# Patient Record
Sex: FEMALE | Race: WHITE | NOT HISPANIC OR LATINO | Employment: OTHER | URBAN - METROPOLITAN AREA
[De-identification: names, ages, dates, MRNs, and addresses within clinical notes are randomized per-mention and may not be internally consistent; named-entity substitution may affect disease eponyms.]

---

## 2023-06-20 PROBLEM — J44.1 COPD WITH ACUTE EXACERBATION: Status: ACTIVE | Noted: 2023-06-20

## 2023-06-20 PROBLEM — J96.01 ACUTE RESPIRATORY FAILURE WITH HYPOXIA: Status: ACTIVE | Noted: 2023-06-20

## 2023-06-20 PROBLEM — J44.1 COPD EXACERBATION: Status: ACTIVE | Noted: 2023-06-20

## 2023-06-20 PROBLEM — Z72.0 TOBACCO USE: Status: ACTIVE | Noted: 2023-06-20

## 2023-06-21 PROBLEM — E43 SEVERE MALNUTRITION: Status: ACTIVE | Noted: 2023-06-21

## 2023-06-23 PROBLEM — J96.01 ACUTE RESPIRATORY FAILURE WITH HYPOXIA: Status: RESOLVED | Noted: 2023-06-20 | Resolved: 2023-06-23

## 2023-07-31 ENCOUNTER — HOSPITAL ENCOUNTER (EMERGENCY)
Facility: HOSPITAL | Age: 73
Discharge: HOME | End: 2023-07-31
Attending: EMERGENCY MEDICINE | Admitting: EMERGENCY MEDICINE
Payer: MEDICARE

## 2023-07-31 VITALS
RESPIRATION RATE: 18 BRPM | SYSTOLIC BLOOD PRESSURE: 126 MMHG | BODY MASS INDEX: 21.34 KG/M2 | HEART RATE: 84 BPM | TEMPERATURE: 98 F | DIASTOLIC BLOOD PRESSURE: 110 MMHG | HEIGHT: 64 IN | WEIGHT: 125 LBS | OXYGEN SATURATION: 97 %

## 2023-07-31 DIAGNOSIS — Z87.09 HISTORY OF COPD: ICD-10-CM

## 2023-07-31 DIAGNOSIS — Z99.81 DEPENDENCE ON SUPPLEMENTAL OXYGEN: Primary | ICD-10-CM

## 2023-07-31 PROCEDURE — 99283 EMERGENCY DEPT VISIT LOW MDM: CPT

## 2023-08-10 ENCOUNTER — OFFICE VISIT (OUTPATIENT)
Dept: PULMONOLOGY | Facility: CLINIC | Age: 73
End: 2023-08-10
Payer: MEDICARE

## 2023-08-10 VITALS
DIASTOLIC BLOOD PRESSURE: 52 MMHG | WEIGHT: 107 LBS | HEIGHT: 66 IN | TEMPERATURE: 98.2 F | SYSTOLIC BLOOD PRESSURE: 104 MMHG | BODY MASS INDEX: 17.19 KG/M2 | OXYGEN SATURATION: 97 % | HEART RATE: 68 BPM

## 2023-08-10 DIAGNOSIS — J44.9 COPD MIXED TYPE: ICD-10-CM

## 2023-08-10 DIAGNOSIS — J96.11 CHRONIC RESPIRATORY FAILURE WITH HYPOXIA: ICD-10-CM

## 2023-08-10 DIAGNOSIS — E43 SEVERE MALNUTRITION: ICD-10-CM

## 2023-08-10 DIAGNOSIS — Z72.0 TOBACCO USE: ICD-10-CM

## 2023-08-10 DIAGNOSIS — R91.1 PULMONARY NODULE: Primary | ICD-10-CM

## 2023-08-10 RX ORDER — FLUTICASONE FUROATE, UMECLIDINIUM BROMIDE AND VILANTEROL TRIFENATATE 100; 62.5; 25 UG/1; UG/1; UG/1
POWDER RESPIRATORY (INHALATION)
COMMUNITY
Start: 2023-07-29 | End: 2023-08-10

## 2023-08-10 RX ORDER — BUDESONIDE AND FORMOTEROL FUMARATE DIHYDRATE 160; 4.5 UG/1; UG/1
2 AEROSOL RESPIRATORY (INHALATION)
Qty: 1 EACH | Refills: 11 | Status: SHIPPED | OUTPATIENT
Start: 2023-08-10

## 2023-08-10 RX ORDER — ALBUTEROL SULFATE 2.5 MG/3ML
2.5 SOLUTION RESPIRATORY (INHALATION) EVERY 6 HOURS PRN
Qty: 120 EACH | Refills: 11 | Status: SHIPPED | OUTPATIENT
Start: 2023-08-10

## 2023-08-11 ENCOUNTER — TELEPHONE (OUTPATIENT)
Dept: PULMONOLOGY | Facility: CLINIC | Age: 73
End: 2023-08-11
Payer: MEDICARE

## 2023-08-11 NOTE — PROGRESS NOTES
PULMONARY  NOTE    Chief Complaint     Pulmonary nodule, COPD, respiratory failure, tobacco abuse    History of Present Illness     72-year-old female referred for an abnormal CT scan of the chest    She had an exacerbation of COPD resulting in a hospitalization at [facility] in June  A CT scan of the chest done at the time of her admission revealed a pulmonary nodule    The patient has a history of tobacco abuse but indicates that she has not smoked since her hospitalization in June    She has chronic hypoxic respiratory failure  She has supplemental oxygen which she uses 24 hours a day    She has been on Symbicort, Spiriva, and albuterol as needed since her discharge    To her knowledge she has not had a CT scan of the chest in the past    She has dyspnea on exertion would have difficulty going up 1 flight of stairs  She has chronic daily cough but no hemoptysis    Patient Active Problem List   Diagnosis    Tobacco use    Severe malnutrition    COPD (? Severity)    Pulmonary nodule (7mm LLL incidental)    Chronic respiratory failure with hypoxia     No Known Allergies    Current Outpatient Medications:     budesonide-formoterol (SYMBICORT) 160-4.5 MCG/ACT inhaler, Inhale 2 puffs 2 (Two) Times a Day., Disp: 10.2 g, Rfl: 2    ipratropium-albuterol (DUO-NEB) 0.5-2.5 mg/3 ml nebulizer, Take 3 mL by nebulization Every 6 (Six) Hours As Needed for Wheezing or Shortness of Air., Disp: 360 mL, Rfl: 0    nicotine (NICODERM CQ) 14 MG/24HR patch, Place 1 patch on the skin as directed by provider Daily., Disp: 30 patch, Rfl: 0    predniSONE (DELTASONE) 10 MG tablet, 40mg x 3 days, 30mg x 3 days, 20mg x 3 days, 10mg x 3 days then stop, Disp: 30 tablet, Rfl: 0    tiotropium bromide monohydrate (SPIRIVA RESPIMAT) 2.5 MCG/ACT aerosol solution inhaler, Inhale 2 puffs Daily., Disp: 4 g, Rfl: 0    Trelegy Ellipta 100-62.5-25 MCG/ACT inhaler, INHALE 1 PUFF BY MOUTH ONCE DAILY FOR 30 DAYS, Disp: , Rfl:   MEDICATION LIST AND ALLERGIES  "REVIEWED.    Family History   Problem Relation Age of Onset    Stroke Mother     Stroke Father      Social History     Tobacco Use    Smoking status: Every Day     Packs/day: 0.50     Types: Cigarettes     Passive exposure: Current    Smokeless tobacco: Never   Vaping Use    Vaping Use: Never used   Substance Use Topics    Alcohol use: Never    Drug use: Never     Social History     Social History Narrative    Hasn't smoked since her hospitalization 6/2023     FAMILY AND SOCIAL HISTORY REVIEWED.    Review of Systems  IF PRESENT REFER TO SCANNED ROS SHEET FROM SAME DATE  OTHERWISE ROS OBTAINED AND NON-CONTRIBUTORY OVER HPI.    /52 (BP Location: Right arm, Patient Position: Sitting, Cuff Size: Adult)   Pulse 68   Temp 98.2 øF (36.8 øC)   Ht 167.6 cm (66\")   Wt 48.5 kg (107 lb)   SpO2 97%   PF (!) 2 L/min Comment: Continuous  BMI 17.27 kg/mý   Physical Exam  Vitals and nursing note reviewed.   Constitutional:       General: She is not in acute distress.     Appearance: She is well-developed. She is not diaphoretic.   HENT:      Head: Normocephalic and atraumatic.   Neck:      Thyroid: No thyromegaly.   Cardiovascular:      Rate and Rhythm: Normal rate and regular rhythm.      Heart sounds: Normal heart sounds. No murmur heard.  Pulmonary:      Effort: Pulmonary effort is normal.      Breath sounds: No stridor.      Comments: Decreased breath sounds bilaterally without wheezes  Lymphadenopathy:      Cervical: No cervical adenopathy.      Upper Body:      Right upper body: No supraclavicular or epitrochlear adenopathy.      Left upper body: No supraclavicular or epitrochlear adenopathy.   Skin:     General: Skin is warm and dry.   Neurological:      Mental Status: She is alert and oriented to person, place, and time.   Psychiatric:         Behavior: Behavior normal.       Results     PFTs are difficult to interpret.  Very severe airway obstruction cannot be excluded.  No restriction and unable to determine " DLCO    CT scan of the chest from 6/2023 reviewed on PACS  Incidental noncalcified 7 mm left lower lobe pulmonary nodule    Immunization History   Administered Date(s) Administered    Pneumococcal Conjugate 13-Valent (PCV13) 07/31/2019     Problem List       ICD-10-CM ICD-9-CM   1. Pulmonary nodule (7mm LLL incidental)  R91.1 793.11   2. COPD (? Severity)  J44.9 496   3. Chronic respiratory failure with hypoxia  J96.11 518.83     799.02   4. Severe malnutrition  E43 261   5. Tobacco use  Z72.0 305.1       Discussion     We discussed her chest imaging  She has an incidentally identified 7 mm left lower lobe pulmonary nodule  She is high risk given her history of tobacco abuse  I gave her literature on pulmonary nodules and management guidelines  Based on Fleischner Society guidelines I recommend a 6-month follow-up CT scan of the chest which we will arrange before the end of the year    I encouraged her smoking cessation efforts    She is can remain on Symbicort, Spiriva, and albuterol as needed    I have encouraged continued use of supplemental oxygen    I will plan to see her back after her follow-up CT scan of the chest    Moderate level of Medical Decision Making complexity based on 1 undiagnosed new problem or 2 stable chronic conditions, independent interpretation of tests, and/or prescription drug management     Nik Carr MD  Note electronically signed    CC: Provider, No Known

## 2023-08-14 DIAGNOSIS — R91.1 PULMONARY NODULE: Primary | ICD-10-CM

## 2023-08-14 DIAGNOSIS — Z72.0 TOBACCO USE: ICD-10-CM

## 2023-08-15 NOTE — TELEPHONE ENCOUNTER
Spiriva respimat 2.5 mcg is not covered by insurance, other covered medication are atrovent hfa anoro ellipta breo ellipta 100-200 mcg or incruse ellipta 62.5mcg

## 2023-08-16 RX ORDER — UMECLIDINIUM BROMIDE AND VILANTEROL TRIFENATATE 62.5; 25 UG/1; UG/1
1 POWDER RESPIRATORY (INHALATION)
Qty: 14 EACH | Refills: 4 | Status: SHIPPED | OUTPATIENT
Start: 2023-08-16

## 2023-09-12 ENCOUNTER — OFFICE VISIT (OUTPATIENT)
Dept: FAMILY MEDICINE CLINIC | Facility: CLINIC | Age: 73
End: 2023-09-12
Payer: MEDICARE

## 2023-09-12 VITALS
WEIGHT: 108 LBS | HEART RATE: 83 BPM | TEMPERATURE: 98.2 F | OXYGEN SATURATION: 80 % | RESPIRATION RATE: 18 BRPM | DIASTOLIC BLOOD PRESSURE: 58 MMHG | SYSTOLIC BLOOD PRESSURE: 110 MMHG | HEIGHT: 66 IN | BODY MASS INDEX: 17.36 KG/M2

## 2023-09-12 DIAGNOSIS — Z99.81 SUPPLEMENTAL OXYGEN DEPENDENT: ICD-10-CM

## 2023-09-12 DIAGNOSIS — R91.1 PULMONARY NODULE: Primary | ICD-10-CM

## 2023-09-12 DIAGNOSIS — J44.9 COPD MIXED TYPE: ICD-10-CM

## 2023-09-12 DIAGNOSIS — J96.11 CHRONIC RESPIRATORY FAILURE WITH HYPOXIA: ICD-10-CM

## 2023-09-12 DIAGNOSIS — Z72.0 TOBACCO USE: ICD-10-CM

## 2023-09-12 DIAGNOSIS — Z23 NEED FOR VACCINATION FOR STREP PNEUMONIAE: ICD-10-CM

## 2023-09-12 NOTE — LETTER
[facility]  Vaccine Consent Form    Patient Name:  Elisabeth Burger  Patient :  1950     Vaccine(s) Ordered    Pneumococcal Conjugate Vaccine 20-Valent (PCV20)        Screening Checklist  The following questions should be completed prior to vaccination. If you answer “yes” to any question, it does not necessarily mean you should not be vaccinated. It just means we may need to clarify or ask more questions. If a question is unclear, please ask your healthcare provider to explain it.    Yes No   Any fever or moderate to severe illness today (mild illness and/or antibiotic treatment are not contraindications)?     Do you have a history of a serious reaction to any previous vaccinations, such as anaphylaxis, encephalopathy within 7 days, Guillain-Greenwald syndrome within 6 weeks, seizure?     Have you received any live vaccine(s) in the past month (MMR, MINERVA)?     Do you have an anaphylactic allergy to latex (DTaP, DTaP-IPV, Hep A, Hep B, MenB, RV, Td, Tdap), baker’s yeast (Hep B, HPV), or gelatin (MINERVA, MMR)?     Do you have an anaphylactic allergy to neomycin (Rabies, MINERVA, MMR, IPV, Hep A), polymyxin B (IPV), or streptomycin (IPV)?      Any cancer, leukemia, AIDS, or other immune system disorder? (MINERVA, MMR, RV)     Do you have a parent, brother, or sister with an immune system problem (if immune competence of vaccine recipient clinically verified, can proceed)? (MMR, MINERVA)     Any recent steroid treatments for >2 weeks, chemotherapy, or radiation treatment? (MINERVA, MMR)     Have you received antibody-containing blood transfusions or IVIG in the past 11 months (recommended interval is dependent on product)? (MMR, MINERVA)     Have you taken antiviral drugs (acyclovir, famciclovir, valacyclovir) in the last 24 or 48 hours, respectively (MINERVA)?      Are you pregnant or planning to become pregnant within 1 month? (MINERVA, MMR, HPV, IPV, MenB; For hep B- refer to Engerix-B)     For infants, have you ever been told your child  has had intussusception or a medical emergency involving obstruction of the intestine (RV)? If not for an infant, can skip this question.         *Ordering Physician/APC should be consulted if “yes” is checked by the patient or guardian above.      I have received, read, and understand the Vaccine Information Statement (VIS) for each vaccine ordered above.  I have considered my health status as well as the health status of my close contacts.  I have taken the opportunity to discuss my vaccine questions with my health care provider.   I have requested that the ordered vaccine(s) be given to me.  I understand the benefits and risks of the vaccines.  I understand that I should remain in the clinic for 15 minutes after receiving the vaccine(s).  _________________________________________________________  Signature of Patient or Parent/Legal Guardian ____________________  Date

## 2023-09-12 NOTE — PROGRESS NOTES
"Chief Complaint   Patient presents with    [facility]     Had pneumonia 1 month ago went to [facility], Need for PCP  Today 80 O2 without oxygen   Needs to get back on patches for smoking.       Subjective      Elisabeth Burger is a 73 y.o. who presents to [facility].  Lives with daughter and grandson.  Previous PCP was Dr. Rodriguez who retired.      Pulmonology - treating with Dr. Carr at [facility]. Taking symbicort and spiriva inhalers, but admits not as compliant as she should be.  She is oxygen dependent, and does not wear her oxygen at home as much as she should.  States she is using albuterol nebulizer daily.  Follows up with pulmonology in 6 months, but this has not been scheduled.  Patient's niece was with her at today's visit, and called the office and scheduled for December 2023.  Recent hospitalization a little over a month ago for pneumonia.  At [facility].  No notes (requested).  She has cut back on smoking now 1/2 pack per day from 2 packs per day.  She is using nicotine patches as needed.  Sees GYN tomorrow for pessary change  No other significant medical history reported.      The following portions of the patient's history were reviewed and updated as appropriateallergies, current medications, past family history, past medical history, past social history, past surgical history, and problem list.    Review of Systems   Constitutional:  Negative for chills and fever.   Respiratory:  Positive for shortness of breath and wheezing. Negative for cough and chest tightness.    Cardiovascular:  Negative for chest pain, palpitations and leg swelling.   Neurological:  Negative for dizziness.   Psychiatric/Behavioral: Negative.       Objective   Vital Signs:  /58   Pulse 83   Temp 98.2 °F (36.8 °C)   Resp 18   Ht 167.6 cm (66\")   Wt 49 kg (108 lb)   SpO2 (!) 80%   BMI 17.43 kg/m²             Physical Exam  Vitals and nursing note reviewed.   Constitutional:       General: She is " not in acute distress.     Appearance: Normal appearance.   Cardiovascular:      Rate and Rhythm: Normal rate and regular rhythm.      Heart sounds: Normal heart sounds.   Pulmonary:      Effort: Pulmonary effort is normal.      Breath sounds: Decreased breath sounds (throughout) present. No wheezing, rhonchi or rales.   Neurological:      General: No focal deficit present.      Mental Status: She is alert and oriented to person, place, and time.   Psychiatric:         Mood and Affect: Mood normal.         Behavior: Behavior normal.        Result Review                     Assessment and Plan  Diagnoses and all orders for this visit:    1. Pulmonary nodule (7mm LLL incidental) (Primary)    2. COPD (? Severity)  -     Cancel: Fluzone >6 Months (5962-1952)    3. Chronic respiratory failure with hypoxia    4. Supplemental oxygen dependent    5. Tobacco use    6. Need for vaccination for Strep pneumoniae  -     Cancel: Fluzone >6 Months (8640-7000)  -     Pneumococcal Conjugate Vaccine 20-Valent (PCV20)      Pneumovax today.  Follow up in 4 weeks for annual wellness visit. Plan for flu vaccine at that point (high dose not available today in clinic).   Patient was instructed to wear O2 at home as previously directed by pulmonology. Patient to continue spiriva and symbicort inhalers as prescribed by pulmonology.  Patient can use albuterol inhaler or neb as needed. Instructed patient, daughter and niece related to medications  Patient was counseled on smoking cessation. She has cut back from 4 ppd at one point down to 1/2 ppd now.  She is continuing to try to stop smoking.  We will recheck in 1 month and discuss further   Recheck 02 was 94% with O2 NC.   Patient aware to follow up sooner for any increased sputum production, cough or increased shortness of breath.  Follow up with pulmonology as planned.         Discussed medications prescribed and OTC medications recommended.  Discussed medication safety, possible side  effects and how to take or administer medications. Instructed patient to report any adverse reactions, side effects or concerns.     Reviewed physical exam findings and plan with patient who verbalized understanding and agrees with plan of care. Patient was given opportunity to ask questions and all concerns were addressed prior to the conclusion of today's visit.     Follow Up  Return in about 4 weeks (around 10/10/2023) for Medicare Wellness, Annual and recheck COPD .  Patient was given instructions and counseling regarding her condition or for health maintenance advice. Please see specific information pulled into the AVS if appropriate.     Note to patient: The 21st Century Cures Act makes medical notes like these available to patients in the interest of transparency. However, be advised this is a medical document. It is intended as peer to peer communication. It is written in medical language and may contain abbreviations or verbiage that are unfamiliar. It may appear blunt or direct. Medical documents are intended to carry relevant information, facts as evident, and the clinical opinion of the provider.

## 2023-10-24 ENCOUNTER — FLU SHOT (OUTPATIENT)
Dept: FAMILY MEDICINE CLINIC | Facility: CLINIC | Age: 73
End: 2023-10-24
Payer: MEDICARE

## 2023-10-24 DIAGNOSIS — Z23 NEED FOR INFLUENZA VACCINATION: Primary | ICD-10-CM

## 2023-10-24 PROCEDURE — 90662 IIV NO PRSV INCREASED AG IM: CPT

## 2023-10-24 PROCEDURE — G0008 ADMIN INFLUENZA VIRUS VAC: HCPCS

## 2023-11-03 ENCOUNTER — OFFICE VISIT (OUTPATIENT)
Dept: FAMILY MEDICINE CLINIC | Facility: CLINIC | Age: 73
End: 2023-11-03
Payer: MEDICARE

## 2023-11-03 VITALS
HEIGHT: 66 IN | SYSTOLIC BLOOD PRESSURE: 122 MMHG | TEMPERATURE: 99.1 F | DIASTOLIC BLOOD PRESSURE: 60 MMHG | HEART RATE: 83 BPM | BODY MASS INDEX: 16.71 KG/M2 | WEIGHT: 104 LBS | RESPIRATION RATE: 18 BRPM | OXYGEN SATURATION: 84 %

## 2023-11-03 DIAGNOSIS — Z72.0 TOBACCO USE: ICD-10-CM

## 2023-11-03 DIAGNOSIS — Z13.220 LIPID SCREENING: ICD-10-CM

## 2023-11-03 DIAGNOSIS — Z99.81 SUPPLEMENTAL OXYGEN DEPENDENT: ICD-10-CM

## 2023-11-03 DIAGNOSIS — Z23 NEED FOR VACCINATION: ICD-10-CM

## 2023-11-03 DIAGNOSIS — Z00.00 ENCOUNTER FOR ANNUAL WELLNESS VISIT (AWV) IN MEDICARE PATIENT: Primary | ICD-10-CM

## 2023-11-03 DIAGNOSIS — J44.9 COPD MIXED TYPE: ICD-10-CM

## 2023-11-03 DIAGNOSIS — E43 SEVERE MALNUTRITION: ICD-10-CM

## 2023-11-03 NOTE — PROGRESS NOTES
The ABCs of the Annual Wellness Visit  Subsequent Medicare Wellness Visit    Subjective    Elisabeth Burger is a 73 y.o. female who presents for a Subsequent Medicare Wellness Visit.      The following portions of the patient's history were reviewed and   updated as appropriate: allergies, current medications, past family history, past medical history, past social history, past surgical history, and problem list.    Compared to one year ago, the patient feels her physical   health is better.    Compared to one year ago, the patient feels her mental   health is better.    Recent Hospitalizations:  This patient has had a [facility] admission record on file within the last 365 days.    Current Medical Providers:  Patient Care Team:  Corina Portillo APRN as PCP - General (Family Medicine)  Nikki Niño RN as Nurse Navigator  Nik Carr MD as Emergency Attending (Pulmonary Disease)    Outpatient Medications Prior to Visit   Medication Sig Dispense Refill    albuterol (PROVENTIL) (2.5 MG/3ML) 0.083% nebulizer solution Take 2.5 mg by nebulization Every 6 (Six) Hours As Needed for Wheezing or Shortness of Air. 120 each 11    budesonide-formoterol (SYMBICORT) 160-4.5 MCG/ACT inhaler Inhale 2 puffs 2 (Two) Times a Day. 1 each 11    nicotine (NICODERM CQ) 14 MG/24HR patch Place 1 patch on the skin as directed by provider Daily. 30 patch 0    tiotropium bromide monohydrate (SPIRIVA RESPIMAT) 2.5 MCG/ACT aerosol solution inhaler Inhale 2 puffs Daily. 1 each 11    Umeclidinium-Vilanterol (Anoro Ellipta) 62.5-25 MCG/ACT aerosol powder  inhaler Inhale 1 puff Daily. 14 each 4     No facility-administered medications prior to visit.       No opioid medication identified on active medication list. I have reviewed chart for other potential  high risk medication/s and harmful drug interactions in the elderly.        Aspirin is not on active medication list.  Aspirin use is not indicated based on review of current  "medical condition/s. Risk of harm outweighs potential benefits.  .    Patient Active Problem List   Diagnosis    Tobacco use    Severe malnutrition    COPD (? Severity)    Pulmonary nodule (7mm LLL incidental)    Chronic respiratory failure with hypoxia    Supplemental oxygen dependent     Advance Care Planning   Advance Care Planning     Advance Directive is not on file.  ACP discussion was held with the patient during this visit. Patient has an advance directive (not in EMR), copy requested.     Objective    Vitals:    23 1142   BP: 122/60   Pulse: 83   Resp: 18   Temp: 99.1 °F (37.3 °C)   SpO2: (!) 84%   Weight: 47.2 kg (104 lb)   Height: 167.6 cm (66\")     Estimated body mass index is 16.79 kg/m² as calculated from the following:    Height as of this encounter: 167.6 cm (66\").    Weight as of this encounter: 47.2 kg (104 lb).    Does the patient have evidence of cognitive impairment? No          HEALTH RISK ASSESSMENT    Smoking Status:  Social History     Tobacco Use   Smoking Status Former    Packs/day: 0.50    Years: 50.00    Additional pack years: 0.00    Total pack years: 25.00    Types: Cigarettes    Quit date:     Years since quittin.8    Passive exposure: Current   Smokeless Tobacco Never     Alcohol Consumption:  Social History     Substance and Sexual Activity   Alcohol Use Never     Fall Risk Screen:    LEIGHANN Fall Risk Assessment was completed, and patient is at LOW risk for falls.Assessment completed on:11/3/2023    Depression Screenin/3/2023    11:50 AM   PHQ-2/PHQ-9 Depression Screening   Feeling Down, Depressed or Hopeless 0-->not at all   PHQ-9: Brief Depression Severity Measure Score 0       Health Habits and Functional and Cognitive Screenin/3/2023    11:48 AM   Functional & Cognitive Status   Do you have difficulty preparing food and eating? No   Do you have difficulty bathing yourself, getting dressed or grooming yourself? No   Do you have difficulty using " the toilet? No   Do you have difficulty moving around from place to place? No   Do you have trouble with steps or getting out of a bed or a chair? No   Current Diet Well Balanced Diet   Dental Exam Up to date   Eye Exam Not up to date   Exercise (times per week) 0 times per week   Current Exercises Include Walking   Do you need help using the phone?  No   Are you deaf or do you have serious difficulty hearing?  No   Do you need help to go to places out of walking distance? No   Do you need help shopping? No   Do you need help preparing meals?  No   Do you need help with housework?  No   Do you need help with laundry? No   Do you need help taking your medications? No   Do you need help managing money? No   Do you ever drive or ride in a car without wearing a seat belt? No   Have you felt unusual stress, anger or loneliness in the last month? No   Who do you live with? Child   If you need help, do you have trouble finding someone available to you? No   Have you been bothered in the last four weeks by sexual problems? No   Do you have difficulty concentrating, remembering or making decisions? No       Age-appropriate Screening Schedule:  Refer to the list below for future screening recommendations based on patient's age, sex and/or medical conditions. Orders for these recommended tests are listed in the plan section. The patient has been provided with a written plan.    Health Maintenance   Topic Date Due    DXA SCAN  Never done    COVID-19 Vaccine (1) Never done    ZOSTER VACCINE (1 of 2) Never done    HEPATITIS C SCREENING  Never done    ANNUAL WELLNESS VISIT  Never done    LUNG CANCER SCREENING  06/20/2024    COLORECTAL CANCER SCREENING  06/20/2024    BMI FOLLOWUP  11/03/2024    TDAP/TD VACCINES (2 - Td or Tdap) 11/03/2033    INFLUENZA VACCINE  Completed    Pneumococcal Vaccine 65+  Completed                  CMS Preventative Services Quick Reference  Risk Factors Identified During Encounter  Immunizations  "Discussed/Encouraged: Tdap and Shingrix  Vision Screening Recommended  The above risks/problems have been discussed with the patient.  Pertinent information has been shared with the patient in the After Visit Summary.  An After Visit Summary and PPPS were made available to the patient.    Follow Up:   Next Medicare Wellness visit to be scheduled in 1 year.       Additional E&M Note during same encounter follows:  Patient has multiple medical problems which are significant and separately identifiable that require additional work above and beyond the Medicare Wellness Visit.      Chief Complaint  Medicare Wellness-subsequent    HPI  Elisabeth Burger is also being seen today for COPD follow up.  She is O2 Dependent but did not bring her supplemental oxygen today.  She does not feel short of breath.  She is not in respiratory distress.   Treating with pulmonology - sees them again in a month.    She has cut back on smoking and has not had a cigarette today, but still smokes 2-3 per day.      Review of Systems   Constitutional:  Negative for chills and fever.   HENT:  Positive for rhinorrhea. Negative for congestion and sore throat.    Eyes: Negative.    Respiratory:  Positive for cough. Negative for chest tightness and shortness of breath (does not feel short of breath).    Cardiovascular:  Negative for chest pain and leg swelling.   Gastrointestinal:  Negative for abdominal pain, constipation and diarrhea.   Endocrine: Negative for cold intolerance and heat intolerance.   Genitourinary:  Negative for dysuria and hematuria.   Neurological:  Negative for dizziness, syncope and numbness.   Psychiatric/Behavioral:  Negative for dysphoric mood. The patient is not nervous/anxious.        Objective   Vital Signs:  /60   Pulse 83   Temp 99.1 °F (37.3 °C)   Resp 18   Ht 167.6 cm (66\")   Wt 47.2 kg (104 lb)   SpO2 (!) 84%   BMI 16.79 kg/m²     Physical Exam  Vitals and nursing note reviewed.   HENT:      Head: " Normocephalic.      Right Ear: Tympanic membrane, ear canal and external ear normal.      Left Ear: Tympanic membrane, ear canal and external ear normal.      Nose: Nose normal.      Mouth/Throat:      Pharynx: Oropharynx is clear.   Eyes:      Extraocular Movements: Extraocular movements intact.      Pupils: Pupils are equal, round, and reactive to light.   Cardiovascular:      Rate and Rhythm: Normal rate and regular rhythm.      Heart sounds: Normal heart sounds.   Pulmonary:      Effort: Pulmonary effort is normal.      Breath sounds: Decreased breath sounds present.   Abdominal:      General: Bowel sounds are normal.      Palpations: Abdomen is soft.      Tenderness: There is no abdominal tenderness.   Musculoskeletal:         General: Normal range of motion.      Cervical back: Normal range of motion.   Skin:     General: Skin is warm and dry.      Capillary Refill: Capillary refill takes less than 2 seconds.   Neurological:      General: No focal deficit present.      Mental Status: She is alert and oriented to person, place, and time.   Psychiatric:         Mood and Affect: Mood normal.         Behavior: Behavior normal.                         Assessment and Plan   Diagnoses and all orders for this visit:    1. Encounter for annual wellness visit (AWV) in Medicare patient (Primary)  -     Comprehensive Metabolic Panel; Future  -     CBC (No Diff); Future    2. Supplemental oxygen dependent    3. COPD mixed type  -     Comprehensive Metabolic Panel; Future  -     CBC (No Diff); Future    4. Lipid screening  -     Lipid Panel; Future    5. Need for vaccination  -     Tdap Vaccine Greater Than or Equal To 6yo IM    6. Severe malnutrition  -     Ambulatory Referral to Nutrition Services    7. Tobacco use      Surveillance labs ordered.   Malnutrition - referral to nutrition services. Recommended small frequent meals and possible supplementation with protein shakes. She states she does not like protein shakes.    Discussed wellness with patient including preventative care (vaccines and recommended screenings). Discussed recommendations for diet and exercise.    Spent 3-4 minutes discussing the importance of always wearing supplemental oxygen.  We also discussed smoking cessation. Elisabeth Burger  reports that she quit smoking about 10 months ago. Her smoking use included cigarettes. She has a 25.00 pack-year smoking history. She has been exposed to tobacco smoke. She has never used smokeless tobacco.. I have educated her on the risk of diseases from using tobacco products such as cancer, COPD, and heart disease.     I advised her to quit and she is willing to quit. We have discussed the following method/s for tobacco cessation:  Counseling.  Together we have set a quit date for  undetermined .  She will follow up with me in 3 months or sooner to check on her progress.  Congratulated patient on her progress with cutting back on smoking.   I spent 4 minutes counseling the patient.              Follow Up   Return in about 3 months (around 2/3/2024) for Recheck COPD .  Patient was given instructions and counseling regarding her condition or for health maintenance advice. Please see specific information pulled into the AVS if appropriate.

## 2023-12-13 ENCOUNTER — OFFICE VISIT (OUTPATIENT)
Dept: PULMONOLOGY | Facility: CLINIC | Age: 73
End: 2023-12-13
Payer: MEDICARE

## 2023-12-13 VITALS
SYSTOLIC BLOOD PRESSURE: 126 MMHG | OXYGEN SATURATION: 91 % | BODY MASS INDEX: 17.13 KG/M2 | HEIGHT: 66 IN | WEIGHT: 106.6 LBS | HEART RATE: 64 BPM | RESPIRATION RATE: 18 BRPM | DIASTOLIC BLOOD PRESSURE: 70 MMHG

## 2023-12-13 DIAGNOSIS — E43 SEVERE MALNUTRITION: ICD-10-CM

## 2023-12-13 DIAGNOSIS — J96.11 CHRONIC RESPIRATORY FAILURE WITH HYPOXIA: Primary | ICD-10-CM

## 2023-12-13 DIAGNOSIS — R91.1 PULMONARY NODULE: ICD-10-CM

## 2023-12-13 DIAGNOSIS — J44.9 COPD MIXED TYPE: ICD-10-CM

## 2023-12-13 DIAGNOSIS — Z99.81 SUPPLEMENTAL OXYGEN DEPENDENT: ICD-10-CM

## 2023-12-13 RX ORDER — BUDESONIDE AND FORMOTEROL FUMARATE DIHYDRATE 160; 4.5 UG/1; UG/1
2 AEROSOL RESPIRATORY (INHALATION)
Qty: 1 EACH | Refills: 11 | Status: SHIPPED | OUTPATIENT
Start: 2023-12-13

## 2023-12-13 RX ORDER — ALBUTEROL SULFATE 2.5 MG/3ML
2.5 SOLUTION RESPIRATORY (INHALATION) EVERY 6 HOURS PRN
Qty: 120 EACH | Refills: 11 | Status: SHIPPED | OUTPATIENT
Start: 2023-12-13

## 2023-12-13 NOTE — PROGRESS NOTES
PULMONARY  NOTE    Chief Complaint     Pulmonary nodule, COPD, chronic respiratory failure, tobacco abuse    History of Present Illness     73-year-old female returns today for follow-up  I initially saw her 8/10/2023    She has a history of tobacco abuse  She stopped smoking when she was hospitalized in June but sounds as though she is gone back to smoking periodically    She has chronic hypoxic respiratory failure due to advanced age COPD  She has supplemental oxygen which she apparently does not consistently use on a regular basis    She has been on Symbicort, Spiriva, and albuterol for her COPD  She has had no recent acute exacerbation  Apparently insurance is covering the cost of the medications    She had a 7 mm left lower lobe incidentally identified pulmonary nodule on prior chest imaging  Her plan was for 6-month CT scan of the chest which apparently she did not get scheduled    Patient Active Problem List   Diagnosis    Tobacco use    Severe malnutrition    COPD (? Severity)    Pulmonary nodule (7mm LLL incidental)    Chronic respiratory failure with hypoxia    Supplemental oxygen dependent      No Known Allergies    Current Outpatient Medications:     albuterol (PROVENTIL) (2.5 MG/3ML) 0.083% nebulizer solution, Take 2.5 mg by nebulization Every 6 (Six) Hours As Needed for Wheezing or Shortness of Air., Disp: 120 each, Rfl: 11    budesonide-formoterol (SYMBICORT) 160-4.5 MCG/ACT inhaler, Inhale 2 puffs 2 (Two) Times a Day., Disp: 1 each, Rfl: 11    nicotine (NICODERM CQ) 14 MG/24HR patch, Place 1 patch on the skin as directed by provider Daily., Disp: 30 patch, Rfl: 0    tiotropium bromide monohydrate (SPIRIVA RESPIMAT) 2.5 MCG/ACT aerosol solution inhaler, Inhale 2 puffs Daily., Disp: 1 each, Rfl: 11    Umeclidinium-Vilanterol (Anoro Ellipta) 62.5-25 MCG/ACT aerosol powder  inhaler, Inhale 1 puff Daily., Disp: 14 each, Rfl: 4  MEDICATION LIST AND ALLERGIES REVIEWED.    Family History   Problem Relation  "Age of Onset    Stroke Mother     Stroke Father      Social History     Tobacco Use    Smoking status: Former     Packs/day: 0.50     Years: 50.00     Additional pack years: 0.00     Total pack years: 25.00     Types: Cigarettes     Quit date:      Years since quittin.9     Passive exposure: Current    Smokeless tobacco: Never   Vaping Use    Vaping Use: Never used   Substance Use Topics    Alcohol use: Never    Drug use: Never     Social History     Social History Narrative    Hasn't smoked since her hospitalization 2023     FAMILY AND SOCIAL HISTORY REVIEWED.    Review of Systems  IF PRESENT REFER TO SCANNED ROS SHEET FROM SAME DATE  OTHERWISE ROS OBTAINED AND NON-CONTRIBUTORY OVER HPI.    /70 (BP Location: Left arm)   Pulse 64   Resp 18   Ht 167.6 cm (66\")   Wt 48.4 kg (106 lb 9.6 oz)   SpO2 91%   BMI 17.21 kg/m²   Physical Exam  Vitals and nursing note reviewed.   Constitutional:       General: She is not in acute distress.     Appearance: She is well-developed. She is not diaphoretic.   HENT:      Head: Normocephalic and atraumatic.   Neck:      Thyroid: No thyromegaly.   Cardiovascular:      Rate and Rhythm: Normal rate and regular rhythm.      Heart sounds: Normal heart sounds. No murmur heard.  Pulmonary:      Effort: Pulmonary effort is normal.      Breath sounds: No stridor.      Comments: Decreased breath sounds bilaterally without wheezes  Abdominal:      Palpations: Abdomen is soft.   Lymphadenopathy:      Cervical: No cervical adenopathy.      Upper Body:      Right upper body: No supraclavicular or epitrochlear adenopathy.      Left upper body: No supraclavicular or epitrochlear adenopathy.   Skin:     General: Skin is warm and dry.   Neurological:      Mental Status: She is alert and oriented to person, place, and time.   Psychiatric:         Behavior: Behavior normal.         Results     Immunization History   Administered Date(s) Administered    Fluzone High-Dose 65+yrs " 10/24/2023    Pneumococcal Conjugate 13-Valent (PCV13) 07/31/2019    Pneumococcal Conjugate 20-Valent (PCV20) 09/12/2023    Tdap 11/03/2023     Problem List       ICD-10-CM ICD-9-CM   1. Chronic respiratory failure with hypoxia  J96.11 518.83     799.02   2. COPD (? Severity)  J44.9 496   3. Pulmonary nodule (7mm LLL incidental)  R91.1 793.11   4. Severe malnutrition  E43 261   5. Supplemental oxygen dependent  Z99.81 V46.2       Discussion     We will go ahead and reschedule her CT scan of the chest    We again discussed the need for total smoking abstinence    I encourage continued use of supplemental oxygen 24 hours a day    She will remain on Symbicort, Spiriva, and albuterol as needed  I will refill these medications    Assuming the CAT scan is stable/okay I will just see her back in 6 months or earlier if there are any problems    Moderate level of Medical Decision Making complexity based on 2 or more chronic stable illnesses and an independent review of test results and/or prescription drug management.    Nik Carr MD  Note electronically signed    CC: Corina Portillo APRN

## 2024-01-31 ENCOUNTER — PATIENT OUTREACH (OUTPATIENT)
Dept: ONCOLOGY | Facility: CLINIC | Age: 74
End: 2024-01-31
Payer: MEDICARE

## 2024-02-09 ENCOUNTER — PATIENT OUTREACH (OUTPATIENT)
Dept: ONCOLOGY | Facility: CLINIC | Age: 74
End: 2024-02-09
Payer: MEDICARE

## 2024-03-15 ENCOUNTER — HOSPITAL ENCOUNTER (OUTPATIENT)
Dept: CT IMAGING | Facility: HOSPITAL | Age: 74
Discharge: HOME | End: 2024-03-15
Payer: MEDICARE

## 2024-03-15 DIAGNOSIS — Z72.0 TOBACCO USE: ICD-10-CM

## 2024-03-15 DIAGNOSIS — R91.1 PULMONARY NODULE: ICD-10-CM

## 2024-03-15 PROCEDURE — 71250 CT THORAX DX C-: CPT

## 2024-03-18 ENCOUNTER — DOCUMENTATION (OUTPATIENT)
Dept: PULMONOLOGY | Facility: CLINIC | Age: 74
End: 2024-03-18
Payer: MEDICARE

## 2024-03-18 NOTE — PROGRESS NOTES
Patient underwent CT scan of the chest which revealed nothing suspicious    I communicated these results to the patient's caretaker over the phone and have encouraged them to keep a follow-up appointment later on this spring

## 2025-07-17 ENCOUNTER — HOSPITAL ENCOUNTER (INPATIENT)
Facility: HOSPITAL | Age: 75
LOS: 5 days | Discharge: HOME | End: 2025-07-22
Attending: EMERGENCY MEDICINE | Admitting: STUDENT IN AN ORGANIZED HEALTH CARE EDUCATION/TRAINING PROGRAM
Payer: MEDICARE

## 2025-07-17 ENCOUNTER — APPOINTMENT (OUTPATIENT)
Dept: CT IMAGING | Facility: HOSPITAL | Age: 75
End: 2025-07-17
Payer: MEDICARE

## 2025-07-17 ENCOUNTER — APPOINTMENT (OUTPATIENT)
Dept: GENERAL RADIOLOGY | Facility: HOSPITAL | Age: 75
End: 2025-07-17
Payer: MEDICARE

## 2025-07-17 DIAGNOSIS — E43 SEVERE MALNUTRITION: ICD-10-CM

## 2025-07-17 DIAGNOSIS — I50.9 ACUTE ON CHRONIC CONGESTIVE HEART FAILURE, UNSPECIFIED HEART FAILURE TYPE: Primary | ICD-10-CM

## 2025-07-17 DIAGNOSIS — E87.6 ACUTE HYPOKALEMIA: ICD-10-CM

## 2025-07-17 DIAGNOSIS — Z99.81 SUPPLEMENTAL OXYGEN DEPENDENT: ICD-10-CM

## 2025-07-17 DIAGNOSIS — K62.5 RECTAL BLEEDING: ICD-10-CM

## 2025-07-17 DIAGNOSIS — R60.0 PERIPHERAL EDEMA: ICD-10-CM

## 2025-07-17 DIAGNOSIS — R91.1 LUNG NODULE: ICD-10-CM

## 2025-07-17 DIAGNOSIS — J96.11 CHRONIC RESPIRATORY FAILURE WITH HYPOXIA: ICD-10-CM

## 2025-07-17 DIAGNOSIS — Z72.0 TOBACCO USE: ICD-10-CM

## 2025-07-17 DIAGNOSIS — R91.1 PULMONARY NODULE: ICD-10-CM

## 2025-07-17 DIAGNOSIS — R13.12 OROPHARYNGEAL DYSPHAGIA: ICD-10-CM

## 2025-07-17 DIAGNOSIS — J44.9 COPD MIXED TYPE: ICD-10-CM

## 2025-07-17 DIAGNOSIS — K62.5 ANAL BLEEDING: ICD-10-CM

## 2025-07-17 DIAGNOSIS — J43.9 PULMONARY EMPHYSEMA, UNSPECIFIED EMPHYSEMA TYPE: ICD-10-CM

## 2025-07-17 DIAGNOSIS — J44.1 COPD WITH ACUTE EXACERBATION: ICD-10-CM

## 2025-07-17 DIAGNOSIS — R09.89 PULMONARY CONGESTION: ICD-10-CM

## 2025-07-17 DIAGNOSIS — R09.02 HYPOXIA: ICD-10-CM

## 2025-07-17 LAB
ALBUMIN SERPL-MCNC: 3.5 G/DL (ref 3.5–5.2)
ALBUMIN/GLOB SERPL: 1.5 G/DL
ALP SERPL-CCNC: 96 U/L (ref 39–117)
ALT SERPL W P-5'-P-CCNC: 33 U/L (ref 1–33)
ANION GAP SERPL CALCULATED.3IONS-SCNC: 7.7 MMOL/L (ref 5–15)
AST SERPL-CCNC: 40 U/L (ref 1–32)
BACTERIA UR QL AUTO: NORMAL /HPF
BASOPHILS # BLD AUTO: 0.07 10*3/MM3 (ref 0–0.2)
BASOPHILS NFR BLD AUTO: 0.7 % (ref 0–1.5)
BILIRUB SERPL-MCNC: 0.2 MG/DL (ref 0–1.2)
BILIRUB UR QL STRIP: NEGATIVE
BUN SERPL-MCNC: 6.2 MG/DL (ref 8–23)
BUN/CREAT SERPL: 9.4 (ref 7–25)
CALCIUM SPEC-SCNC: 8.6 MG/DL (ref 8.6–10.5)
CHLORIDE SERPL-SCNC: 97 MMOL/L (ref 98–107)
CLARITY UR: CLEAR
CO2 SERPL-SCNC: 35.3 MMOL/L (ref 22–29)
COLOR UR: YELLOW
CREAT SERPL-MCNC: 0.66 MG/DL (ref 0.57–1)
DEPRECATED RDW RBC AUTO: 52.2 FL (ref 37–54)
EGFRCR SERPLBLD CKD-EPI 2021: 91 ML/MIN/1.73
EOSINOPHIL # BLD AUTO: 0.31 10*3/MM3 (ref 0–0.4)
EOSINOPHIL NFR BLD AUTO: 3.1 % (ref 0.3–6.2)
ERYTHROCYTE [DISTWIDTH] IN BLOOD BY AUTOMATED COUNT: 15.2 % (ref 12.3–15.4)
FLUAV SUBTYP SPEC NAA+PROBE: NOT DETECTED
FLUBV RNA NPH QL NAA+NON-PROBE: NOT DETECTED
GEN 5 1HR TROPONIN T REFLEX: 26 NG/L
GLOBULIN UR ELPH-MCNC: 2.4 GM/DL
GLUCOSE SERPL-MCNC: 147 MG/DL (ref 65–99)
GLUCOSE UR STRIP-MCNC: NEGATIVE MG/DL
HBA1C MFR BLD: 5.5 % (ref 4.8–5.6)
HCT VFR BLD AUTO: 43.5 % (ref 34–46.6)
HGB BLD-MCNC: 13.1 G/DL (ref 12–15.9)
HGB UR QL STRIP.AUTO: ABNORMAL
HOLD SPECIMEN: NORMAL
HYALINE CASTS UR QL AUTO: NORMAL /LPF
IMM GRANULOCYTES # BLD AUTO: 0.03 10*3/MM3 (ref 0–0.05)
IMM GRANULOCYTES NFR BLD AUTO: 0.3 % (ref 0–0.5)
KETONES UR QL STRIP: NEGATIVE
LEUKOCYTE ESTERASE UR QL STRIP.AUTO: ABNORMAL
LYMPHOCYTES # BLD AUTO: 2.39 10*3/MM3 (ref 0.7–3.1)
LYMPHOCYTES NFR BLD AUTO: 24 % (ref 19.6–45.3)
MAGNESIUM SERPL-MCNC: 1.9 MG/DL (ref 1.6–2.4)
MCH RBC QN AUTO: 28.3 PG (ref 26.6–33)
MCHC RBC AUTO-ENTMCNC: 30.1 G/DL (ref 31.5–35.7)
MCV RBC AUTO: 94 FL (ref 79–97)
MONOCYTES # BLD AUTO: 0.64 10*3/MM3 (ref 0.1–0.9)
MONOCYTES NFR BLD AUTO: 6.4 % (ref 5–12)
NEUTROPHILS NFR BLD AUTO: 6.53 10*3/MM3 (ref 1.7–7)
NEUTROPHILS NFR BLD AUTO: 65.5 % (ref 42.7–76)
NITRITE UR QL STRIP: NEGATIVE
NRBC BLD AUTO-RTO: 0 /100 WBC (ref 0–0.2)
NT-PROBNP SERPL-MCNC: 2586 PG/ML (ref 0–1800)
PH UR STRIP.AUTO: 7.5 [PH] (ref 5–8)
PLATELET # BLD AUTO: 232 10*3/MM3 (ref 140–450)
PMV BLD AUTO: 9.3 FL (ref 6–12)
POTASSIUM SERPL-SCNC: 2.8 MMOL/L (ref 3.5–5.2)
PROT SERPL-MCNC: 5.9 G/DL (ref 6–8.5)
PROT UR QL STRIP: NEGATIVE
RBC # BLD AUTO: 4.63 10*6/MM3 (ref 3.77–5.28)
RBC # UR STRIP: NORMAL /HPF
REF LAB TEST METHOD: NORMAL
SARS-COV-2 RNA RESP QL NAA+PROBE: NOT DETECTED
SODIUM SERPL-SCNC: 140 MMOL/L (ref 136–145)
SP GR UR STRIP: 1.01 (ref 1–1.03)
SQUAMOUS #/AREA URNS HPF: NORMAL /HPF
TROPONIN T % DELTA: -4
TROPONIN T NUMERIC DELTA: -1 NG/L
TROPONIN T SERPL HS-MCNC: 27 NG/L
UROBILINOGEN UR QL STRIP: ABNORMAL
WBC # UR STRIP: NORMAL /HPF
WBC NRBC COR # BLD AUTO: 9.97 10*3/MM3 (ref 3.4–10.8)
WHOLE BLOOD HOLD COAG: NORMAL
WHOLE BLOOD HOLD SPECIMEN: NORMAL

## 2025-07-17 PROCEDURE — 81001 URINALYSIS AUTO W/SCOPE: CPT | Performed by: EMERGENCY MEDICINE

## 2025-07-17 PROCEDURE — 25810000003 SODIUM CHLORIDE 0.9 % SOLUTION: Performed by: EMERGENCY MEDICINE

## 2025-07-17 PROCEDURE — 83036 HEMOGLOBIN GLYCOSYLATED A1C: CPT | Performed by: PHYSICIAN ASSISTANT

## 2025-07-17 PROCEDURE — 80053 COMPREHEN METABOLIC PANEL: CPT | Performed by: EMERGENCY MEDICINE

## 2025-07-17 PROCEDURE — 36415 COLL VENOUS BLD VENIPUNCTURE: CPT

## 2025-07-17 PROCEDURE — 99223 1ST HOSP IP/OBS HIGH 75: CPT | Performed by: PHYSICIAN ASSISTANT

## 2025-07-17 PROCEDURE — 83735 ASSAY OF MAGNESIUM: CPT | Performed by: EMERGENCY MEDICINE

## 2025-07-17 PROCEDURE — 25010000002 POTASSIUM CHLORIDE 10 MEQ/100ML SOLUTION: Performed by: EMERGENCY MEDICINE

## 2025-07-17 PROCEDURE — 85025 COMPLETE CBC W/AUTO DIFF WBC: CPT | Performed by: EMERGENCY MEDICINE

## 2025-07-17 PROCEDURE — 87636 SARSCOV2 & INF A&B AMP PRB: CPT | Performed by: EMERGENCY MEDICINE

## 2025-07-17 PROCEDURE — P9612 CATHETERIZE FOR URINE SPEC: HCPCS

## 2025-07-17 PROCEDURE — 84484 ASSAY OF TROPONIN QUANT: CPT | Performed by: EMERGENCY MEDICINE

## 2025-07-17 PROCEDURE — 25010000002 FUROSEMIDE PER 20 MG: Performed by: EMERGENCY MEDICINE

## 2025-07-17 PROCEDURE — 71045 X-RAY EXAM CHEST 1 VIEW: CPT

## 2025-07-17 PROCEDURE — 74176 CT ABD & PELVIS W/O CONTRAST: CPT

## 2025-07-17 PROCEDURE — 99285 EMERGENCY DEPT VISIT HI MDM: CPT

## 2025-07-17 PROCEDURE — 83880 ASSAY OF NATRIURETIC PEPTIDE: CPT | Performed by: EMERGENCY MEDICINE

## 2025-07-17 PROCEDURE — 93005 ELECTROCARDIOGRAM TRACING: CPT | Performed by: EMERGENCY MEDICINE

## 2025-07-17 RX ORDER — SODIUM CHLORIDE 0.9 % (FLUSH) 0.9 %
10 SYRINGE (ML) INJECTION AS NEEDED
Status: DISCONTINUED | OUTPATIENT
Start: 2025-07-17 | End: 2025-07-22 | Stop reason: HOSPADM

## 2025-07-17 RX ORDER — POTASSIUM CHLORIDE 7.45 MG/ML
10 INJECTION INTRAVENOUS
Status: DISPENSED | OUTPATIENT
Start: 2025-07-17 | End: 2025-07-18

## 2025-07-17 RX ORDER — POTASSIUM CHLORIDE 1500 MG/1
40 TABLET, EXTENDED RELEASE ORAL
Status: DISCONTINUED | OUTPATIENT
Start: 2025-07-17 | End: 2025-07-18 | Stop reason: SDUPTHER

## 2025-07-17 RX ORDER — FUROSEMIDE 10 MG/ML
80 INJECTION INTRAMUSCULAR; INTRAVENOUS ONCE
Status: COMPLETED | OUTPATIENT
Start: 2025-07-17 | End: 2025-07-17

## 2025-07-17 RX ADMIN — SODIUM CHLORIDE 1000 ML: 9 INJECTION, SOLUTION INTRAVENOUS at 21:28

## 2025-07-17 RX ADMIN — FUROSEMIDE 80 MG: 10 INJECTION, SOLUTION INTRAMUSCULAR; INTRAVENOUS at 21:29

## 2025-07-17 RX ADMIN — POTASSIUM CHLORIDE 40 MEQ: 1500 TABLET, EXTENDED RELEASE ORAL at 23:01

## 2025-07-17 RX ADMIN — POTASSIUM CHLORIDE 10 MEQ: 7.46 INJECTION, SOLUTION INTRAVENOUS at 23:02

## 2025-07-18 ENCOUNTER — APPOINTMENT (OUTPATIENT)
Dept: CARDIOLOGY | Facility: HOSPITAL | Age: 75
End: 2025-07-18
Payer: MEDICARE

## 2025-07-18 LAB
ALBUMIN SERPL-MCNC: 3.2 G/DL (ref 3.5–5.2)
ALBUMIN/GLOB SERPL: 1.2 G/DL
ALP SERPL-CCNC: 89 U/L (ref 39–117)
ALT SERPL W P-5'-P-CCNC: 26 U/L (ref 1–33)
ANION GAP SERPL CALCULATED.3IONS-SCNC: 9.2 MMOL/L (ref 5–15)
AORTIC DIMENSIONLESS INDEX: 0.76 (DI)
AST SERPL-CCNC: 30 U/L (ref 1–32)
AV MEAN PRESS GRAD SYS DOP V1V2: 3 MMHG
AV VMAX SYS DOP: 119 CM/SEC
B PARAPERT DNA SPEC QL NAA+PROBE: NOT DETECTED
B PERT DNA SPEC QL NAA+PROBE: NOT DETECTED
BASOPHILS # BLD AUTO: 0.08 10*3/MM3 (ref 0–0.2)
BASOPHILS NFR BLD AUTO: 1 % (ref 0–1.5)
BH CV ECHO MEAS - AO MAX PG: 5.7 MMHG
BH CV ECHO MEAS - AO ROOT DIAM: 2.8 CM
BH CV ECHO MEAS - AO V2 VTI: 25 CM
BH CV ECHO MEAS - AVA(I,D): 2.15 CM2
BH CV ECHO MEAS - EDV(CUBED): 54.9 ML
BH CV ECHO MEAS - EDV(MOD-SP2): 65.8 ML
BH CV ECHO MEAS - EDV(MOD-SP4): 74.8 ML
BH CV ECHO MEAS - EF(MOD-SP2): 56.2 %
BH CV ECHO MEAS - EF(MOD-SP4): 74.6 %
BH CV ECHO MEAS - ESV(CUBED): 17.6 ML
BH CV ECHO MEAS - ESV(MOD-SP2): 28.8 ML
BH CV ECHO MEAS - ESV(MOD-SP4): 19 ML
BH CV ECHO MEAS - FS: 31.6 %
BH CV ECHO MEAS - IVS/LVPW: 0.7 CM
BH CV ECHO MEAS - IVSD: 0.7 CM
BH CV ECHO MEAS - LA DIMENSION: 3.2 CM
BH CV ECHO MEAS - LAT PEAK E' VEL: 16.8 CM/SEC
BH CV ECHO MEAS - LV DIASTOLIC VOL/BSA (35-75): 51.9 CM2
BH CV ECHO MEAS - LV MASS(C)D: 93.4 GRAMS
BH CV ECHO MEAS - LV MAX PG: 3.4 MMHG
BH CV ECHO MEAS - LV MEAN PG: 2 MMHG
BH CV ECHO MEAS - LV SYSTOLIC VOL/BSA (12-30): 13.2 CM2
BH CV ECHO MEAS - LV V1 MAX: 92.2 CM/SEC
BH CV ECHO MEAS - LV V1 VTI: 19 CM
BH CV ECHO MEAS - LVIDD: 3.8 CM
BH CV ECHO MEAS - LVIDS: 2.6 CM
BH CV ECHO MEAS - LVOT AREA: 2.8 CM2
BH CV ECHO MEAS - LVOT DIAM: 1.9 CM
BH CV ECHO MEAS - LVPWD: 1 CM
BH CV ECHO MEAS - MED PEAK E' VEL: 8.2 CM/SEC
BH CV ECHO MEAS - MV A MAX VEL: 107 CM/SEC
BH CV ECHO MEAS - MV DEC TIME: 0.26 SEC
BH CV ECHO MEAS - MV E MAX VEL: 94.3 CM/SEC
BH CV ECHO MEAS - MV E/A: 0.88
BH CV ECHO MEAS - MV MAX PG: 6.9 MMHG
BH CV ECHO MEAS - MV MEAN PG: 3 MMHG
BH CV ECHO MEAS - MV V2 VTI: 25.8 CM
BH CV ECHO MEAS - MVA(VTI): 2.09 CM2
BH CV ECHO MEAS - PA V2 MAX: 98.3 CM/SEC
BH CV ECHO MEAS - RV MAX PG: 1.63 MMHG
BH CV ECHO MEAS - RV V1 MAX: 63.9 CM/SEC
BH CV ECHO MEAS - RV V1 VTI: 18.2 CM
BH CV ECHO MEAS - SV(LVOT): 53.9 ML
BH CV ECHO MEAS - SV(MOD-SP2): 37 ML
BH CV ECHO MEAS - SV(MOD-SP4): 55.8 ML
BH CV ECHO MEAS - SVI(LVOT): 37.4 ML/M2
BH CV ECHO MEAS - SVI(MOD-SP2): 25.7 ML/M2
BH CV ECHO MEAS - SVI(MOD-SP4): 38.7 ML/M2
BH CV ECHO MEAS - TAPSE (>1.6): 2.08 CM
BH CV ECHO MEAS RV FREE WALL STRAIN: -16.8 %
BH CV ECHO MEASUREMENTS AVERAGE E/E' RATIO: 7.54
BH CV XLRA - RV BASE: 4.2 CM
BH CV XLRA - RV LENGTH: 6.8 CM
BH CV XLRA - RV MID: 3.7 CM
BH CV XLRA - TDI S': 10.9 CM/SEC
BILIRUB SERPL-MCNC: 0.2 MG/DL (ref 0–1.2)
BUN SERPL-MCNC: 5.8 MG/DL (ref 8–23)
BUN/CREAT SERPL: 10.4 (ref 7–25)
C PNEUM DNA NPH QL NAA+NON-PROBE: NOT DETECTED
CALCIUM SPEC-SCNC: 8 MG/DL (ref 8.6–10.5)
CHLORIDE SERPL-SCNC: 96 MMOL/L (ref 98–107)
CO2 SERPL-SCNC: 38.8 MMOL/L (ref 22–29)
CREAT SERPL-MCNC: 0.56 MG/DL (ref 0.57–1)
DEPRECATED RDW RBC AUTO: 52.1 FL (ref 37–54)
EGFRCR SERPLBLD CKD-EPI 2021: 94.7 ML/MIN/1.73
EOSINOPHIL # BLD AUTO: 0.43 10*3/MM3 (ref 0–0.4)
EOSINOPHIL NFR BLD AUTO: 5.2 % (ref 0.3–6.2)
ERYTHROCYTE [DISTWIDTH] IN BLOOD BY AUTOMATED COUNT: 15.1 % (ref 12.3–15.4)
FLUAV SUBTYP SPEC NAA+PROBE: NOT DETECTED
FLUBV RNA NPH QL NAA+NON-PROBE: NOT DETECTED
GLOBULIN UR ELPH-MCNC: 2.6 GM/DL
GLUCOSE SERPL-MCNC: 153 MG/DL (ref 65–99)
HADV DNA SPEC NAA+PROBE: NOT DETECTED
HCOV 229E RNA SPEC QL NAA+PROBE: NOT DETECTED
HCOV HKU1 RNA SPEC QL NAA+PROBE: NOT DETECTED
HCOV NL63 RNA SPEC QL NAA+PROBE: NOT DETECTED
HCOV OC43 RNA SPEC QL NAA+PROBE: NOT DETECTED
HCT VFR BLD AUTO: 41.3 % (ref 34–46.6)
HGB BLD-MCNC: 12.5 G/DL (ref 12–15.9)
HMPV RNA NPH QL NAA+NON-PROBE: NOT DETECTED
HPIV1 RNA ISLT QL NAA+PROBE: NOT DETECTED
HPIV2 RNA SPEC QL NAA+PROBE: NOT DETECTED
HPIV3 RNA NPH QL NAA+PROBE: NOT DETECTED
HPIV4 P GENE NPH QL NAA+PROBE: NOT DETECTED
IMM GRANULOCYTES # BLD AUTO: 0.02 10*3/MM3 (ref 0–0.05)
IMM GRANULOCYTES NFR BLD AUTO: 0.2 % (ref 0–0.5)
IVRT: 85 MS
LEFT ATRIUM VOLUME INDEX: 24.7 ML/M2
LV EF BIPLANE MOD: 65 %
LYMPHOCYTES # BLD AUTO: 1.61 10*3/MM3 (ref 0.7–3.1)
LYMPHOCYTES NFR BLD AUTO: 19.4 % (ref 19.6–45.3)
M PNEUMO IGG SER IA-ACNC: NOT DETECTED
MCH RBC QN AUTO: 28.6 PG (ref 26.6–33)
MCHC RBC AUTO-ENTMCNC: 30.3 G/DL (ref 31.5–35.7)
MCV RBC AUTO: 94.5 FL (ref 79–97)
MONOCYTES # BLD AUTO: 0.7 10*3/MM3 (ref 0.1–0.9)
MONOCYTES NFR BLD AUTO: 8.4 % (ref 5–12)
NEUTROPHILS NFR BLD AUTO: 5.46 10*3/MM3 (ref 1.7–7)
NEUTROPHILS NFR BLD AUTO: 65.8 % (ref 42.7–76)
NRBC BLD AUTO-RTO: 0 /100 WBC (ref 0–0.2)
PLATELET # BLD AUTO: 221 10*3/MM3 (ref 140–450)
PMV BLD AUTO: 9.2 FL (ref 6–12)
POTASSIUM SERPL-SCNC: 3.1 MMOL/L (ref 3.5–5.2)
POTASSIUM SERPL-SCNC: 4.2 MMOL/L (ref 3.5–5.2)
PROT SERPL-MCNC: 5.8 G/DL (ref 6–8.5)
RBC # BLD AUTO: 4.37 10*6/MM3 (ref 3.77–5.28)
RHINOVIRUS RNA SPEC NAA+PROBE: NOT DETECTED
RSV RNA NPH QL NAA+NON-PROBE: NOT DETECTED
SARS-COV-2 RNA RESP QL NAA+PROBE: NOT DETECTED
SODIUM SERPL-SCNC: 144 MMOL/L (ref 136–145)
TSH SERPL DL<=0.05 MIU/L-ACNC: 1.15 UIU/ML (ref 0.27–4.2)
WBC NRBC COR # BLD AUTO: 8.3 10*3/MM3 (ref 3.4–10.8)

## 2025-07-18 PROCEDURE — 93306 TTE W/DOPPLER COMPLETE: CPT | Performed by: INTERNAL MEDICINE

## 2025-07-18 PROCEDURE — 0202U NFCT DS 22 TRGT SARS-COV-2: CPT | Performed by: STUDENT IN AN ORGANIZED HEALTH CARE EDUCATION/TRAINING PROGRAM

## 2025-07-18 PROCEDURE — 99232 SBSQ HOSP IP/OBS MODERATE 35: CPT | Performed by: STUDENT IN AN ORGANIZED HEALTH CARE EDUCATION/TRAINING PROGRAM

## 2025-07-18 PROCEDURE — 63710000001 PREDNISONE PER 1 MG: Performed by: STUDENT IN AN ORGANIZED HEALTH CARE EDUCATION/TRAINING PROGRAM

## 2025-07-18 PROCEDURE — 93356 MYOCRD STRAIN IMG SPCKL TRCK: CPT | Performed by: INTERNAL MEDICINE

## 2025-07-18 PROCEDURE — 94640 AIRWAY INHALATION TREATMENT: CPT

## 2025-07-18 PROCEDURE — 94761 N-INVAS EAR/PLS OXIMETRY MLT: CPT

## 2025-07-18 PROCEDURE — 94799 UNLISTED PULMONARY SVC/PX: CPT

## 2025-07-18 PROCEDURE — 25010000002 HEPARIN (PORCINE) PER 1000 UNITS: Performed by: PHYSICIAN ASSISTANT

## 2025-07-18 PROCEDURE — 93356 MYOCRD STRAIN IMG SPCKL TRCK: CPT

## 2025-07-18 PROCEDURE — 97162 PT EVAL MOD COMPLEX 30 MIN: CPT

## 2025-07-18 PROCEDURE — 85025 COMPLETE CBC W/AUTO DIFF WBC: CPT | Performed by: PHYSICIAN ASSISTANT

## 2025-07-18 PROCEDURE — 84132 ASSAY OF SERUM POTASSIUM: CPT | Performed by: STUDENT IN AN ORGANIZED HEALTH CARE EDUCATION/TRAINING PROGRAM

## 2025-07-18 PROCEDURE — 80053 COMPREHEN METABOLIC PANEL: CPT | Performed by: PHYSICIAN ASSISTANT

## 2025-07-18 PROCEDURE — 99222 1ST HOSP IP/OBS MODERATE 55: CPT | Performed by: INTERNAL MEDICINE

## 2025-07-18 PROCEDURE — 93306 TTE W/DOPPLER COMPLETE: CPT

## 2025-07-18 PROCEDURE — 84443 ASSAY THYROID STIM HORMONE: CPT | Performed by: PHYSICIAN ASSISTANT

## 2025-07-18 PROCEDURE — 97166 OT EVAL MOD COMPLEX 45 MIN: CPT

## 2025-07-18 RX ORDER — ACETAMINOPHEN 325 MG/1
650 TABLET ORAL EVERY 4 HOURS PRN
Status: DISCONTINUED | OUTPATIENT
Start: 2025-07-18 | End: 2025-07-22 | Stop reason: HOSPADM

## 2025-07-18 RX ORDER — NITROGLYCERIN 0.4 MG/1
0.4 TABLET SUBLINGUAL
Status: DISCONTINUED | OUTPATIENT
Start: 2025-07-18 | End: 2025-07-22 | Stop reason: HOSPADM

## 2025-07-18 RX ORDER — SODIUM CHLORIDE 9 MG/ML
40 INJECTION, SOLUTION INTRAVENOUS AS NEEDED
Status: DISCONTINUED | OUTPATIENT
Start: 2025-07-18 | End: 2025-07-22 | Stop reason: HOSPADM

## 2025-07-18 RX ORDER — FUROSEMIDE 40 MG/1
40 TABLET ORAL DAILY
Status: ON HOLD | COMMUNITY
End: 2025-07-21

## 2025-07-18 RX ORDER — BUMETANIDE 0.25 MG/ML
2 INJECTION, SOLUTION INTRAMUSCULAR; INTRAVENOUS
Status: DISCONTINUED | OUTPATIENT
Start: 2025-07-18 | End: 2025-07-19

## 2025-07-18 RX ORDER — AZITHROMYCIN 250 MG/1
500 TABLET, FILM COATED ORAL
Status: COMPLETED | OUTPATIENT
Start: 2025-07-18 | End: 2025-07-20

## 2025-07-18 RX ORDER — AMOXICILLIN 250 MG
2 CAPSULE ORAL 2 TIMES DAILY PRN
Status: DISCONTINUED | OUTPATIENT
Start: 2025-07-18 | End: 2025-07-19

## 2025-07-18 RX ORDER — POLYETHYLENE GLYCOL 3350 17 G/17G
17 POWDER, FOR SOLUTION ORAL DAILY PRN
Status: DISCONTINUED | OUTPATIENT
Start: 2025-07-18 | End: 2025-07-19

## 2025-07-18 RX ORDER — PREDNISONE 20 MG/1
40 TABLET ORAL
Status: COMPLETED | OUTPATIENT
Start: 2025-07-18 | End: 2025-07-18

## 2025-07-18 RX ORDER — SODIUM CHLORIDE 0.9 % (FLUSH) 0.9 %
10 SYRINGE (ML) INJECTION EVERY 12 HOURS SCHEDULED
Status: DISCONTINUED | OUTPATIENT
Start: 2025-07-18 | End: 2025-07-22 | Stop reason: HOSPADM

## 2025-07-18 RX ORDER — ACETAMINOPHEN 650 MG/1
650 SUPPOSITORY RECTAL EVERY 4 HOURS PRN
Status: DISCONTINUED | OUTPATIENT
Start: 2025-07-18 | End: 2025-07-22 | Stop reason: HOSPADM

## 2025-07-18 RX ORDER — HEPARIN SODIUM 5000 [USP'U]/ML
5000 INJECTION, SOLUTION INTRAVENOUS; SUBCUTANEOUS EVERY 12 HOURS SCHEDULED
Status: DISCONTINUED | OUTPATIENT
Start: 2025-07-18 | End: 2025-07-19

## 2025-07-18 RX ORDER — POTASSIUM CHLORIDE 750 MG/1
10 CAPSULE, EXTENDED RELEASE ORAL DAILY
Status: ON HOLD | COMMUNITY
End: 2025-07-21

## 2025-07-18 RX ORDER — SODIUM CHLORIDE 0.9 % (FLUSH) 0.9 %
10 SYRINGE (ML) INJECTION AS NEEDED
Status: DISCONTINUED | OUTPATIENT
Start: 2025-07-18 | End: 2025-07-22 | Stop reason: HOSPADM

## 2025-07-18 RX ORDER — POTASSIUM CHLORIDE 1500 MG/1
40 TABLET, EXTENDED RELEASE ORAL ONCE
Status: COMPLETED | OUTPATIENT
Start: 2025-07-18 | End: 2025-07-18

## 2025-07-18 RX ORDER — BUDESONIDE AND FORMOTEROL FUMARATE DIHYDRATE 160; 4.5 UG/1; UG/1
2 AEROSOL RESPIRATORY (INHALATION)
Status: DISCONTINUED | OUTPATIENT
Start: 2025-07-18 | End: 2025-07-22 | Stop reason: HOSPADM

## 2025-07-18 RX ORDER — BISACODYL 10 MG
10 SUPPOSITORY, RECTAL RECTAL DAILY PRN
Status: DISCONTINUED | OUTPATIENT
Start: 2025-07-18 | End: 2025-07-19

## 2025-07-18 RX ORDER — IPRATROPIUM BROMIDE AND ALBUTEROL SULFATE 2.5; .5 MG/3ML; MG/3ML
3 SOLUTION RESPIRATORY (INHALATION)
Status: DISCONTINUED | OUTPATIENT
Start: 2025-07-18 | End: 2025-07-22 | Stop reason: HOSPADM

## 2025-07-18 RX ORDER — LISINOPRIL 5 MG/1
5 TABLET ORAL DAILY
Status: ON HOLD | COMMUNITY
End: 2025-07-21

## 2025-07-18 RX ORDER — BISACODYL 5 MG/1
5 TABLET, DELAYED RELEASE ORAL DAILY PRN
Status: DISCONTINUED | OUTPATIENT
Start: 2025-07-18 | End: 2025-07-19

## 2025-07-18 RX ORDER — BUMETANIDE 2 MG/1
2 TABLET ORAL
Status: DISCONTINUED | OUTPATIENT
Start: 2025-07-18 | End: 2025-07-19

## 2025-07-18 RX ORDER — FLUTICASONE FUROATE, UMECLIDINIUM BROMIDE AND VILANTEROL TRIFENATATE 100; 62.5; 25 UG/1; UG/1; UG/1
1 POWDER RESPIRATORY (INHALATION)
Status: ON HOLD | COMMUNITY
End: 2025-07-21

## 2025-07-18 RX ORDER — ACETAMINOPHEN 160 MG/5ML
650 SOLUTION ORAL EVERY 4 HOURS PRN
Status: DISCONTINUED | OUTPATIENT
Start: 2025-07-18 | End: 2025-07-22 | Stop reason: HOSPADM

## 2025-07-18 RX ORDER — ONDANSETRON 2 MG/ML
4 INJECTION INTRAMUSCULAR; INTRAVENOUS EVERY 6 HOURS PRN
Status: DISCONTINUED | OUTPATIENT
Start: 2025-07-18 | End: 2025-07-22 | Stop reason: HOSPADM

## 2025-07-18 RX ORDER — NICOTINE 21 MG/24HR
1 PATCH, TRANSDERMAL 24 HOURS TRANSDERMAL
Status: DISCONTINUED | OUTPATIENT
Start: 2025-07-18 | End: 2025-07-22 | Stop reason: HOSPADM

## 2025-07-18 RX ORDER — POTASSIUM CHLORIDE 1500 MG/1
40 TABLET, EXTENDED RELEASE ORAL EVERY 4 HOURS
Status: COMPLETED | OUTPATIENT
Start: 2025-07-18 | End: 2025-07-18

## 2025-07-18 RX ADMIN — IPRATROPIUM BROMIDE AND ALBUTEROL SULFATE 3 ML: 2.5; .5 SOLUTION RESPIRATORY (INHALATION) at 16:08

## 2025-07-18 RX ADMIN — MAGNESIUM OXIDE TAB 400 MG (240 MG ELEMENTAL MG) 400 MG: 400 (240 MG) TAB at 21:47

## 2025-07-18 RX ADMIN — BUDESONIDE AND FORMOTEROL FUMARATE DIHYDRATE 2 PUFF: 160; 4.5 AEROSOL RESPIRATORY (INHALATION) at 08:40

## 2025-07-18 RX ADMIN — MAGNESIUM OXIDE TAB 400 MG (240 MG ELEMENTAL MG) 400 MG: 400 (240 MG) TAB at 08:29

## 2025-07-18 RX ADMIN — Medication 5 MG: at 22:05

## 2025-07-18 RX ADMIN — Medication 10 ML: at 08:24

## 2025-07-18 RX ADMIN — HEPARIN SODIUM 5000 UNITS: 5000 INJECTION INTRAVENOUS; SUBCUTANEOUS at 21:46

## 2025-07-18 RX ADMIN — Medication 10 ML: at 00:42

## 2025-07-18 RX ADMIN — BUDESONIDE AND FORMOTEROL FUMARATE DIHYDRATE 2 PUFF: 160; 4.5 AEROSOL RESPIRATORY (INHALATION) at 20:05

## 2025-07-18 RX ADMIN — POTASSIUM CHLORIDE 40 MEQ: 1500 TABLET, EXTENDED RELEASE ORAL at 14:40

## 2025-07-18 RX ADMIN — BUMETANIDE 2 MG: 2 TABLET ORAL at 08:29

## 2025-07-18 RX ADMIN — IPRATROPIUM BROMIDE AND ALBUTEROL SULFATE 3 ML: 2.5; .5 SOLUTION RESPIRATORY (INHALATION) at 20:05

## 2025-07-18 RX ADMIN — POTASSIUM CHLORIDE 40 MEQ: 1500 TABLET, EXTENDED RELEASE ORAL at 00:41

## 2025-07-18 RX ADMIN — HEPARIN SODIUM 5000 UNITS: 5000 INJECTION INTRAVENOUS; SUBCUTANEOUS at 08:23

## 2025-07-18 RX ADMIN — POTASSIUM CHLORIDE 40 MEQ: 1500 TABLET, EXTENDED RELEASE ORAL at 10:01

## 2025-07-18 RX ADMIN — PREDNISONE 40 MG: 20 TABLET ORAL at 14:40

## 2025-07-18 RX ADMIN — Medication 10 ML: at 21:47

## 2025-07-18 RX ADMIN — AZITHROMYCIN DIHYDRATE 500 MG: 250 TABLET ORAL at 14:40

## 2025-07-18 RX ADMIN — POTASSIUM CHLORIDE 40 MEQ: 1500 TABLET, EXTENDED RELEASE ORAL at 17:55

## 2025-07-18 RX ADMIN — BUMETANIDE 2 MG: 2 TABLET ORAL at 17:55

## 2025-07-18 RX ADMIN — NICOTINE 1 PATCH: 21 PATCH, EXTENDED RELEASE TRANSDERMAL at 12:43

## 2025-07-18 NOTE — H&P
[facility] Medicine Services  HISTORY AND PHYSICAL    Patient Name: Elisabeth Burger  : 1950  MRN: [id number]  Primary Care Physician: Provider, No Known  Date of admission: 2025    Subjective   Subjective     Chief Complaint:  Lower extremity edema    HPI:  Elisabeth Burger is a 74 y.o. female with a past medical history significant for COPD with chronic respiratory failure on 4L NC, known pulmonary nodule, former tobacco abuse, and malnutrition. Patient was also apparently prescribed Eliquis and Lasix since 2024 for unknown reason. Daughter at bedside states patient has been out of meds for several weeks. Comes in today with complaints of progressively worsening SOB and bilateral lower extremity edema going on for the past week. Daughter at bedside also reports rectal bleeding with bowel movements. Apparently there is an area of excoriation around anus. Now here for further evaluation and treatment.  Currently denies fever, cough, congestion, syncope or chest pain. No abdominal pain or N/v/D. No headache or focal weakness/ paresthesias.         Review of Systems   Constitutional:  Positive for fatigue. Negative for chills.   HENT:  Negative for congestion and trouble swallowing.    Eyes:  Negative for discharge and itching.   Respiratory:  Positive for shortness of breath. Negative for cough.    Cardiovascular:  Positive for leg swelling. Negative for chest pain.   Gastrointestinal:  Negative for abdominal pain, diarrhea, nausea and vomiting.   Endocrine: Negative for cold intolerance and heat intolerance.   Genitourinary:  Negative for dysuria and flank pain.   Musculoskeletal:  Negative for gait problem and joint swelling.   Skin:  Negative for pallor and rash.   Allergic/Immunologic: Negative for immunocompromised state.   Neurological:  Positive for weakness. Negative for dizziness and headaches.   Hematological:  Negative for adenopathy.    Psychiatric/Behavioral:  Negative for agitation and confusion.             Personal History     Past Medical History:   Diagnosis Date    COPD (chronic obstructive pulmonary disease)            History reviewed. No pertinent surgical history.    Family History:  family history includes Stroke in her father and mother.     Social History:  reports that she quit smoking about 2 years ago. Her smoking use included cigarettes. She started smoking about 52 years ago. She has a 25 pack-year smoking history. She has been exposed to tobacco smoke. She has never used smokeless tobacco. She reports that she does not drink alcohol and does not use drugs.  Social History     Social History Narrative    Hasn't smoked since her hospitalization 6/2023       Medications:  albuterol, budesonide-formoterol, nicotine, and tiotropium bromide monohydrate    No Known Allergies    Objective   Objective     Vital Signs:   Temp:  [98.7 °F (37.1 °C)] 98.7 °F (37.1 °C)  Heart Rate:  [69-99] 69  Resp:  [26] 26  BP: ()/(53-68) 102/57    Physical Exam   Constitutional: Awake, alert  Eyes: PERRLA, sclerae anicteric, no conjunctival injection  HENT: NCAT, mucous membranes moist  Neck: Supple, no thyromegaly, no lymphadenopathy, trachea midline  Respiratory: Clear to auscultation bilaterally, nonlabored respirations   Cardiovascular: RRR, no murmurs, rubs, or gallops, palpable pedal pulses bilaterally  Gastrointestinal: Positive bowel sounds, soft, nontender, nondistended  Musculoskeletal: BLE edema no clubbing or cyanosis to extremities  Psychiatric: Appropriate affect, cooperative  Neurologic: Oriented x 3, strength symmetric in all extremities, Cranial Nerves grossly intact to confrontation, speech clear  Skin: No rashes      Result Review:  I have personally reviewed the results from the time of this admission to 7/17/2025 23:38 EDT and agree with these findings:  [x]  Laboratory list / accordion  []  Microbiology  [x]  Radiology  []   EKG/Telemetry   []  Cardiology/Vascular   []  Pathology  []  Old records  []  Other:  Most notable findings include: /57. 72% on RA. Potassium 2.8. imaging notes anasarca    LAB RESULTS:      Lab 07/17/25 2016   WBC 9.97   HEMOGLOBIN 13.1   HEMATOCRIT 43.5   PLATELETS 232   NEUTROS ABS 6.53   IMMATURE GRANS (ABS) 0.03   LYMPHS ABS 2.39   MONOS ABS 0.64   EOS ABS 0.31   MCV 94.0         Lab 07/17/25 2016   SODIUM 140   POTASSIUM 2.8*   CHLORIDE 97*   CO2 35.3*   ANION GAP 7.7   BUN 6.2*   CREATININE 0.66   EGFR 91.0   GLUCOSE 147*   CALCIUM 8.6   MAGNESIUM 1.9   HEMOGLOBIN A1C 5.50         Lab 07/17/25 2016   TOTAL PROTEIN 5.9*   ALBUMIN 3.5   GLOBULIN 2.4   ALT (SGPT) 33   AST (SGOT) 40*   BILIRUBIN 0.2   ALK PHOS 96         Lab 07/17/25 2143 07/17/25 2016   PROBNP  --  2,586.0*   HSTROP T 26* 27*                 Brief Urine Lab Results  (Last result in the past 365 days)        Color   Clarity   Blood   Leuk Est   Nitrite   Protein   CREAT   Urine HCG        07/17/25 2220 Yellow   Clear   Trace   Small (1+)   Negative   Negative                 Microbiology Results (last 10 days)       Procedure Component Value - Date/Time    COVID PRE-OP / PRE-PROCEDURE SCREENING ORDER (NO ISOLATION) - Swab, Nasopharynx [[id number]]  (Normal) Collected: 07/17/25 2135    Lab Status: Final result Specimen: Swab from Nasopharynx Updated: 07/17/25 2212    Narrative:      The following orders were created for panel order COVID PRE-OP / PRE-PROCEDURE SCREENING ORDER (NO ISOLATION) - Swab, Nasopharynx.  Procedure                               Abnormality         Status                     ---------                               -----------         ------                     COVID-19 and FLU A/B PCR...[[id number]]  Normal              Final result                 Please view results for these tests on the individual orders.    COVID-19 and FLU A/B PCR, 1 HR TAT - Swab, Nasopharynx [[id number]]  (Normal) Collected: 07/17/25 2135     Lab Status: Final result Specimen: Swab from Nasopharynx Updated: 07/17/25 2212     COVID19 Not Detected     Influenza A PCR Not Detected     Influenza B PCR Not Detected            CT Abdomen Pelvis Without Contrast  Result Date: 7/17/2025  CT ABDOMEN PELVIS WO CONTRAST Date of Exam: 7/17/2025 8:53 PM EDT Indication: Acute abdominal pain. Comparison: None available Technique: Axial CT images were obtained of the abdomen and pelvis without the administration of contrast. Reconstructed coronal and sagittal images were also obtained. Automated exposure control and iterative construction methods were used. Findings: The heart size is normal. There is no pericardial effusion. There is a trace right-sided pleural effusion. There is peripheral bronchiectasis and bronchial wall thickening within the left lower lobe and right middle lobe. The liver is normal in size. There is no focal liver lesion by noncontrast technique. The gallbladder is surgically absent. There is no intrahepatic or extrahepatic biliary ductal dilatation. The spleen is normal in size. The adrenal glands and pancreas appear within normal limits for noncontrast technique. The kidneys are symmetric in size. There is no hydronephrosis or urolithiasis. The urinary bladder is fluid-filled without wall thickening. The stomach and duodenum are normal in caliber and configuration. There are no abnormally dilated loops of small bowel to suggest small bowel obstruction. There is a small bowel containing left inguinal hernia. There is pancolonic diverticulosis without clear diverticulitis. There is diffuse edema and small volume ascites throughout the abdomen and pelvis. There is diffuse body wall edema and anasarca. The aorta is normal in caliber without evidence of aneurysm formation. There is aorto biiliac atherosclerotic calcification. There is degenerative disc disease at L4-L5 and L5-S1. There is inferior endplate compression deformity of the L2 vertebral  body.     Impression: Impression: 1.Diffuse body wall edema and anasarca with small volume ascites and trace right-sided pleural effusion. 2.Small bowel containing left inguinal hernia without evidence of obstruction. 3.Pancolonic diverticulosis without diverticulitis. 4.Chronic appearing inferior endplate compression deformity of the L2 vertebral body. Electronically Signed: Juwan Marion MD  7/17/2025 9:05 PM EDT  Workstation ID: [id number]    XR Chest 1 View  Result Date: 7/17/2025  XR CHEST 1 VW Date of Exam: 7/17/2025 7:47 PM EDT Indication: Shortness of breath Comparison: 6/20/2023. Findings: The heart size is normal. There is diffuse interstitial prominence which probably is due to pulmonary interstitial edema although this can be seen with an atypical infection. There is no pleural effusion or pneumothorax. There are chronic age-related changes involving the bony thorax and thoracic aorta.     Impression: Impression: Diffuse interstitial prominence which probably is due to pulmonary interstitial edema although this can be seen with an atypical infection. Electronically Signed: Joe Rice MD  7/17/2025 8:24 PM EDT  Workstation ID: [id number]      Results for orders placed during the hospital encounter of 06/19/23    Adult Transthoracic Echo Complete W/ Cont if Necessary Per Protocol 06/23/2023 10:36 AM    Interpretation Summary    Left ventricular systolic function is normal. Calculated left ventricular EF = 56.6% Left ventricular ejection fraction appears to be 56 - 60%.    Left ventricular diastolic function is consistent with (grade I) impaired relaxation.    The right ventricular cavity is moderately dilated.    The right atrial cavity is mildly  dilated.    Estimated right ventricular systolic pressure from tricuspid regurgitation is normal (<35 mmHg).      Assessment & Plan   Assessment & Plan       CHF exacerbation    Rectal bleeding    COPD (? Severity)    Pulmonary nodule (7mm LLL incidental)     Chronic respiratory failure with hypoxia    Supplemental oxygen dependent    Severe malnutrition      75 yo female here with heart failure    CHF Exacerbation  - hemodynamically stable, bu BP labile. Holding further diuresis for now  - imaging notes anasarca with pulmonary edema and small ascites  - on lasix for unknown reason, out for several weeks  - TTE 6/2023 with EF 56 to 60%; right ventricular systolic pressure from tricuspid regurgitation is normal (<35 mmHg); Left ventricular diastolic function is consistent with (grade I) impaired relaxation.   - trop 27, then 26. Continue to trend cardiac biomarkers  - no EKG changes  - administered 80 mg lasix in ED  - strict I&O  - daily weights  - check TSH, magnesium  - initiate heart failure pathway  - consult to cardiology  - repeat echocardiogram  - close monitor on telemetry  - am labs    Hypokalemia  - replace as needed per protocol  - no EKG changes  - check magnesium    Chronic Respiratory Failure  COPD  - Known Pulmonary Nodule; 7 x 6 x 5 mm diameter medial left lower lobe pulmonary nodule seen on CT.   - followed by pulmonology outpatient  - former smoker  - on 4L NC at baseline. Stable.  - continue Symbicort + Spiriva   - as needed pulmonary toilet    Medication Non Compliance  - ? Was on Eliquis or unknown reason, daughter suspects 2/2 prior DVT  - consult pharmacy    Malnutrition BMI 14  Failure to thrive  - consult to nutrition  - PT/OT      DVT prophylaxis:  heparin    CODE STATUS:  full code  Code Status (Patient has no pulse and is not breathing): CPR (Attempt to Resuscitate)  Medical Interventions (Patient has pulse or is breathing): Full Support  Level Of Support Discussed With: Patient      Expected Discharge  TBD      This note has been completed as part of a split-shared workflow.     Signature: Electronically signed by Tona Ordaz PA-C, 07/17/25, 11:38 PM EDT

## 2025-07-18 NOTE — PLAN OF CARE
Goal Outcome Evaluation:  Plan of Care Reviewed With: patient           Outcome Evaluation: Pt presents below baseline with ADLs d/t decr safety awareness, weakness, decr balance and activity tolerance.  Pt min A LBD,  CGA to ambulate without AD. OT will follow to advance pt toward PLOF.  Recommend home with 24/7 assist and HHOT upon d/c.    Anticipated Discharge Disposition (OT): home with 24/7 care, home with home health

## 2025-07-18 NOTE — CONSULTS
Malnutrition Severity Assessment    Patient Name:  Elisabeth Burger  YOB: 1950  MRN: [id number]  Admit Date:  7/17/2025    Patient meets criteria for : Severe Malnutrition    Comments:  Pt meets criteria for severe malnutrition in the context of chronic illness indicated by severe muscle wasting and subcutaneous fat loss    Malnutrition Severity Assessment  Malnutrition Type: Chronic Disease - Related Malnutrition  Malnutrition Type (Last 8 Hours)       Malnutrition Severity Assessment       Row Name 07/18/25 1331       Malnutrition Severity Assessment    Malnutrition Type Chronic Disease - Related Malnutrition      Row Name 07/18/25 1331       Muscle Loss    Loss of Muscle Mass Findings Severe    [place] Region Severe - deep hollowing/scooping, lack of muscle to touch, facial bones well defined    Clavicle Bone Region Severe - protruding prominent bone    Acromion Bone Region Severe - squared shoulders, bones, and acromion process protrusion prominent    Dorsal Hand Region Severe - prominent depression    Patellar Region Severe - prominent bone, square looking, very little muscle definition    Anterior Thigh Region Severe - line/depression along thigh, obviously thin    Posterior Calf Region Severe - thin with very little definition/firmness      Row Name 07/18/25 1331       Fat Loss    Subcutaneous Fat Loss Findings Severe    Orbital Region  Severe - pronounced hollowness/depression, dark circles, loose saggy skin    Upper Arm Region Severe - mostly skin, very little space between folds, fingers touch      Row Name 07/18/25 1331       Criteria Met (Must meet criteria for severity in at least 2 of these categories: M Wasting, Fat Loss, Fluid, Secondary Signs, Wt. Status, Intake)    Patient meets criteria for  Severe Malnutrition                    Electronically signed by:  Slime Villatoro, MS,RD,LD  07/18/25 13:32 EDT

## 2025-07-18 NOTE — CONSULTS
"          Clinical Nutrition Assessment     Patient Name: Elisabeth Burger  YOB: 1950  MRN: [id number]  Date of Encounter: 07/18/25 13:07 EDT  Admission date: 7/17/2025  Reason for Visit: Malnutrition Severity Assessment, Consult    Assessment   Nutrition Assessment   Admission Diagnosis:  CHF exacerbation [I50.9]    Problem List:    CHF exacerbation    Severe malnutrition    COPD (? Severity)    Pulmonary nodule (7mm LLL incidental)    Chronic respiratory failure with hypoxia    Supplemental oxygen dependent    Rectal bleeding      PMH:   She  has a past medical history of COPD (chronic obstructive pulmonary disease).    PSH:  She  has no past surgical history on file.    Applicable Nutrition History:       Anthropometrics     Height: Height: 170.2 cm (67.01\")  Last Filed Weight: Weight: 40.8 kg (89 lb 15.2 oz) (07/18/25 0950)  Method:    BMI: BMI (Calculated): 14.1    UBW:  ? 100lbs per EMR  Weight change: possible weight loss of 26 lbs (22.7%) over 1 year(s)    Significant?  Yes  Noted UBW wt of 115lbs in 2023.     Nutrition Focused Physical Exam    Date: 7/18    Patient meets criteria for malnutrition diagnosis, see MSA note.     Subjective   Reported/Observed/Food/Nutrition Related History:     Nutrition hx obtained from pt and able to reach dtr via phone-somewhat difficult to follow and unable to provide much detail. Pt reports decreased po intake 2/2 rectal bleeding. Dtr notes pt has had wt loss over the past year. Dtr states she helps pt w/meals at home, doesn't usually eat breakfast but eats well at lunch and dinner. Pt notes she wants some salt w/trays. Pt does not drink ONS and declines ONS at this time. NKFA.     Current Nutrition Prescription   PO: Diet: Cardiac; Healthy Heart (2-3 Na+); Fluid Consistency: Thin (IDDSI 0)  Oral Nutrition Supplement:   Intake: 75% breakfast, RD observed pt ate 50% of lunch    Assessment & Plan   Nutrition Diagnosis   Date:  7/18            Updated:  "   Problem Malnutrition  chronic severe   Etiology Inability to consume sufficient energy 2/2 COPD, CHF   Signs/Symptoms Severe muscle wasting and subcutaneous fat loss   Status: New    Goal / Objectives:   Nutrition to support treatment and Increase intake      Nutrition Intervention      Follow treatment progress, Care plan reviewed, Encourage intake, Supplement offered/refused    Pt meets criteria for chronic malnutrition (see MSA)    Encourage po intake  Pt declines ONS    Monitoring/Evaluation:   Per protocol, PO intake, Weight    Slime Villatoro, MS,RD,LD  Time Spent:30

## 2025-07-18 NOTE — ED NOTES
" Elisabeth Burger    Nursing Report ED to Floor:  Mental status: aox4  Ambulatory status: assist x 1  Oxygen Therapy:  5l nc  Cardiac Rhythm: SR  Admitted from: home  Safety Concerns:  none  Precautions: none  Social Issues: none  ED Room #:  21    ED Nurse Phone Extension - [phone] or may call [phone].      HPI:   Chief Complaint   Patient presents with    Shortness of Breath    Vaginal Bleeding       Past Medical History:  Past Medical History:   Diagnosis Date    COPD (chronic obstructive pulmonary disease)         Past Surgical History:  No past surgical history on file.     Admitting Doctor:   No admitting provider for patient encounter.    Consulting Provider(s):  Consults       No orders found from 6/18/2025 to 7/18/2025.             Admitting Diagnosis:   The primary encounter diagnosis was Acute on chronic congestive heart failure, unspecified heart failure type. Diagnoses of Pulmonary congestion, Peripheral edema, Hypoxia, and Acute hypokalemia were also pertinent to this visit.    Most Recent Vitals:   Vitals:    07/17/25 1943 07/17/25 2000 07/17/25 2100 07/17/25 2130   BP: 119/55 118/68 99/53 102/55   Pulse: 99  81 77   Resp: 26      Temp: 98.7 °F (37.1 °C)      SpO2: (!) 72%  100% 97%   Weight: 40.8 kg (90 lb)      Height: 170.2 cm (67\")          Active LDAs/IV Access:   Lines, Drains & Airways       Active LDAs       Name Placement date Placement time Site Days    Peripheral IV 07/17/25 2123 20 G Anterior;Right Forearm 07/17/25 2123  Forearm  less than 1                    Labs (abnormal labs have a star):   Labs Reviewed   COMPREHENSIVE METABOLIC PANEL - Abnormal; Notable for the following components:       Result Value    Glucose 147 (*)     BUN 6.2 (*)     Potassium 2.8 (*)     Chloride 97 (*)     CO2 35.3 (*)     Total Protein 5.9 (*)     AST (SGOT) 40 (*)     All other components within normal limits    Narrative:     GFR Categories in Chronic Kidney Disease (CKD)              GFR Category          GFR " (mL/min/1.73)    Interpretation  G1                    90 or greater        Normal or high (1)  G2                    60-89                Mild decrease (1)  G3a                   45-59                Mild to moderate decrease  G3b                   30-44                Moderate to severe decrease  G4                    15-29                Severe decrease  G5                    14 or less           Kidney failure    (1)In the absence of evidence of kidney disease, neither GFR category G1 or G2 fulfill the criteria for CKD.    eGFR calculation 2021 CKD-EPI creatinine equation, which does not include race as a factor   BNP (IN-HOUSE) - Abnormal; Notable for the following components:    proBNP 2,586.0 (*)     All other components within normal limits    Narrative:     This assay is used as an aid in the diagnosis of individuals suspected of having heart failure. It can be used as an aid in the diagnosis of acute decompensated heart failure (ADHF) in patients presenting with signs and symptoms of ADHF to the emergency department (ED). In addition, NT-proBNP of <300 pg/mL indicates ADHF is not likely.    Age Range Result Interpretation  NT-proBNP Concentration (pg/mL:      <50             Positive            >450                   Gray                 300-450                    Negative             <300    50-75           Positive            >900                  Gray                300-900                  Negative            <300      >75             Positive            >1800                  Gray                300-1800                  Negative            <300   TROPONIN - Abnormal; Notable for the following components:    HS Troponin T 27 (*)     All other components within normal limits    Narrative:     High Sensitive Troponin T Reference Range:  <14.0 ng/L- Negative Female for AMI  <22.0 ng/L- Negative Male for AMI  >=14 - Abnormal Female indicating possible myocardial injury.  >=22 - Abnormal Male indicating  possible myocardial injury.   Clinicians would have to utilize clinical acumen, EKG, Troponin, and serial changes to determine if it is an Acute Myocardial Infarction or myocardial injury due to an underlying chronic condition.        CBC WITH AUTO DIFFERENTIAL - Abnormal; Notable for the following components:    MCHC 30.1 (*)     All other components within normal limits   MAGNESIUM - Normal   COVID PRE-OP / PRE-PROCEDURE SCREENING ORDER (NO ISOLATION)    Narrative:     The following orders were created for panel order COVID PRE-OP / PRE-PROCEDURE SCREENING ORDER (NO ISOLATION) - Swab, Nasopharynx.  Procedure                               Abnormality         Status                     ---------                               -----------         ------                     COVID-19 and FLU A/B PCR...[[id number]]                      In process                   Please view results for these tests on the individual orders.   COVID-19 AND FLU A/B, NP SWAB IN TRANSPORT MEDIA 1 HR TAT   RAINBOW DRAW    Narrative:     The following orders were created for panel order [place] Draw.  Procedure                               Abnormality         Status                     ---------                               -----------         ------                     Green Top (Gel)[[id number]]                                  Final result               Lavender Top[[id number]]                                     Final result               Gold Top - SST[[id number]]                                   Final result               [place] Top[[id number]]                                         Final result               Light Blue Top[[id number]]                                   Final result                 Please view results for these tests on the individual orders.   URINALYSIS W/ CULTURE IF INDICATED   HIGH SENSITIVITIY TROPONIN T 1HR   CBC AND DIFFERENTIAL    Narrative:     The following orders were created for panel order CBC &  Differential.  Procedure                               Abnormality         Status                     ---------                               -----------         ------                     CBC Auto Differential[[id number]]        Abnormal            Final result                 Please view results for these tests on the individual orders.   GREEN TOP   LAVENDER TOP   GOLD TOP - SST   GRAY TOP   LIGHT BLUE TOP       Meds Given in ED:   Medications   sodium chloride 0.9 % flush 10 mL (has no administration in time range)   sodium chloride 0.9 % flush 10 mL (has no administration in time range)   sodium chloride 0.9 % bolus 1,000 mL (1,000 mL Intravenous New Bag 7/17/25 2128)   Potassium Replacement - Follow Nurse / BPA Driven Protocol (has no administration in time range)   furosemide (LASIX) injection 80 mg (80 mg Intravenous Given 7/17/25 2129)           Last NIH score:                                                          Dysphagia screening results:  Patient Factors Component (Dysphagia:Stroke or Rule-out)  Best Eye Response: 4-->(E4) spontaneous (07/17/25 2152)  Best Motor Response: 6-->(M6) obeys commands (07/17/25 2152)  Best Verbal Response: 5-->(V5) oriented (07/17/25 2152)  Karan Coma Scale Score: 15 (07/17/25 2152)     Karan Coma Scale:  No data recorded     CIWA:        Restraint Type:            Isolation Status:  No active isolations

## 2025-07-18 NOTE — ED PROVIDER NOTES
Subjective   History of Present Illness  74-year-old female brought in by son for evaluation of reported vaginal bleeding and shortness of breath.  The patient at baseline wears 4 L nasal cannula oxygen secondary to underlying COPD.  There is a strong smell of smoke on the patient upon initial evaluation.  The patient reports that roughly 1 month ago she had a procedure where something was taken off her cervix, she describes as like a colposcopy.  She reports that she has had continued oozing of blood since that given time.  She also reports over the last week she has had increased edema to the bilateral ankles with associated increased shortness of breath.  She does not report a previous history of CHF.  Records indicate she is supposed to take diuretics, but she does not take them on a regular basis.  She denies fever or infectious symptoms.  No sick contacts.  She reports that each time she used the bathroom she notices blood in the toilet.      Review of Systems   Constitutional:  Negative for chills, fatigue and fever.   HENT:  Negative for congestion, ear pain, postnasal drip, sinus pressure and sore throat.    Eyes:  Negative for pain, redness and visual disturbance.   Respiratory:  Positive for shortness of breath. Negative for cough and chest tightness.    Cardiovascular:  Positive for leg swelling. Negative for chest pain and palpitations.   Gastrointestinal:  Positive for blood in stool. Negative for abdominal pain, anal bleeding, diarrhea, nausea and vomiting.   Endocrine: Negative for polydipsia and polyuria.   Genitourinary:  Positive for vaginal bleeding. Negative for difficulty urinating, dysuria, frequency and urgency.   Musculoskeletal:  Negative for arthralgias, back pain and neck pain.   Skin:  Negative for pallor and rash.   Allergic/Immunologic: Negative for environmental allergies and immunocompromised state.   Neurological:  Negative for dizziness, weakness and headaches.   Hematological:   Negative for adenopathy.   Psychiatric/Behavioral:  Negative for confusion, self-injury and suicidal ideas. The patient is not nervous/anxious.    All other systems reviewed and are negative.      Past Medical History:   Diagnosis Date    COPD (chronic obstructive pulmonary disease)        No Known Allergies    History reviewed. No pertinent surgical history.    Family History   Problem Relation Age of Onset    Stroke Mother     Stroke Father        Social History     Socioeconomic History    Marital status:    Tobacco Use    Smoking status: Former     Current packs/day: 0.00     Average packs/day: 0.5 packs/day for 50.0 years (25.0 ttl pk-yrs)     Types: Cigarettes     Start date:      Quit date:      Years since quittin.5     Passive exposure: Current    Smokeless tobacco: Never   Vaping Use    Vaping status: Never Used   Substance and Sexual Activity    Alcohol use: Never    Drug use: Never    Sexual activity: Defer           Objective   Physical Exam  Vitals and nursing note reviewed.   Constitutional:       General: She is not in acute distress.     Appearance: Normal appearance. She is well-developed. She is not toxic-appearing or diaphoretic.   HENT:      Head: Normocephalic and atraumatic.      Right Ear: External ear normal.      Left Ear: External ear normal.      Nose: Nose normal.   Eyes:      General: Lids are normal.      Pupils: Pupils are equal, round, and reactive to light.   Neck:      Trachea: No tracheal deviation.   Cardiovascular:      Rate and Rhythm: Normal rate and regular rhythm.      Pulses: No decreased pulses.      Heart sounds: Normal heart sounds. No murmur heard.     No friction rub. No gallop.   Pulmonary:      Effort: Pulmonary effort is normal. No respiratory distress.      Breath sounds: Normal breath sounds. No decreased breath sounds, wheezing, rhonchi or rales.   Abdominal:      General: Bowel sounds are normal.      Palpations: Abdomen is soft.       Tenderness: There is no abdominal tenderness. There is no guarding or rebound.      Comments: Rectal exam shows excoriation at the anus secondary to recent frequent bowel movements.  The stool itself does not appear to have blood present.    Occult stool test was positive due to blood from excoriated rectal tissue.    H&H is normal and stable.   Genitourinary:     Comments: Pelvic exam shows no signs of bleeding both externally and internally.  Musculoskeletal:         General: No deformity. Normal range of motion.      Cervical back: Normal range of motion and neck supple.   Lymphadenopathy:      Cervical: No cervical adenopathy.   Skin:     General: Skin is warm and dry.      Findings: No rash.   Neurological:      Mental Status: She is alert and oriented to person, place, and time.      Cranial Nerves: No cranial nerve deficit.      Sensory: No sensory deficit.   Psychiatric:         Speech: Speech normal.         Behavior: Behavior normal.         Thought Content: Thought content normal.         Judgment: Judgment normal.         Procedures           ED Course  ED Course as of 07/20/25 1247   Thu Jul 17, 2025 2123 Admit Rm 21: CHF exacerbation, Hypoxia    This patient presents with a complaint of lower extremity edema and increased was of breath last week. Chest x-ray shows pulmonary vascular congestion. Oxygen saturations were observed to be low upon initial arrival. The patient wears 4 L of nasal cannula oxygen at baseline. Now on roughly 5 L her oxygen saturation is stable. I have ordered 80 mg IV Lasix. Her blood pressure is in the upper 90s to low 100s systolic. I feel she needs observation while being diuresed.    [NS]      ED Course User Index  [NS] Bala Hernandez MD                                                       Medical Decision Making  Differential includes vaginal bleeding, rectal bleeding, hematuria, CHF exacerbation, COPD exacerbation, pneumonia, viral illness, of unspecified  etiology.    In regards to the shortness of breath and hypoxia I feel the hypoxia was a product of not having her oxygen on and CHF exacerbation.  She does not have any wheezing on auscultation of lungs.  She does have some crackles at the bilateral lung bases.  She also has peripheral edema.    Chest x-ray interpreted by me shows pulmonary vascular congestion.    In regards to the bleeding, a pelvic exam was performed and there is no vaginal bleeding.  Urine obtained by catheterization did not show gross blood.    Rectal exam did show some excoriation of the rectal tissue which I think is the source of the bleeding with bowel movements and there probably is an underlying anal fissure.  The stool itself did not appear to have blood on it.    CT scan abdomen pelvis without contrast interpreted by me shows diffuse body wall edema and anasarca with signs of fluid overload with trace right pleural effusion.    I have initiated potassium due to hypokalemia.   I have also initiated 80 mg of IV Lasix.    I discussed the patient with the hospitalist who will consult for admission.    Problems Addressed:  Acute hypokalemia: complicated acute illness or injury with systemic symptoms  Acute on chronic congestive heart failure, unspecified heart failure type: complicated acute illness or injury with systemic symptoms that poses a threat to life or bodily functions  Anal bleeding: complicated acute illness or injury with systemic symptoms  Hypoxia: complicated acute illness or injury with systemic symptoms  Peripheral edema: complicated acute illness or injury with systemic symptoms  Pulmonary congestion: complicated acute illness or injury with systemic symptoms that poses a threat to life or bodily functions    Amount and/or Complexity of Data Reviewed  External Data Reviewed: labs, radiology and ECG.  Labs: ordered. Decision-making details documented in ED Course.  Radiology: ordered and independent interpretation performed.  Decision-making details documented in ED Course.  ECG/medicine tests: ordered and independent interpretation performed. Decision-making details documented in ED Course.    Risk  OTC drugs.  Prescription drug management.  Decision regarding hospitalization.        Final diagnoses:   Acute on chronic congestive heart failure, unspecified heart failure type   Pulmonary congestion   Peripheral edema   Hypoxia   Acute hypokalemia   Anal bleeding       ED Disposition  ED Disposition       ED Disposition   Decision to Admit    Condition   --    Comment   Level of Care: Telemetry [5]   Diagnosis: CHF exacerbation [236772]   Admitting Physician: CINDY MENDOSA [[id number]]   Certification: I Certify That Inpatient Hospital Services Are Medically Necessary For Greater Than 2 Midnights                 [place] [address]  [facility] [id number] [phone]             Medication List      No changes were made to your prescriptions during this visit.            Bala Hernandez MD  07/17/25 [id number]       Bala Hernandez MD  07/20/25 6659

## 2025-07-18 NOTE — CONSULTS
Cardiology Consult / H&P  Date: 07/17/2025     Reason for Consultation: Evaluate for possible congestive heart failure    Chief complaint transient [place] unresponsiveness and blood per rectum.    History of present illness: This is a 74-year-old female who was brought in by her family from [place] directly to the emergency department.  She lives with her daughter who takes care of her on a daily basis.  The patient suffers from end-stage COPD on home O2.  She has 100-pack-year history of smoking.  On day of admission while sitting on the couch she had become transiently unresponsive for several minutes and then came back to herself.  In addition the daughter had noted blood per rectum earlier.  The patient did not complain of chest pain chest discomfort palpitations or worsening of her baseline shortness of breath.  Reportedly on admission exam revealed rectal prolapse with no overt bleeding.  No further neurologic events were noted.  She has no orthopnea or PND.  EKG on admission revealed normal sinus rhythm with pulmonary disease pattern but no acute ST-T wave abnormalities.  proBNP was elevated compared to her baseline was over 2500.  Chest x-ray declined at this close normal-sized heart and possible pulmonary venous congestion but chronic interstitial changes could not be excluded.  I had reviewed both today's chest x-rays as well as chest x-rays from the last several years and noted minimal change.  In addition she had a CT scan of her chest last year attesting to severe COPD.  2D echogram was performed and read by [place] [facility].  I reviewed the echo findings online.  This shows normal normal LV systolic function normal LV diastolic function and normal left atrial pressures which basically exclude diastolic heart failure.  Labs were reviewed as well.  Minimally elevated and flat curve troponin is noted.    Family history-negative for premature coronary artery disease       Smoking history:  current  every day smoker       Review of systems   Review of Systems   Constitutional:  Positive for fatigue.   HENT: Negative.     Eyes: Negative.    Respiratory:  Positive for shortness of breath.    Gastrointestinal:  Negative for abdominal distention and abdominal pain.   Endocrine: Negative for cold intolerance and heat intolerance.   Skin: Negative.    Neurological:  Positive for weakness.   Psychiatric/Behavioral:  Negative for agitation, behavioral problems and confusion.    All other systems reviewed and are negative.       Past Medical History  Past Medical History:   Diagnosis Date    COPD (chronic obstructive pulmonary disease)    , History reviewed. No pertinent surgical history.,   Family History   Problem Relation Age of Onset    Stroke Mother     Stroke Father    , and Allergies:  Patient has no known allergies.  Advanced dementia.    Physical Exam  Vitals and nursing note reviewed. Exam conducted with a chaperone present.   Constitutional:       Comments: cachectic   HENT:      Mouth/Throat:      Mouth: Mucous membranes are moist.      Pharynx: Oropharynx is clear.   Neck:      Vascular: No carotid bruit.   Cardiovascular:      Rate and Rhythm: Normal rate and regular rhythm.      Pulses: Normal pulses.      Heart sounds: Normal heart sounds. No murmur heard.     No friction rub. No gallop.      Comments: No JVD  Pulmonary:      Effort: Pulmonary effort is normal.      Breath sounds: Normal breath sounds.      Comments: Markedly decreased breath sounds bilaterally with rhonchi.  Skin:     General: Skin is warm and dry.      Capillary Refill: Capillary refill takes more than 3 seconds.   Neurological:      General: No focal deficit present.      Mental Status: She is alert.   Psychiatric:         Mood and Affect: Mood normal.          Labs:  Results from last 7 days   Lab Units 07/17/25  2143 07/17/25 2016   PROBNP pg/mL  --  2,586.0*   HSTROP T ng/L 26* 27*       Sodium Sodium   Date Value Ref Range Status  "  07/18/2025 144 136 - 145 mmol/L Final   07/17/2025 140 136 - 145 mmol/L Final      Potassium Potassium   Date Value Ref Range Status   07/18/2025 3.1 (L) 3.5 - 5.2 mmol/L Final   07/17/2025 2.8 (L) 3.5 - 5.2 mmol/L Final     Comment:     Specimen hemolyzed.  Result may be falsely elevated.      Chloride Chloride   Date Value Ref Range Status   07/18/2025 96 (L) 98 - 107 mmol/L Final   07/17/2025 97 (L) 98 - 107 mmol/L Final      Bicarbonate No results found for: \"PLASMABICARB\"   BUN BUN   Date Value Ref Range Status   07/18/2025 5.8 (L) 8.0 - 23.0 mg/dL Final   07/17/2025 6.2 (L) 8.0 - 23.0 mg/dL Final      Creatinine Creatinine   Date Value Ref Range Status   07/18/2025 0.56 (L) 0.57 - 1.00 mg/dL Final   07/17/2025 0.66 0.57 - 1.00 mg/dL Final      Calcium Calcium   Date Value Ref Range Status   07/18/2025 8.0 (L) 8.6 - 10.5 mg/dL Final   07/17/2025 8.6 8.6 - 10.5 mg/dL Final      Glucose      No components found for: \"GLUCOSE.*\"   eGFR           Estimated Glomerular Filtration Rate: 94.7 mL/min/1.73m2 (A) (by CKD-EPI based on SCr of 0.56 mg/dL (L)).   Urinalysis       Brief Urine Lab Results  (Last result in the past 365 days)        Color   Clarity   Blood   Leuk Est   Nitrite   Protein   CREAT   Urine HCG        07/17/25 2220 Yellow   Clear   Trace   Small (1+)   Negative   Negative                      Assessment & Plan     1-Non cardiogenic pulmonary venous congestion-mild and stable.  - No evidence for cardiac decompensation with essentially normal LV systolic and diastolic function ruling out heart failure.    2-End-stage O2 dependent COPD  -Per primary service.    Transient neurologic deficit which could have been related to poor oxygenation    Rectal polyp prolapse SP rectal bleeding.  Stable.    Per primary service    Current smoker with 100-pack-year history of smoking  -Smoking cessation protocol.  I will sign off call if needed.      Thank you.      Jarrod Flor MD  07/18/25  18:14 EDT          "

## 2025-07-18 NOTE — PAYOR COMM NOTE
"Ref# [id number]     HERBERTH Burrell, RN  Utilization Review  Phone [phone]  Fax [phone]    [address] [address]         Elisabeth Burger (74 y.o. Female)       Date of Birth   1950    Social Security Number       Address   [address] [place] [address] [place] [facility] [address]    Home Phone   [phone]    MRN   [id number]       Buddhist   None    Marital Status                               Admission Date   7/17/2025    Admission Type   Emergency    Admitting Provider   Pat Edwards MD    Attending Provider   Pat Edwards MD    Department, Room/Bed   [facility] 5G, S557/1       Discharge Date       Discharge Disposition       Discharge Destination                                 Attending Provider: Pat Edwards MD    Allergies: No Known Allergies    Isolation: None   Infection: None   Code Status: CPR    Ht: 170.2 cm (67.01\")   Wt: 40.8 kg (89 lb 15.2 oz)    Admission Cmt: None   Principal Problem: CHF exacerbation [I50.9]                   Active Insurance as of 7/17/2025       Primary Coverage       Payor Plan Insurance Group Employer/Plan Group    ANTHEM MEDICARE REPLACEMENT ANTHEM MEDICARE ADVANTAGE HMO [id number]       Payor Plan Address Payor Plan Phone Number Payor Plan Fax Number Effective Dates    [address] [phone]  1/1/2025 - None Entered    [facility] [address]         Subscriber Name Subscriber Birth Date Member ID       ELISABETH BURGER FAY 1950 [id number]               Secondary Coverage       Payor Plan Insurance Group Employer/Plan Group    [place]        Payor Plan Address Payor Plan Phone Number Payor Plan Fax Number Effective Dates    [address] [phone]  6/19/2023 - None Entered    [facility] [address]         Subscriber Name Subscriber Birth Date Member ID       ELISABETH BURGER FAY 1950 [id number]                     Emergency Contacts        " (Rel.) Home Phone Work Phone Mobile Phone    CYNDI BURGER (Son) [phone] -- [phone]    EUNICE SY (Daughter) [phone] -- [phone]              [place]: [facility] [id number] Tax ID [id number]  Insurance Information                  [facility] MEDICARE REPLACEMENT/[facility] MEDICARE ADVANTAGE HMO Phone: [phone]    Subscriber: Elisabeth Burger Subscriber#: [id number]    Group#: [id number] Precert#: --    Authorization#: -- Effective Date: --        [place] Phone: [phone]    Subscriber: Elisabeth Burger Subscriber#: [id number]    Group#: -- Precert#: [id number]    Authorization#: [id number] Effective Date: --             History & Physical        Tona Ordaz PA-C at 25 2303       Attestation signed by Odalys Su MD at 25 0602      I have reviewed this documentation and agree.  The patient was seen independently by the APC.  I was available for any questions or concerns.                         [facility] Medicine Services  HISTORY AND PHYSICAL    Patient Name: Elisabeth Burger  : 1950  MRN: [id number]  Primary Care Physician: Provider, No Known  Date of admission: 2025    Subjective  Subjective     Chief Complaint:  Lower extremity edema    HPI:  Elisabeth Burger is a 74 y.o. female with a past medical history significant for COPD with chronic respiratory failure on 4L NC, known pulmonary nodule, former tobacco abuse, and malnutrition. Patient was also apparently prescribed Eliquis and Lasix since 2024 for unknown reason. Daughter at bedside states patient has been out of meds for several weeks. Comes in today with complaints of progressively worsening SOB and bilateral lower extremity edema going on for the past week. Daughter at bedside also reports rectal bleeding with bowel movements. Apparently there is an area of excoriation around anus. Now here for further evaluation and treatment.  Currently  denies fever, cough, congestion, syncope or chest pain. No abdominal pain or N/v/D. No headache or focal weakness/ paresthesias.         Review of Systems   Constitutional:  Positive for fatigue. Negative for chills.   HENT:  Negative for congestion and trouble swallowing.    Eyes:  Negative for discharge and itching.   Respiratory:  Positive for shortness of breath. Negative for cough.    Cardiovascular:  Positive for leg swelling. Negative for chest pain.   Gastrointestinal:  Negative for abdominal pain, diarrhea, nausea and vomiting.   Endocrine: Negative for cold intolerance and heat intolerance.   Genitourinary:  Negative for dysuria and flank pain.   Musculoskeletal:  Negative for gait problem and joint swelling.   Skin:  Negative for pallor and rash.   Allergic/Immunologic: Negative for immunocompromised state.   Neurological:  Positive for weakness. Negative for dizziness and headaches.   Hematological:  Negative for adenopathy.   Psychiatric/Behavioral:  Negative for agitation and confusion.             Personal History     Past Medical History:   Diagnosis Date    COPD (chronic obstructive pulmonary disease)            History reviewed. No pertinent surgical history.    Family History:  family history includes Stroke in her father and mother.     Social History:  reports that she quit smoking about 2 years ago. Her smoking use included cigarettes. She started smoking about 52 years ago. She has a 25 pack-year smoking history. She has been exposed to tobacco smoke. She has never used smokeless tobacco. She reports that she does not drink alcohol and does not use drugs.  Social History     Social History Narrative    Hasn't smoked since her hospitalization 6/2023       Medications:  albuterol, budesonide-formoterol, nicotine, and tiotropium bromide monohydrate    No Known Allergies    Objective  Objective     Vital Signs:   Temp:  [98.7 °F (37.1 °C)] 98.7 °F (37.1 °C)  Heart Rate:  [69-99] 69  Resp:  [26]  26  BP: ()/(53-68) 102/57    Physical Exam   Constitutional: Awake, alert  Eyes: PERRLA, sclerae anicteric, no conjunctival injection  HENT: NCAT, mucous membranes moist  Neck: Supple, no thyromegaly, no lymphadenopathy, trachea midline  Respiratory: Clear to auscultation bilaterally, nonlabored respirations   Cardiovascular: RRR, no murmurs, rubs, or gallops, palpable pedal pulses bilaterally  Gastrointestinal: Positive bowel sounds, soft, nontender, nondistended  Musculoskeletal: BLE edema no clubbing or cyanosis to extremities  Psychiatric: Appropriate affect, cooperative  Neurologic: Oriented x 3, strength symmetric in all extremities, Cranial Nerves grossly intact to confrontation, speech clear  Skin: No rashes      Result Review:  I have personally reviewed the results from the time of this admission to 7/17/2025 23:38 EDT and agree with these findings:  [x]  Laboratory list / accordion  []  Microbiology  [x]  Radiology  []  EKG/Telemetry   []  Cardiology/Vascular   []  Pathology  []  Old records  []  Other:  Most notable findings include: /57. 72% on RA. Potassium 2.8. imaging notes anasarca    LAB RESULTS:      Lab 07/17/25 2016   WBC 9.97   HEMOGLOBIN 13.1   HEMATOCRIT 43.5   PLATELETS 232   NEUTROS ABS 6.53   IMMATURE GRANS (ABS) 0.03   LYMPHS ABS 2.39   MONOS ABS 0.64   EOS ABS 0.31   MCV 94.0         Lab 07/17/25 2016   SODIUM 140   POTASSIUM 2.8*   CHLORIDE 97*   CO2 35.3*   ANION GAP 7.7   BUN 6.2*   CREATININE 0.66   EGFR 91.0   GLUCOSE 147*   CALCIUM 8.6   MAGNESIUM 1.9   HEMOGLOBIN A1C 5.50         Lab 07/17/25 2016   TOTAL PROTEIN 5.9*   ALBUMIN 3.5   GLOBULIN 2.4   ALT (SGPT) 33   AST (SGOT) 40*   BILIRUBIN 0.2   ALK PHOS 96         Lab 07/17/25  2143 07/17/25 2016   PROBNP  --  2,586.0*   HSTROP T 26* 27*                 Brief Urine Lab Results  (Last result in the past 365 days)        Color   Clarity   Blood   Leuk Est   Nitrite   Protein   CREAT   Urine HCG        07/17/25 2220  Yellow   Clear   Trace   Small (1+)   Negative   Negative                 Microbiology Results (last 10 days)       Procedure Component Value - Date/Time    COVID PRE-OP / PRE-PROCEDURE SCREENING ORDER (NO ISOLATION) - Swab, Nasopharynx [[id number]]  (Normal) Collected: 07/17/25 2135    Lab Status: Final result Specimen: Swab from Nasopharynx Updated: 07/17/25 2212    Narrative:      The following orders were created for panel order COVID PRE-OP / PRE-PROCEDURE SCREENING ORDER (NO ISOLATION) - Swab, Nasopharynx.  Procedure                               Abnormality         Status                     ---------                               -----------         ------                     COVID-19 and FLU A/B PCR...[[id number]]  Normal              Final result                 Please view results for these tests on the individual orders.    COVID-19 and FLU A/B PCR, 1 HR TAT - Swab, Nasopharynx [[id number]]  (Normal) Collected: 07/17/25 2135    Lab Status: Final result Specimen: Swab from Nasopharynx Updated: 07/17/25 2212     COVID19 Not Detected     Influenza A PCR Not Detected     Influenza B PCR Not Detected            CT Abdomen Pelvis Without Contrast  Result Date: 7/17/2025  CT ABDOMEN PELVIS WO CONTRAST Date of Exam: 7/17/2025 8:53 PM EDT Indication: Acute abdominal pain. Comparison: None available Technique: Axial CT images were obtained of the abdomen and pelvis without the administration of contrast. Reconstructed coronal and sagittal images were also obtained. Automated exposure control and iterative construction methods were used. Findings: The heart size is normal. There is no pericardial effusion. There is a trace right-sided pleural effusion. There is peripheral bronchiectasis and bronchial wall thickening within the left lower lobe and right middle lobe. The liver is normal in size. There is no focal liver lesion by noncontrast technique. The gallbladder is surgically absent. There is no intrahepatic or  extrahepatic biliary ductal dilatation. The spleen is normal in size. The adrenal glands and pancreas appear within normal limits for noncontrast technique. The kidneys are symmetric in size. There is no hydronephrosis or urolithiasis. The urinary bladder is fluid-filled without wall thickening. The stomach and duodenum are normal in caliber and configuration. There are no abnormally dilated loops of small bowel to suggest small bowel obstruction. There is a small bowel containing left inguinal hernia. There is pancolonic diverticulosis without clear diverticulitis. There is diffuse edema and small volume ascites throughout the abdomen and pelvis. There is diffuse body wall edema and anasarca. The aorta is normal in caliber without evidence of aneurysm formation. There is aorto biiliac atherosclerotic calcification. There is degenerative disc disease at L4-L5 and L5-S1. There is inferior endplate compression deformity of the L2 vertebral body.     Impression: Impression: 1.Diffuse body wall edema and anasarca with small volume ascites and trace right-sided pleural effusion. 2.Small bowel containing left inguinal hernia without evidence of obstruction. 3.Pancolonic diverticulosis without diverticulitis. 4.Chronic appearing inferior endplate compression deformity of the L2 vertebral body. Electronically Signed: Juwan Marion MD  7/17/2025 9:05 PM EDT  Workstation ID: [id number]    XR Chest 1 View  Result Date: 7/17/2025  XR CHEST 1 VW Date of Exam: 7/17/2025 7:47 PM EDT Indication: Shortness of breath Comparison: 6/20/2023. Findings: The heart size is normal. There is diffuse interstitial prominence which probably is due to pulmonary interstitial edema although this can be seen with an atypical infection. There is no pleural effusion or pneumothorax. There are chronic age-related changes involving the bony thorax and thoracic aorta.     Impression: Impression: Diffuse interstitial prominence which probably is due to  pulmonary interstitial edema although this can be seen with an atypical infection. Electronically Signed: Joe Rice MD  7/17/2025 8:24 PM EDT  Workstation ID: [id number]      Results for orders placed during the hospital encounter of 06/19/23    Adult Transthoracic Echo Complete W/ Cont if Necessary Per Protocol 06/23/2023 10:36 AM    Interpretation Summary    Left ventricular systolic function is normal. Calculated left ventricular EF = 56.6% Left ventricular ejection fraction appears to be 56 - 60%.    Left ventricular diastolic function is consistent with (grade I) impaired relaxation.    The right ventricular cavity is moderately dilated.    The right atrial cavity is mildly  dilated.    Estimated right ventricular systolic pressure from tricuspid regurgitation is normal (<35 mmHg).      Assessment & Plan  Assessment & Plan       CHF exacerbation    Rectal bleeding    COPD (? Severity)    Pulmonary nodule (7mm LLL incidental)    Chronic respiratory failure with hypoxia    Supplemental oxygen dependent    Severe malnutrition      75 yo female here with heart failure    CHF Exacerbation  - hemodynamically stable, bu BP labile. Holding further diuresis for now  - imaging notes anasarca with pulmonary edema and small ascites  - on lasix for unknown reason, out for several weeks  - TTE 6/2023 with EF 56 to 60%; right ventricular systolic pressure from tricuspid regurgitation is normal (<35 mmHg); Left ventricular diastolic function is consistent with (grade I) impaired relaxation.   - trop 27, then 26. Continue to trend cardiac biomarkers  - no EKG changes  - administered 80 mg lasix in ED  - strict I&O  - daily weights  - check TSH, magnesium  - initiate heart failure pathway  - consult to cardiology  - repeat echocardiogram  - close monitor on telemetry  - am labs    Hypokalemia  - replace as needed per protocol  - no EKG changes  - check magnesium    Chronic Respiratory Failure  COPD  - Known Pulmonary Nodule; 7 x  6 x 5 mm diameter medial left lower lobe pulmonary nodule seen on CT.   - followed by pulmonology outpatient  - former smoker  - on 4L NC at baseline. Stable.  - continue Symbicort + Spiriva   - as needed pulmonary toilet    Medication Non Compliance  - ? Was on Eliquis or unknown reason, daughter suspects 2/2 prior DVT  - consult pharmacy    Malnutrition BMI 14  Failure to thrive  - consult to nutrition  - PT/OT      DVT prophylaxis:  heparin    CODE STATUS:  full code  Code Status (Patient has no pulse and is not breathing): CPR (Attempt to Resuscitate)  Medical Interventions (Patient has pulse or is breathing): Full Support  Level Of Support Discussed With: Patient      Expected Discharge  TBD      This note has been completed as part of a split-shared workflow.     Signature: Electronically signed by Tona Ordaz PA-C, 07/17/25, 11:38 PM EDT                Electronically signed by Odalys Su MD at 07/18/25 0602          Emergency Department Notes        Bala Hernandez MD at 07/17/25 2231          Subjective   History of Present Illness  74-year-old female brought in by son for evaluation of reported vaginal bleeding and shortness of breath.  The patient at baseline wears 4 L nasal cannula oxygen secondary to underlying COPD.  There is a strong smell of smoke on the patient upon initial evaluation.  The patient reports that roughly 1 month ago she had a procedure where something was taken off her cervix, she describes as like a colposcopy.  She reports that she has had continued oozing of blood since that given time.  She also reports over the last week she has had increased edema to the bilateral ankles with associated increased shortness of breath.  She does not report a previous history of CHF.  Records indicate she is supposed to take diuretics, but she does not take them on a regular basis.  She denies fever or infectious symptoms.  No sick contacts.  She reports that each time she used  the bathroom she notices blood in the toilet.      Review of Systems   Constitutional:  Negative for chills, fatigue and fever.   HENT:  Negative for congestion, ear pain, postnasal drip, sinus pressure and sore throat.    Eyes:  Negative for pain, redness and visual disturbance.   Respiratory:  Positive for shortness of breath. Negative for cough and chest tightness.    Cardiovascular:  Positive for leg swelling. Negative for chest pain and palpitations.   Gastrointestinal:  Positive for blood in stool. Negative for abdominal pain, anal bleeding, diarrhea, nausea and vomiting.   Endocrine: Negative for polydipsia and polyuria.   Genitourinary:  Positive for vaginal bleeding. Negative for difficulty urinating, dysuria, frequency and urgency.   Musculoskeletal:  Negative for arthralgias, back pain and neck pain.   Skin:  Negative for pallor and rash.   Allergic/Immunologic: Negative for environmental allergies and immunocompromised state.   Neurological:  Negative for dizziness, weakness and headaches.   Hematological:  Negative for adenopathy.   Psychiatric/Behavioral:  Negative for confusion, self-injury and suicidal ideas. The patient is not nervous/anxious.    All other systems reviewed and are negative.      Past Medical History:   Diagnosis Date    COPD (chronic obstructive pulmonary disease)        No Known Allergies    No past surgical history on file.    Family History   Problem Relation Age of Onset    Stroke Mother     Stroke Father        Social History     Socioeconomic History    Marital status:    Tobacco Use    Smoking status: Former     Current packs/day: 0.00     Average packs/day: 0.5 packs/day for 50.0 years (25.0 ttl pk-yrs)     Types: Cigarettes     Start date:      Quit date:      Years since quittin.5     Passive exposure: Current    Smokeless tobacco: Never   Vaping Use    Vaping status: Never Used   Substance and Sexual Activity    Alcohol use: Never    Drug use: Never     Sexual activity: Defer           Objective   Physical Exam  Vitals and nursing note reviewed.   Constitutional:       General: She is not in acute distress.     Appearance: Normal appearance. She is well-developed. She is not toxic-appearing or diaphoretic.   HENT:      Head: Normocephalic and atraumatic.      Right Ear: External ear normal.      Left Ear: External ear normal.      Nose: Nose normal.   Eyes:      General: Lids are normal.      Pupils: Pupils are equal, round, and reactive to light.   Neck:      Trachea: No tracheal deviation.   Cardiovascular:      Rate and Rhythm: Normal rate and regular rhythm.      Pulses: No decreased pulses.      Heart sounds: Normal heart sounds. No murmur heard.     No friction rub. No gallop.   Pulmonary:      Effort: Pulmonary effort is normal. No respiratory distress.      Breath sounds: Normal breath sounds. No decreased breath sounds, wheezing, rhonchi or rales.   Abdominal:      General: Bowel sounds are normal.      Palpations: Abdomen is soft.      Tenderness: There is no abdominal tenderness. There is no guarding or rebound.      Comments: Rectal exam shows excoriation at the anus secondary to recent frequent bowel movements.  The stool itself does not appear to have blood present.    Occult stool test was positive due to blood from excoriated rectal tissue.    H&H is normal and stable.   Genitourinary:     Comments: Pelvic exam shows no signs of bleeding both externally and internally.  Musculoskeletal:         General: No deformity. Normal range of motion.      Cervical back: Normal range of motion and neck supple.   Lymphadenopathy:      Cervical: No cervical adenopathy.   Skin:     General: Skin is warm and dry.      Findings: No rash.   Neurological:      Mental Status: She is alert and oriented to person, place, and time.      Cranial Nerves: No cranial nerve deficit.      Sensory: No sensory deficit.   Psychiatric:         Speech: Speech normal.          Behavior: Behavior normal.         Thought Content: Thought content normal.         Judgment: Judgment normal.         Procedures          ED Course  ED Course as of 07/17/25 2240   Thu Jul 17, 2025 2123 Admit Rm 21: CHF exacerbation, Hypoxia    This patient presents with a complaint of lower extremity edema and increased was of breath last week. Chest x-ray shows pulmonary vascular congestion. Oxygen saturations were observed to be low upon initial arrival. The patient wears 4 L of nasal cannula oxygen at baseline. Now on roughly 5 L her oxygen saturation is stable. I have ordered 80 mg IV Lasix. Her blood pressure is in the upper 90s to low 100s systolic. I feel she needs observation while being diuresed.    [NS]      ED Course User Index  [NS] Bala Hernandez MD                                                       Medical Decision Making  Differential includes vaginal bleeding, rectal bleeding, hematuria, CHF exacerbation, COPD exacerbation, pneumonia, viral illness, of unspecified etiology.    In regards to the shortness of breath and hypoxia I feel the hypoxia was a product of not having her oxygen on and CHF exacerbation.  She does not have any wheezing on auscultation of lungs.  She does have some crackles at the bilateral lung bases.  She also has peripheral edema.    Chest x-ray interpreted by me shows pulmonary vascular congestion.    In regards to the bleeding, a pelvic exam was performed and there is no vaginal bleeding.  Urine obtained by catheterization did not show gross blood.    Rectal exam did show some excoriation of the rectal tissue which I think is the source of the bleeding with bowel movements and there probably is an underlying anal fissure.  The stool itself did not appear to have blood on it.    CT scan abdomen pelvis without contrast interpreted by me shows diffuse body wall edema and anasarca with signs of fluid overload with trace right pleural effusion.    I have initiated  potassium due to hypokalemia.   I have also initiated 80 mg of IV Lasix.    I discussed the patient with the hospitalist who will consult for admission.    Problems Addressed:  Acute hypokalemia: complicated acute illness or injury with systemic symptoms  Acute on chronic congestive heart failure, unspecified heart failure type: complicated acute illness or injury with systemic symptoms  Anal bleeding: complicated acute illness or injury  Hypoxia: complicated acute illness or injury with systemic symptoms  Peripheral edema: complicated acute illness or injury with systemic symptoms  Pulmonary congestion: complicated acute illness or injury with systemic symptoms that poses a threat to life or bodily functions    Amount and/or Complexity of Data Reviewed  External Data Reviewed: labs, radiology and ECG.  Labs: ordered. Decision-making details documented in ED Course.  Radiology: ordered and independent interpretation performed. Decision-making details documented in ED Course.  ECG/medicine tests: ordered and independent interpretation performed. Decision-making details documented in ED Course.    Risk  OTC drugs.  Prescription drug management.  Decision regarding hospitalization.        Final diagnoses:   Acute on chronic congestive heart failure, unspecified heart failure type   Pulmonary congestion   Peripheral edema   Hypoxia   Acute hypokalemia   Anal bleeding       ED Disposition  ED Disposition       ED Disposition   Decision to Admit    Condition   --    Comment   Level of Care: Telemetry [5]   Diagnosis: CHF exacerbation [264861]   Admitting Physician: CINDY MENDOSA [[id number]]   Certification: I Certify That Inpatient Hospital Services Are Medically Necessary For Greater Than 2 Midnights                 No follow-up provider specified.       Medication List      No changes were made to your prescriptions during this visit.            Bala Hernandez MD  07/17/25 9317      Electronically signed by  "Bala Hernandez MD at 07/17/25 2240       Karthikeyan Stratton, RN at 07/17/25 2153           Elisabeth Burger    Nursing Report ED to Floor:  Mental status: aox4  Ambulatory status: assist x 1  Oxygen Therapy:  5l nc  Cardiac Rhythm: SR  Admitted from: home  Safety Concerns:  none  Precautions: none  Social Issues: none  ED Room #:  21    ED Nurse Phone Extension - [phone] or may call 0932.      HPI:   Chief Complaint   Patient presents with    Shortness of Breath    Vaginal Bleeding       Past Medical History:  Past Medical History:   Diagnosis Date    COPD (chronic obstructive pulmonary disease)         Past Surgical History:  No past surgical history on file.     Admitting Doctor:   No admitting provider for patient encounter.    Consulting Provider(s):  Consults       No orders found from 6/18/2025 to 7/18/2025.             Admitting Diagnosis:   The primary encounter diagnosis was Acute on chronic congestive heart failure, unspecified heart failure type. Diagnoses of Pulmonary congestion, Peripheral edema, Hypoxia, and Acute hypokalemia were also pertinent to this visit.    Most Recent Vitals:   Vitals:    07/17/25 1943 07/17/25 2000 07/17/25 2100 07/17/25 2130   BP: 119/55 118/68 99/53 102/55   Pulse: 99  81 77   Resp: 26      Temp: 98.7 °F (37.1 °C)      SpO2: (!) 72%  100% 97%   Weight: 40.8 kg (90 lb)      Height: 170.2 cm (67\")          Active LDAs/IV Access:   Lines, Drains & Airways       Active LDAs       Name Placement date Placement time Site Days    Peripheral IV 07/17/25 2123 20 G Anterior;Right Forearm 07/17/25 2123  Forearm  less than 1                    Labs (abnormal labs have a star):   Labs Reviewed   COMPREHENSIVE METABOLIC PANEL - Abnormal; Notable for the following components:       Result Value    Glucose 147 (*)     BUN 6.2 (*)     Potassium 2.8 (*)     Chloride 97 (*)     CO2 35.3 (*)     Total Protein 5.9 (*)     AST (SGOT) 40 (*)     All other components within normal limits    " Narrative:     GFR Categories in Chronic Kidney Disease (CKD)              GFR Category          GFR (mL/min/1.73)    Interpretation  G1                    90 or greater        Normal or high (1)  G2                    60-89                Mild decrease (1)  G3a                   45-59                Mild to moderate decrease  G3b                   30-44                Moderate to severe decrease  G4                    15-29                Severe decrease  G5                    14 or less           Kidney failure    (1)In the absence of evidence of kidney disease, neither GFR category G1 or G2 fulfill the criteria for CKD.    eGFR calculation 2021 CKD-EPI creatinine equation, which does not include race as a factor   BNP (IN-HOUSE) - Abnormal; Notable for the following components:    proBNP 2,586.0 (*)     All other components within normal limits    Narrative:     This assay is used as an aid in the diagnosis of individuals suspected of having heart failure. It can be used as an aid in the diagnosis of acute decompensated heart failure (ADHF) in patients presenting with signs and symptoms of ADHF to the emergency department (ED). In addition, NT-proBNP of <300 pg/mL indicates ADHF is not likely.    Age Range Result Interpretation  NT-proBNP Concentration (pg/mL:      <50             Positive            >450                   Gray                 300-450                    Negative             <300    50-75           Positive            >900                  Gray                300-900                  Negative            <300      >75             Positive            >1800                  Gray                300-1800                  Negative            <300   TROPONIN - Abnormal; Notable for the following components:    HS Troponin T 27 (*)     All other components within normal limits    Narrative:     High Sensitive Troponin T Reference Range:  <14.0 ng/L- Negative Female for AMI  <22.0 ng/L- Negative Male for  AMI  >=14 - Abnormal Female indicating possible myocardial injury.  >=22 - Abnormal Male indicating possible myocardial injury.   Clinicians would have to utilize clinical acumen, EKG, Troponin, and serial changes to determine if it is an Acute Myocardial Infarction or myocardial injury due to an underlying chronic condition.        CBC WITH AUTO DIFFERENTIAL - Abnormal; Notable for the following components:    MCHC 30.1 (*)     All other components within normal limits   MAGNESIUM - Normal   COVID PRE-OP / PRE-PROCEDURE SCREENING ORDER (NO ISOLATION)    Narrative:     The following orders were created for panel order COVID PRE-OP / PRE-PROCEDURE SCREENING ORDER (NO ISOLATION) - Swab, Nasopharynx.  Procedure                               Abnormality         Status                     ---------                               -----------         ------                     COVID-19 and FLU A/B PCR...[[id number]]                      In process                   Please view results for these tests on the individual orders.   COVID-19 AND FLU A/B, NP SWAB IN TRANSPORT MEDIA 1 HR TAT   RAINBOW DRAW    Narrative:     The following orders were created for panel order [place] Draw.  Procedure                               Abnormality         Status                     ---------                               -----------         ------                     Green Top (Gel)[[id number]]                                  Final result               Lavender Top[[id number]]                                     Final result               Gold Top - SST[[id number]]                                   Final result               [place] Top[[id number]]                                         Final result               Light Blue Top[[id number]]                                   Final result                 Please view results for these tests on the individual orders.   URINALYSIS W/ CULTURE IF INDICATED   HIGH SENSITIVITIY TROPONIN T 1HR   CBC AND  DIFFERENTIAL    Narrative:     The following orders were created for panel order CBC & Differential.  Procedure                               Abnormality         Status                     ---------                               -----------         ------                     CBC Auto Differential[[id number]]        Abnormal            Final result                 Please view results for these tests on the individual orders.   GREEN TOP   LAVENDER TOP   GOLD TOP - SST   GRAY TOP   LIGHT BLUE TOP       Meds Given in ED:   Medications   sodium chloride 0.9 % flush 10 mL (has no administration in time range)   sodium chloride 0.9 % flush 10 mL (has no administration in time range)   sodium chloride 0.9 % bolus 1,000 mL (1,000 mL Intravenous New Bag 7/17/25 2128)   Potassium Replacement - Follow Nurse / BPA Driven Protocol (has no administration in time range)   furosemide (LASIX) injection 80 mg (80 mg Intravenous Given 7/17/25 2129)           Last NIH score:                                                          Dysphagia screening results:  Patient Factors Component (Dysphagia:Stroke or Rule-out)  Best Eye Response: 4-->(E4) spontaneous (07/17/25 2152)  Best Motor Response: 6-->(M6) obeys commands (07/17/25 2152)  Best Verbal Response: 5-->(V5) oriented (07/17/25 2152)  Karan Coma Scale Score: 15 (07/17/25 2152)     Karan Coma Scale:  No data recorded     CIWA:        Restraint Type:            Isolation Status:  No active isolations          Electronically signed by Karthikeyan Stratton RN at 07/17/25 0553       Oxygen Therapy (since admission)       Date/Time SpO2 Device (Oxygen Therapy) Flow (L/min) (Oxygen Therapy) Oxygen Concentration (%) ETCO2 (mmHg)    07/18/25 0840 95 humidified;nasal cannula 6 -- --    07/18/25 0800 -- humidified;nasal cannula 6 -- --    07/18/25 0400 96 -- -- -- --    07/18/25 0216 96 humidified;nasal cannula 6 -- --    07/18/25 0209 82 nasal cannula;humidified 4 -- --    07/18/25 0100  100 -- -- -- --    07/18/25 0000 -- humidified;nasal cannula 5 -- --    07/17/25 2351 96 -- -- -- --    07/17/25 2300 96 -- -- -- --    07/17/25 2230 96 -- -- -- --    07/17/25 2200 99 -- -- -- --    07/17/25 2130 97 -- -- -- --    07/17/25 2100 100 -- -- -- --    07/17/25 1943 72 -- -- -- --          Facility-Administered Medications as of 7/18/2025   Medication Dose Route Frequency Provider Last Rate Last Admin    acetaminophen (TYLENOL) tablet 650 mg  650 mg Oral Q4H PRN Tona Ordaz PA-C        Or    acetaminophen (TYLENOL) 160 MG/5ML oral solution 650 mg  650 mg Oral Q4H PRN Tona Ordaz PA-C        Or    acetaminophen (TYLENOL) suppository 650 mg  650 mg Rectal Q4H PRN Tona Ordaz PA-C        sennosides-docusate (PERICOLACE) 8.6-50 MG per tablet 2 tablet  2 tablet Oral BID PRN Tona Ordaz PA-C        And    polyethylene glycol (MIRALAX) packet 17 g  17 g Oral Daily PRN Tona Ordaz PA-C        And    bisacodyl (DULCOLAX) EC tablet 5 mg  5 mg Oral Daily PRN Tona Ordaz PA-C        And    bisacodyl (DULCOLAX) suppository 10 mg  10 mg Rectal Daily PRN Tona Ordaz PA-C        budesonide-formoterol (SYMBICORT) 160-4.5 MCG/ACT inhaler 2 puff  2 puff Inhalation BID - RT Tona Ordaz PA-C   2 puff at 07/18/25 0840    bumetanide (BUMEX) tablet 2 mg  2 mg Oral BID Diuretics Pat Edwards MD   2 mg at 07/18/25 0829    Or    bumetanide (BUMEX) injection 2 mg  2 mg Intravenous BID Diuretics Pat Edwards MD        [COMPLETED] furosemide (LASIX) injection 80 mg  80 mg Intravenous Once Bala Hernandez MD   80 mg at 07/17/25 2129    heparin (porcine) 5000 UNIT/ML injection 5,000 Units  5,000 Units Subcutaneous Q12H Tona Ordaz PA-C   5,000 Units at 07/18/25 0823    magnesium oxide (MAG-OX) tablet 400 mg  400 mg Oral BID Pat Edwards MD   400 mg at 07/18/25 0829    melatonin tablet 5 mg  5 mg Oral Nightly Tona Ordaz PA-C        nicotine (NICODERM  CQ) 21 MG/24HR patch 1 patch  1 patch Transdermal Q24H Pat Edwards MD   1 patch at 25 1243    nitroglycerin (NITROSTAT) SL tablet 0.4 mg  0.4 mg Sublingual Q5 Min PRN Tona Ordaz PA-LAURA        ondansetron (ZOFRAN) injection 4 mg  4 mg Intravenous Q6H PRN Tona Ordaz PA-LAURA        Pharmacy Consult - MTM   Not Applicable Daily Castillo Farias, [facility]        [COMPLETED] potassium chloride (KLOR-CON M20) CR tablet 40 mEq  40 mEq Oral Once Chaparrita Michelle PharmD   40 mEq at 25 0041    potassium chloride (KLOR-CON M20) CR tablet 40 mEq  40 mEq Oral Q4H Pat Edwards MD   40 mEq at 25 1001    [] potassium chloride 10 mEq in 100 mL IVPB  10 mEq Intravenous Q1H Bala Hernandez  mL/hr at 25 2302 10 mEq at 25 2302    Potassium Replacement - Follow Nurse / BPA Driven Protocol   Not Applicable PRN Bala Hernandez MD        [COMPLETED] sodium chloride 0.9 % bolus 1,000 mL  1,000 mL Intravenous Once Bala Hernandez MD   Stopped at 25 2200    sodium chloride 0.9 % flush 10 mL  10 mL Intravenous PRN Bala Hernandez MD        sodium chloride 0.9 % flush 10 mL  10 mL Intravenous PRN Bala Hernandez MD        sodium chloride 0.9 % flush 10 mL  10 mL Intravenous Q12H Tona Ordaz PA-C   10 mL at 25 0824    sodium chloride 0.9 % flush 10 mL  10 mL Intravenous PRN Tona Ordaz PA-C        sodium chloride 0.9 % infusion 40 mL  40 mL Intravenous PRN Tona Ordaz PA-C         Lab Results (all)       Procedure Component Value Units Date/Time    TSH [[id number]]  (Normal) Collected: 25 034    Specimen: Blood Updated: 25 0501     TSH 1.150 uIU/mL     Comprehensive Metabolic Panel [[id number]]  (Abnormal) Collected: 25 0340    Specimen: Blood Updated: 25 0501     Glucose 153 mg/dL      BUN 5.8 mg/dL      Creatinine 0.56 mg/dL      Sodium 144 mmol/L      Potassium 3.1 mmol/L      Chloride 96 mmol/L      CO2  38.8 mmol/L      Calcium 8.0 mg/dL      Total Protein 5.8 g/dL      Albumin 3.2 g/dL      ALT (SGPT) 26 U/L      AST (SGOT) 30 U/L      Alkaline Phosphatase 89 U/L      Total Bilirubin 0.2 mg/dL      Globulin 2.6 gm/dL      Comment: Calculated Result        A/G Ratio 1.2 g/dL      BUN/Creatinine Ratio 10.4     Anion Gap 9.2 mmol/L      eGFR 94.7 mL/min/1.73     Narrative:      GFR Categories in Chronic Kidney Disease (CKD)              GFR Category          GFR (mL/min/1.73)    Interpretation  G1                    90 or greater        Normal or high (1)  G2                    60-89                Mild decrease (1)  G3a                   45-59                Mild to moderate decrease  G3b                   30-44                Moderate to severe decrease  G4                    15-29                Severe decrease  G5                    14 or less           Kidney failure    (1)In the absence of evidence of kidney disease, neither GFR category G1 or G2 fulfill the criteria for CKD.    eGFR calculation 2021 CKD-EPI creatinine equation, which does not include race as a factor    CBC Auto Differential [[id number]]  (Abnormal) Collected: 07/18/25 0340    Specimen: Blood Updated: 07/18/25 0439     WBC 8.30 10*3/mm3      RBC 4.37 10*6/mm3      Hemoglobin 12.5 g/dL      Hematocrit 41.3 %      MCV 94.5 fL      MCH 28.6 pg      MCHC 30.3 g/dL      RDW 15.1 %      RDW-SD 52.1 fl      MPV 9.2 fL      Platelets 221 10*3/mm3      Neutrophil % 65.8 %      Lymphocyte % 19.4 %      Monocyte % 8.4 %      Eosinophil % 5.2 %      Basophil % 1.0 %      Immature Grans % 0.2 %      Neutrophils, Absolute 5.46 10*3/mm3      Lymphocytes, Absolute 1.61 10*3/mm3      Monocytes, Absolute 0.70 10*3/mm3      Eosinophils, Absolute 0.43 10*3/mm3      Basophils, Absolute 0.08 10*3/mm3      Immature Grans, Absolute 0.02 10*3/mm3      nRBC 0.0 /100 WBC     Hemoglobin A1c [[id number]]  (Normal) Collected: 07/17/25 2016    Specimen: Blood Updated:  07/17/25 2324     Hemoglobin A1C 5.50 %     Narrative:      Hemoglobin A1C Ranges:    Increased Risk for Diabetes  5.7% to 6.4%  Diabetes                     >= 6.5%  Diabetic Goal                < 7.0%    Urinalysis With Culture If Indicated - Urine, Catheter [[id number]]  (Abnormal) Collected: 07/17/25 2220    Specimen: Urine, Catheter Updated: 07/17/25 2241     Color, UA Yellow     Appearance, UA Clear     pH, UA 7.5     Specific Gravity, UA 1.007     Glucose, UA Negative     Ketones, UA Negative     Bilirubin, UA Negative     Blood, UA Trace     Protein, UA Negative     Leuk Esterase, UA Small (1+)     Nitrite, UA Negative     Urobilinogen, UA 0.2 E.U./dL    Narrative:      In absence of clinical symptoms, the presence of pyuria, bacteria, and/or nitrites on the urinalysis result does not correlate with infection.    Urinalysis, Microscopic Only - Urine, Catheter [[id number]] Collected: 07/17/25 2220    Specimen: Urine, Catheter Updated: 07/17/25 2241     RBC, UA 0-2 /HPF      WBC, UA 0-2 /HPF      Comment: Urine culture not indicated.        Bacteria, UA None Seen /HPF      Squamous Epithelial Cells, UA 0-2 /HPF      Hyaline Casts, UA None Seen /LPF      Methodology Automated Microscopy    COVID PRE-OP / PRE-PROCEDURE SCREENING ORDER (NO ISOLATION) - Swab, Nasopharynx [[id number]]  (Normal) Collected: 07/17/25 2135    Specimen: Swab from Nasopharynx Updated: 07/17/25 2212    Narrative:      The following orders were created for panel order COVID PRE-OP / PRE-PROCEDURE SCREENING ORDER (NO ISOLATION) - Swab, Nasopharynx.  Procedure                               Abnormality         Status                     ---------                               -----------         ------                     COVID-19 and FLU A/B PCR...[[id number]]  Normal              Final result                 Please view results for these tests on the individual orders.    COVID-19 and FLU A/B PCR, 1 HR TAT - Swab, Nasopharynx [[id number]]   (Normal) Collected: 07/17/25 2135    Specimen: Swab from Nasopharynx Updated: 07/17/25 2212     COVID19 Not Detected     Influenza A PCR Not Detected     Influenza B PCR Not Detected    High Sensitivity Troponin T 1Hr [[id number]]  (Abnormal) Collected: 07/17/25 2143    Specimen: Blood Updated: 07/17/25 2210     HS Troponin T 26 ng/L      Troponin T Numeric Delta -1 ng/L      Troponin T % Delta -4    Narrative:      High Sensitive Troponin T Reference Range:  <14.0 ng/L- Negative Female for AMI  <22.0 ng/L- Negative Male for AMI  >=14 - Abnormal Female indicating possible myocardial injury.  >=22 - Abnormal Male indicating possible myocardial injury.   Clinicians would have to utilize clinical acumen, EKG, Troponin, and serial changes to determine if it is an Acute Myocardial Infarction or myocardial injury due to an underlying chronic condition.         Magnesium [[id number]]  (Normal) Collected: 07/17/25 2016    Specimen: Blood Updated: 07/17/25 2125     Magnesium 1.9 mg/dL     Comprehensive Metabolic Panel [[id number]]  (Abnormal) Collected: 07/17/25 2016    Specimen: Blood Updated: 07/17/25 2050     Glucose 147 mg/dL      BUN 6.2 mg/dL      Creatinine 0.66 mg/dL      Sodium 140 mmol/L      Potassium 2.8 mmol/L      Comment: Specimen hemolyzed.  Result may be falsely elevated.        Chloride 97 mmol/L      CO2 35.3 mmol/L      Calcium 8.6 mg/dL      Total Protein 5.9 g/dL      Albumin 3.5 g/dL      ALT (SGPT) 33 U/L      AST (SGOT) 40 U/L      Comment: Specimen hemolyzed.  Result may be falsely elevated.        Alkaline Phosphatase 96 U/L      Total Bilirubin 0.2 mg/dL      Globulin 2.4 gm/dL      Comment: Calculated Result        A/G Ratio 1.5 g/dL      BUN/Creatinine Ratio 9.4     Anion Gap 7.7 mmol/L      eGFR 91.0 mL/min/1.73     Narrative:      GFR Categories in Chronic Kidney Disease (CKD)              GFR Category          GFR (mL/min/1.73)    Interpretation  G1                    90 or greater        Normal  or high (1)  G2                    60-89                Mild decrease (1)  G3a                   45-59                Mild to moderate decrease  G3b                   30-44                Moderate to severe decrease  G4                    15-29                Severe decrease  G5                    14 or less           Kidney failure    (1)In the absence of evidence of kidney disease, neither GFR category G1 or G2 fulfill the criteria for CKD.    eGFR calculation 2021 CKD-EPI creatinine equation, which does not include race as a factor    BNP [[id number]]  (Abnormal) Collected: 07/17/25 2016    Specimen: Blood Updated: 07/17/25 2049     proBNP 2,586.0 pg/mL     Narrative:      This assay is used as an aid in the diagnosis of individuals suspected of having heart failure. It can be used as an aid in the diagnosis of acute decompensated heart failure (ADHF) in patients presenting with signs and symptoms of ADHF to the emergency department (ED). In addition, NT-proBNP of <300 pg/mL indicates ADHF is not likely.    Age Range Result Interpretation  NT-proBNP Concentration (pg/mL:      <50             Positive            >450                   Gray                 300-450                    Negative             <300    50-75           Positive            >900                  Gray                300-900                  Negative            <300      >75             Positive            >1800                  Gray                300-1800                  Negative            <300    High Sensitivity Troponin T [[id number]]  (Abnormal) Collected: 07/17/25 2016    Specimen: Blood Updated: 07/17/25 2047     HS Troponin T 27 ng/L     Narrative:      High Sensitive Troponin T Reference Range:  <14.0 ng/L- Negative Female for AMI  <22.0 ng/L- Negative Male for AMI  >=14 - Abnormal Female indicating possible myocardial injury.  >=22 - Abnormal Male indicating possible myocardial injury.   Clinicians would have to utilize clinical  acumen, EKG, Troponin, and serial changes to determine if it is an Acute Myocardial Infarction or myocardial injury due to an underlying chronic condition.         CBC & Differential [[id number]]  (Abnormal) Collected: 07/17/25 2016    Specimen: Blood Updated: 07/17/25 2034    Narrative:      The following orders were created for panel order CBC & Differential.  Procedure                               Abnormality         Status                     ---------                               -----------         ------                     CBC Auto Differential[[id number]]        Abnormal            Final result                 Please view results for these tests on the individual orders.    CBC Auto Differential [[id number]]  (Abnormal) Collected: 07/17/25 2016    Specimen: Blood Updated: 07/17/25 2034     WBC 9.97 10*3/mm3      RBC 4.63 10*6/mm3      Hemoglobin 13.1 g/dL      Hematocrit 43.5 %      MCV 94.0 fL      MCH 28.3 pg      MCHC 30.1 g/dL      RDW 15.2 %      RDW-SD 52.2 fl      MPV 9.3 fL      Platelets 232 10*3/mm3      Neutrophil % 65.5 %      Lymphocyte % 24.0 %      Monocyte % 6.4 %      Eosinophil % 3.1 %      Basophil % 0.7 %      Immature Grans % 0.3 %      Neutrophils, Absolute 6.53 10*3/mm3      Lymphocytes, Absolute 2.39 10*3/mm3      Monocytes, Absolute 0.64 10*3/mm3      Eosinophils, Absolute 0.31 10*3/mm3      Basophils, Absolute 0.07 10*3/mm3      Immature Grans, Absolute 0.03 10*3/mm3      nRBC 0.0 /100 WBC     [place] Draw [[id number]] Collected: 07/17/25 2016    Specimen: Blood Updated: 07/17/25 2030    Narrative:      The following orders were created for panel order [place] Draw.  Procedure                               Abnormality         Status                     ---------                               -----------         ------                     Green Top (Gel)[[id number]]                                  Final result               Lavender Top[[id number]]                                      Final result               Gold Top - SST[[id number]]                                   Final result               Lincoln Top[[id number]]                                         Final result               Light Blue Top[[id number]]                                   Final result                 Please view results for these tests on the individual orders.    Green Top (Gel) [[id number]] Collected: 07/17/25 2016    Specimen: Blood Updated: 07/17/25 2030     Extra Tube Hold for add-ons.     Comment: Auto resulted.       Lavender Top [[id number]] Collected: 07/17/25 2016    Specimen: Blood Updated: 07/17/25 2030     Extra Tube hold for add-on     Comment: Auto resulted       Gold Top - SST [[id number]] Collected: 07/17/25 2016    Specimen: Blood Updated: 07/17/25 2030     Extra Tube Hold for add-ons.     Comment: Auto resulted.       Lincoln Top [[id number]] Collected: 07/17/25 2016    Specimen: Blood Updated: 07/17/25 2030     Extra Tube Hold for add-ons.     Comment: Auto resulted.       Light Blue Top [[id number]] Collected: 07/17/25 2016    Specimen: Blood Updated: 07/17/25 2030     Extra Tube Hold for add-ons.     Comment: Auto resulted             Imaging Results (All)       Procedure Component Value Units Date/Time    CT Abdomen Pelvis Without Contrast [[id number]] Collected: 07/17/25 2056     Updated: 07/17/25 2108    Narrative:      CT ABDOMEN PELVIS WO CONTRAST    Date of Exam: 7/17/2025 8:53 PM EDT    Indication: Acute abdominal pain.    Comparison: None available    Technique: Axial CT images were obtained of the abdomen and pelvis without the administration of contrast. Reconstructed coronal and sagittal images were also obtained. Automated exposure control and iterative construction methods were used.      Findings:  The heart size is normal. There is no pericardial effusion. There is a trace right-sided pleural effusion. There is peripheral bronchiectasis and bronchial wall thickening within the left lower lobe and  right middle lobe.    The liver is normal in size. There is no focal liver lesion by noncontrast technique. The gallbladder is surgically absent. There is no intrahepatic or extrahepatic biliary ductal dilatation.    The spleen is normal in size. The adrenal glands and pancreas appear within normal limits for noncontrast technique.      The kidneys are symmetric in size. There is no hydronephrosis or urolithiasis. The urinary bladder is fluid-filled without wall thickening.    The stomach and duodenum are normal in caliber and configuration. There are no abnormally dilated loops of small bowel to suggest small bowel obstruction. There is a small bowel containing left inguinal hernia. There is pancolonic diverticulosis without   clear diverticulitis. There is diffuse edema and small volume ascites throughout the abdomen and pelvis. There is diffuse body wall edema and anasarca.    The aorta is normal in caliber without evidence of aneurysm formation. There is aorto biiliac atherosclerotic calcification. There is degenerative disc disease at L4-L5 and L5-S1. There is inferior endplate compression deformity of the L2 vertebral body.      Impression:      Impression:  1.Diffuse body wall edema and anasarca with small volume ascites and trace right-sided pleural effusion.  2.Small bowel containing left inguinal hernia without evidence of obstruction.  3.Pancolonic diverticulosis without diverticulitis.  4.Chronic appearing inferior endplate compression deformity of the L2 vertebral body.        Electronically Signed: Juwan Marion MD    7/17/2025 9:05 PM EDT    Workstation ID: [id number]    XR Chest 1 View [[id number]] Collected: 07/17/25 2023     Updated: 07/17/25 2027    Narrative:      XR CHEST 1 VW    Date of Exam: 7/17/2025 7:47 PM EDT    Indication: Shortness of breath    Comparison: 6/20/2023.    Findings:  The heart size is normal. There is diffuse interstitial prominence which probably is due to pulmonary  interstitial edema although this can be seen with an atypical infection. There is no pleural effusion or pneumothorax. There are chronic age-related   changes involving the bony thorax and thoracic aorta.      Impression:      Impression:  Diffuse interstitial prominence which probably is due to pulmonary interstitial edema although this can be seen with an atypical infection.        Electronically Signed: Joe Rice MD    7/17/2025 8:24 PM EDT    Workstation ID: [id number]          Physician Progress Notes (all)    No notes of this type exist for this encounter.       Consult Notes (all)    No notes of this type exist for this encounter.

## 2025-07-18 NOTE — PROGRESS NOTES
[facility] Medicine Services  PROGRESS NOTE    Patient Name: Elisabeth Burger  : 1950  MRN: [id number]    Date of Admission: 2025  Primary Care Physician: Provider, No Known    Subjective   Subjective     CC:  Lower extremity edema     HPI:  Seen at bedside.  No acute events overnight.  Has no complaints, satting on 95% on 6 liters      Objective   Objective     Vital Signs:   Temp:  [98 °F (36.7 °C)-98.7 °F (37.1 °C)] 98 °F (36.7 °C)  Heart Rate:  [69-99] 82  Resp:  [16-26] 18  BP: ()/(53-73) 111/66  Flow (L/min) (Oxygen Therapy):  [4-6] 6     Physical Exam:  Constitutional: No acute distress, awake, alert  HENT: NCAT, mucous membranes moist  Respiratory: reduced breath sounds globally  Cardiovascular: RRR, no murmurs, rubs, or gallops  Gastrointestinal: Positive bowel sounds, soft, nontender, nondistended  Musculoskeletal: No bilateral ankle edema  Psychiatric: Appropriate affect, cooperative  Neurologic: Oriented x 3    Results Reviewed:  LAB RESULTS:      Lab 253 25   WBC 8.30  --  9.97   HEMOGLOBIN 12.5  --  13.1   HEMATOCRIT 41.3  --  43.5   PLATELETS 221  --  232   NEUTROS ABS 5.46  --  6.53   IMMATURE GRANS (ABS) 0.02  --  0.03   LYMPHS ABS 1.61  --  2.39   MONOS ABS 0.70  --  0.64   EOS ABS 0.43*  --  0.31   MCV 94.5  --  94.0   HSTROP T  --  26* 27*         Lab 25  03425   SODIUM 144 140   POTASSIUM 3.1* 2.8*   CHLORIDE 96* 97*   CO2 38.8* 35.3*   ANION GAP 9.2 7.7   BUN 5.8* 6.2*   CREATININE 0.56* 0.66   EGFR 94.7 91.0   GLUCOSE 153* 147*   CALCIUM 8.0* 8.6   MAGNESIUM  --  1.9   HEMOGLOBIN A1C  --  5.50   TSH 1.150  --          Lab 25  03425   TOTAL PROTEIN 5.8* 5.9*   ALBUMIN 3.2* 3.5   GLOBULIN 2.6 2.4   ALT (SGPT) 26 33   AST (SGOT) 30 40*   BILIRUBIN 0.2 0.2   ALK PHOS 89 96         Lab 25  2143 25   PROBNP  --  2,586.0*   HSTROP T 26* 27*                  Brief Urine Lab Results  (Last result in the past 365 days)        Color   Clarity   Blood   Leuk Est   Nitrite   Protein   CREAT   Urine HCG        07/17/25 2220 Yellow   Clear   Trace   Small (1+)   Negative   Negative                   Microbiology Results Abnormal       None            CT Abdomen Pelvis Without Contrast  Result Date: 7/17/2025  CT ABDOMEN PELVIS WO CONTRAST Date of Exam: 7/17/2025 8:53 PM EDT Indication: Acute abdominal pain. Comparison: None available Technique: Axial CT images were obtained of the abdomen and pelvis without the administration of contrast. Reconstructed coronal and sagittal images were also obtained. Automated exposure control and iterative construction methods were used. Findings: The heart size is normal. There is no pericardial effusion. There is a trace right-sided pleural effusion. There is peripheral bronchiectasis and bronchial wall thickening within the left lower lobe and right middle lobe. The liver is normal in size. There is no focal liver lesion by noncontrast technique. The gallbladder is surgically absent. There is no intrahepatic or extrahepatic biliary ductal dilatation. The spleen is normal in size. The adrenal glands and pancreas appear within normal limits for noncontrast technique. The kidneys are symmetric in size. There is no hydronephrosis or urolithiasis. The urinary bladder is fluid-filled without wall thickening. The stomach and duodenum are normal in caliber and configuration. There are no abnormally dilated loops of small bowel to suggest small bowel obstruction. There is a small bowel containing left inguinal hernia. There is pancolonic diverticulosis without clear diverticulitis. There is diffuse edema and small volume ascites throughout the abdomen and pelvis. There is diffuse body wall edema and anasarca. The aorta is normal in caliber without evidence of aneurysm formation. There is aorto biiliac atherosclerotic calcification. There is  degenerative disc disease at L4-L5 and L5-S1. There is inferior endplate compression deformity of the L2 vertebral body.     Impression: Impression: 1.Diffuse body wall edema and anasarca with small volume ascites and trace right-sided pleural effusion. 2.Small bowel containing left inguinal hernia without evidence of obstruction. 3.Pancolonic diverticulosis without diverticulitis. 4.Chronic appearing inferior endplate compression deformity of the L2 vertebral body. Electronically Signed: Juwan Marion MD  7/17/2025 9:05 PM EDT  Workstation ID: [id number]    XR Chest 1 View  Result Date: 7/17/2025  XR CHEST 1 VW Date of Exam: 7/17/2025 7:47 PM EDT Indication: Shortness of breath Comparison: 6/20/2023. Findings: The heart size is normal. There is diffuse interstitial prominence which probably is due to pulmonary interstitial edema although this can be seen with an atypical infection. There is no pleural effusion or pneumothorax. There are chronic age-related changes involving the bony thorax and thoracic aorta.     Impression: Impression: Diffuse interstitial prominence which probably is due to pulmonary interstitial edema although this can be seen with an atypical infection. Electronically Signed: Joe Rice MD  7/17/2025 8:24 PM EDT  Workstation ID: [id number]      Results for orders placed during the hospital encounter of 07/17/25    Adult Transthoracic Echo Complete With Contrast if Necessary Per Protocol 07/18/2025 11:02 AM    Interpretation Summary    Left ventricular systolic function is normal. Calculated left ventricular EF = 65%    Left ventricular diastolic function was normal.    The right ventricular cavity is moderately dilated.    The right atrial cavity is mildly dilated.      Current medications:  Scheduled Meds:budesonide-formoterol, 2 puff, Inhalation, BID - RT  bumetanide, 2 mg, Oral, BID Diuretics   Or  bumetanide, 2 mg, Intravenous, BID Diuretics  heparin (porcine), 5,000 Units, Subcutaneous,  Q12H  magnesium oxide, 400 mg, Oral, BID  melatonin, 5 mg, Oral, Nightly  nicotine, 1 patch, Transdermal, Q24H  pharmacy consult - MTM, , Not Applicable, Daily  potassium chloride ER, 40 mEq, Oral, Q4H  sodium chloride, 10 mL, Intravenous, Q12H      Continuous Infusions:   PRN Meds:.  acetaminophen **OR** acetaminophen **OR** acetaminophen    senna-docusate sodium **AND** polyethylene glycol **AND** bisacodyl **AND** bisacodyl    nitroglycerin    ondansetron    Potassium Replacement - Follow Nurse / BPA Driven Protocol    sodium chloride    [COMPLETED] Insert Peripheral IV **AND** sodium chloride    sodium chloride    sodium chloride    Assessment & Plan   Assessment & Plan     Active Hospital Problems    Diagnosis  POA    **CHF exacerbation [I50.9]  Yes    Rectal bleeding [K62.5]  Yes    Supplemental oxygen dependent [Z99.81]  Not Applicable    Chronic respiratory failure with hypoxia [J96.11]  Yes    Pulmonary nodule (7mm LLL incidental) [R91.1]  Yes    COPD (? Severity) [J44.9]  Yes    Severe malnutrition [E43]  Yes      Resolved Hospital Problems   No resolved problems to display.        Brief Hospital Course to date:  Elisabeth Burger is a 74 y.o. female with a past medical history significant for COPD with chronic respiratory failure on 4L NC, known pulmonary nodule, former tobacco abuse, and malnutrition.   Admitted for below.    ? Acute CHF Exacerbation due to medication non compliance  ?Acute copd exacerbation  - imaging notes anasarca with pulmonary edema and small ascites  - TTE 6/2023 with EF 56 to 60%; right ventricular systolic pressure from tricuspid regurgitation is normal (<35 mmHg); Left ventricular diastolic function is consistent with (grade I) impaired relaxation.   - strict I&O  - daily weights  - Start IV Laxis  - repeat echo: EF of 65% with normal S,D dysfunction, right ventricular cavity is moderately dilated: echo essentially wnl, elevated proBNp is seen in older people especially females,  my suspicion for worsening hypoxia is likely copd exacerbation rather than heart failure vs generalized deconditioning from chronic illnesses.  - obtain RVP/treat with azithromycin/steroids/duonebs       Hypokalemia  - replete  - obtain magnesium     Chronic Respiratory Failure  Advanced COPD  - Known Pulmonary Nodule; 7 x 6 x 5 mm diameter medial left lower lobe pulmonary nodule seen on CT.   - on 4L NC at baseline.  - continue Symbicort + Spiriva     Tobacco abuse  - nicotine patch       Malnutrition BMI 14  Failure to thrive  - likely related to advanced copd  - consult to nutrition  - PT/OT        Expected Discharge Location and Transportation:   Expected Discharge   Expected discharge date/ time has not been documented.     VTE Prophylaxis:  Pharmacologic & mechanical VTE prophylaxis orders are present.         AM-PAC 6 Clicks Score (PT): 20 (07/18/25 1034)    CODE STATUS:   Code Status and Medical Interventions: CPR (Attempt to Resuscitate); Full Support   Ordered at: 07/17/25 9038     Code Status (Patient has no pulse and is not breathing):    CPR (Attempt to Resuscitate)     Medical Interventions (Patient has pulse or is breathing):    Full Support     Level Of Support Discussed With:    Patient       Pat Edwards MD  07/18/25

## 2025-07-18 NOTE — THERAPY EVALUATION
Patient Name: Elisabeth Burger  : 1950    MRN: [id number]                              Today's Date: 2025       Admit Date: 2025    Visit Dx:     ICD-10-CM ICD-9-CM   1. Acute on chronic congestive heart failure, unspecified heart failure type  I50.9 428.0   2. Pulmonary congestion  R09.89 514   3. Peripheral edema  R60.0 782.3   4. Hypoxia  R09.02 799.02   5. Acute hypokalemia  E87.6 276.8   6. Anal bleeding  K62.5 569.3     Patient Active Problem List   Diagnosis    Tobacco use    Severe malnutrition    COPD (? Severity)    Pulmonary nodule (7mm LLL incidental)    Chronic respiratory failure with hypoxia    Supplemental oxygen dependent    CHF exacerbation    Rectal bleeding     Past Medical History:   Diagnosis Date    COPD (chronic obstructive pulmonary disease)      History reviewed. No pertinent surgical history.   General Information       Row Name 25 1018          Physical Therapy Time and Intention    Document Type evaluation  -MB     Mode of Treatment physical therapy  -MB       Row Name 25 1018          General Information    Patient Profile Reviewed yes  Patient agreeable to PT initial evaluation.  -MB     Prior Level of Function independent:;all household mobility;community mobility;gait;transfer;bed mobility;ADL's;dependent:;driving  Patient's daughter assists with all IADLs. Per patient's daughter patient is able to ambulate .5 mile to the grocery store for cigarettes. Patient's daughter reports no falls, however, patient endorses occassional falls.  -MB     Existing Precautions/Restrictions fall;oxygen therapy device and L/min  -MB     Barriers to Rehab medically complex;previous functional deficit;cognitive status;hearing deficit  impaired health literacy  -MB       Row Name 25 1018          Living Environment    Current Living Arrangements home  -MB     People in Home child(fei), adult;grandchild(fei)  -MB       Row Name 25 1018          Home Main Entrance     Number of Stairs, Main Entrance five  4-5 steps to enter home  -MB     Stair Railings, Main Entrance railing on left side (ascending)  -MB       Row Name 07/18/25 1018          Stairs Within Home, Primary    Number of Stairs, Within Home, Primary none  -MB       Row Name 07/18/25 1018          Cognition    Orientation Status (Cognition) oriented to;person;place;disoriented to;time  -MB       Row Name 07/18/25 1018          Safety Issues/Impairments Affecting Functional Mobility    Safety Issues Affecting Function (Mobility) awareness of need for assistance;impulsivity;insight into deficits/self-awareness;judgment;positioning of assistive device;safety precaution awareness;safety precautions follow-through/compliance  -MB     Impairments Affecting Function (Mobility) balance;cognition;endurance/activity tolerance;postural/trunk control;shortness of breath;strength  -MB     Cognitive Impairments, Mobility Safety/Performance awareness, need for assistance;impulsivity;insight into deficits/self-awareness;judgment;safety precaution awareness;safety precaution follow-through  -MB               User Key  (r) = Recorded By, (t) = Taken By, (c) = Cosigned By      Initials Name Provider Type    Svetlana Velarde, PT Physical Therapist                   Mobility       Row Name 07/18/25 1022          Bed Mobility    Bed Mobility supine-sit  -MB     Supine-Sit [place] (Bed Mobility) standby assist  -MB     Assistive Device (Bed Mobility) head of bed elevated  -MB     Comment, (Bed Mobility) Patient impulsively moving to sitting at edge of bed without assist secondary to urinary urgency.  -MB       Row Name 07/18/25 1022          Bed-Chair Transfer    Bed-Chair [place] (Transfers) contact guard;verbal cues  -MB     Assistive Device (Bed-Chair Transfers) other (see comments)  no AD  -MB     Comment, (Bed-Chair Transfer) Patient impulsively transferring from bed to BSC prior to having patient/room properly setup for  transfer secondary to urinary urgency.  -MB       Row Name 07/18/25 1022          Sit-Stand Transfer    Sit-Stand [place] (Transfers) contact guard;verbal cues  -MB     Assistive Device (Sit-Stand Transfers) other (see comments)  no AD  -MB     Comment, (Sit-Stand Transfer) Patient moving to stand multiple times throughout session (to/from bed and BSC) with CGA. Patient provided RW at close of session with patient requiring verbal cues to keep RW with person when backing up sit in chair.  -MB       Row Name 07/18/25 1022          Gait/Stairs (Locomotion)    [place]ange Level (Gait) contact guard;minimum assist (75% patient effort);verbal cues  -MB     Assistive Device (Gait) other (see comments)  no AD initially, but provided RW group home between ambulation trial for improved stability  -MB     Patient was able to Ambulate yes  -MB     Distance in Feet (Gait) 150  + 150 feet with brief standing therapeutic rest between trials for adjustment of RW  -MB     Deviations/Abnormal Patterns (Gait) base of support, narrow;lorna decreased;gait speed decreased  -MB     Bilateral Gait Deviations forward flexed posture  and downward gaze  -MB     Right Sided Gait Deviations --  right ankle inversion  -MB     Comment, (Gait/Stairs) Patient initially ambulating 150 feet with no AD. Patient with mild unsteadiness with patient's balance further challenged with 180 degree turn, vertical and horizontal head turns, and starts/stops. Patient with posterior loss of balance requiring assist to correct when coming to a stop. Patient was provided a RW and ambulated remainder of ambulation with improved stability noted. Patient requirining verbal cues for proper positioning of RW during ambulation as well as verbal cues to keep RW with person when backing up to sit in chair. PT encouraged patient to utilize a RW upon return home for improved stability and in order to decrease risk of falling.  -MB               User Key  (r) =  Recorded By, (t) = Taken By, (c) = Cosigned By      Initials Name Provider Type    Svetlana Velarde, PT Physical Therapist                   Obj/Interventions       Row Name 07/18/25 1029          Range of Motion Comprehensive    General Range of Motion bilateral lower extremity ROM WFL  -MB     Comment, General Range of Motion Patient maintaining right ankle in inversion, but reports no history of injury.  -MB       Row Name 07/18/25 1029          Strength Comprehensive (MMT)    Comment, General Manual Muscle Testing (MMT) Assessment Bilateral LE strength grossly 4/5.  -MB       Row Name 07/18/25 1029          Balance    Balance Assessment sitting static balance;sitting dynamic balance;standing static balance;standing dynamic balance  -MB     Static Sitting Balance standby assist  -MB     Dynamic Sitting Balance standby assist  -MB     Position, Sitting Balance unsupported;sitting edge of bed  -MB     Static Standing Balance contact guard  -MB     Dynamic Standing Balance minimal assist  -MB     Position/Device Used, Standing Balance unsupported  -MB       Row Name 07/18/25 1029          Sensory Assessment (Somatosensory)    Sensory Assessment (Somatosensory) sensation intact  -MB               User Key  (r) = Recorded By, (t) = Taken By, (c) = Cosigned By      Initials Name Provider Type    Svetlana Velarde, PT Physical Therapist                   Goals/Plan       Row Name 07/18/25 1033          Bed Mobility Goal 1 (PT)    Activity/Assistive Device (Bed Mobility Goal 1, PT) bed mobility activities, all  -MB     [place] Level/Cues Needed (Bed Mobility Goal 1, PT) independent  -MB     Time Frame (Bed Mobility Goal 1, PT) short term goal (STG);1 week  -MB       Row Name 07/18/25 1033          Transfer Goal 1 (PT)    Activity/Assistive Device (Transfer Goal 1, PT) sit-to-stand/stand-to-sit;walker, rolling  -MB     [place] Level/Cues Needed (Transfer Goal 1, PT) modified independence  -MB     Time Frame  (Transfer Goal 1, PT) long term goal (LTG);2 weeks  -MB       Row Name 07/18/25 1033          Gait Training Goal 1 (PT)    Activity/Assistive Device (Gait Training Goal 1, PT) gait (walking locomotion);walker, rolling  -MB     [place] Level (Gait Training Goal 1, PT) modified independence  -MB     Distance (Gait Training Goal 1, PT) 300 feet  -MB     Time Frame (Gait Training Goal 1, PT) long term goal (LTG);2 weeks  -MB       Row Name 07/18/25 1033          Stairs Goal 1 (PT)    Activity/Assistive Device (Stairs Goal 1, PT) ascending stairs;descending stairs;using handrail, left  -MB     [place] Level/Cues Needed (Stairs Goal 1, PT) standby assist  -MB     Number of Stairs (Stairs Goal 1, PT) 5  -MB     Time Frame (Stairs Goal 1, PT) long term goal (LTG);2 weeks  -MB       Row Name 07/18/25 1033          Patient Education Goal (PT)    Activity (Patient Education Goal, PT) Patient will be independent with HEP and discharge recommendations for ease of transition to next level of care.  -MB     [place]/Cues/Accuracy (Memory Goal 2, PT) independent  -MB     Time Frame (Patient Education Goal, PT) long term goal (LTG);2 weeks  -MB       Row Name 07/18/25 1033          Therapy Assessment/Plan (PT)    Planned Therapy Interventions (PT) balance training;bed mobility training;gait training;home exercise program;neuromuscular re-education;patient/family education;postural re-education;stair training;strengthening;stretching;transfer training  -MB               User Key  (r) = Recorded By, (t) = Taken By, (c) = Cosigned By      Initials Name Provider Type    Svetlana Velarde, PT Physical Therapist                   Clinical Impression       Row Name 07/18/25 1030          Pain    Pretreatment Pain Rating 0/10 - no pain  -MB     Posttreatment Pain Rating 0/10 - no pain  -MB       Row Name 07/18/25 1030          Plan of Care Review    Plan of Care Reviewed With patient;child  -MB     Outcome Evaluation Patient  participated in PT initial evaluation. Patient presents with cognitive deficits, impaired balance, decreased strength, limited endurance, and poor insight into deficits as well as poor health literacy which impacts patient's overall safety, functional mobility, and independence. Patient would benefit from skilled PT services at this time to address noted deficits. PT recommending patient discharge home wiht 24 hr assist, home health PT/OT, and RW when medically appropriate for discharge.  -MB       Row Name 07/18/25 1030          Therapy Assessment/Plan (PT)    Patient/Family Therapy Goals Statement (PT) To return home.  -MB     Rehab Potential (PT) good  -MB     Criteria for Skilled Interventions Met (PT) yes;meets criteria;skilled treatment is necessary  -MB     Therapy Frequency (PT) daily  -MB     Predicted Duration of Therapy Intervention (PT) 2 weeks  -MB       Row Name 07/18/25 1030          Vital Signs    Pretreatment Heart Rate (beats/min) 70  -MB     Posttreatment Heart Rate (beats/min) 73  -MB     Pre SpO2 (%) 97  -MB     O2 Delivery Pre Treatment nasal cannula  6L  -MB     Post SpO2 (%) 96  -MB     O2 Delivery Post Treatment nasal cannula  6L  -MB     Pre Patient Position Supine  -MB     Post Patient Position Sitting  -MB       Row Name 07/18/25 1030          Positioning and Restraints    Pre-Treatment Position in bed  -MB     Post Treatment Position chair  -MB     In Chair notified nsg;reclined;sitting;call light within reach;encouraged to call for assist;exit alarm on;with family/caregiver;waffle cushion;legs elevated  -MB               User Key  (r) = Recorded By, (t) = Taken By, (c) = Cosigned By      Initials Name Provider Type    Svetlana Velarde, PT Physical Therapist                   Outcome Measures       Row Name 07/18/25 1034 07/18/25 0051       How much help from another person do you currently need...    Turning from your back to your side while in flat bed without using bedrails? 4  -MB 4   -CH    Moving from lying on back to sitting on the side of a flat bed without bedrails? 4  -MB 3  -CH    Moving to and from a bed to a chair (including a wheelchair)? 3  -MB 3  -CH    Standing up from a chair using your arms (e.g., wheelchair, bedside chair)? 3  -MB 3  -CH    Climbing 3-5 steps with a railing? 3  anticipated  -MB 3  -CH    To walk in hospital room? 3  -MB 3  -CH    AM-PAC 6 Clicks Score (PT) 20  -MB 19  -CH    Highest Level of Mobility Goal Walk 10 Steps or More-6  -MB Walk 10 Steps or More-6  -CH      Row Name 07/18/25 0000          How much help from another person do you currently need...    Turning from your back to your side while in flat bed without using bedrails? 4  -LW     Moving from lying on back to sitting on the side of a flat bed without bedrails? 3  -LW     Moving to and from a bed to a chair (including a wheelchair)? 3  -LW     Standing up from a chair using your arms (e.g., wheelchair, bedside chair)? 3  -LW     Climbing 3-5 steps with a railing? 3  -LW     To walk in hospital room? 3  -LW     AM-PAC 6 Clicks Score (PT) 19  -LW     Highest Level of Mobility Goal Walk 10 Steps or More-6  -LW       Row Name 07/18/25 1034          Functional Assessment    Outcome Measure Options AM-PAC 6 Clicks Basic Mobility (PT)  -MB               User Key  (r) = Recorded By, (t) = Taken By, (c) = Cosigned By      Initials Name Provider Type     Alley Irene, RN Registered Nurse    Svetlana Velarde, PT Physical Therapist    Timmy Dunbar, RN Registered Nurse                                 Physical Therapy Education       Title: PT OT SLP Therapies (In Progress)       Topic: Physical Therapy (In Progress)       Point: Mobility training (In Progress)       Learning Progress Summary            Patient Acceptance, E, NR by MB at 7/18/2025 1035    Acceptance, E,TB, VU by  at 7/18/2025 0121   Family Acceptance, E, NR by MB at 7/18/2025 1035    Acceptance, E,TB, VU by  at 7/18/2025 0121                       Point: Home exercise program (Done)       Learning Progress Summary            Patient Acceptance, E,TB, VU by  at 7/18/2025 0121   Family Acceptance, E,TB, VU by  at 7/18/2025 0121                      Point: Body mechanics (In Progress)       Learning Progress Summary            Patient Acceptance, E, NR by MB at 7/18/2025 1035    Acceptance, E,TB, VU by  at 7/18/2025 0121   Family Acceptance, E, NR by MB at 7/18/2025 1035    Acceptance, E,TB, VU by  at 7/18/2025 0121                      Point: Precautions (In Progress)       Learning Progress Summary            Patient Acceptance, E, NR by MB at 7/18/2025 1035    Acceptance, E,TB, VU by  at 7/18/2025 0121   Family Acceptance, E, NR by MB at 7/18/2025 1035    Acceptance, E,TB, VU by  at 7/18/2025 0121                                      User Key       Initials Effective Dates Name Provider Type Discipline    MB 05/30/25 -  Svetlana Ayala, PT Physical Therapist PT     06/12/25 -  Timmy Mayes, RN Registered Nurse Nurse                  PT Recommendation and Plan  Planned Therapy Interventions (PT): balance training, bed mobility training, gait training, home exercise program, neuromuscular re-education, patient/family education, postural re-education, stair training, strengthening, stretching, transfer training  Outcome Evaluation: Patient participated in PT initial evaluation. Patient presents with cognitive deficits, impaired balance, decreased strength, limited endurance, and poor insight into deficits as well as poor health literacy which impacts patient's overall safety, functional mobility, and independence. Patient would benefit from skilled PT services at this time to address noted deficits. PT recommending patient discharge home wiht 24 hr assist, home health PT/OT, and RW when medically appropriate for discharge.     Time Calculation:   PT Evaluation Complexity  History, PT Evaluation Complexity: 3 or more personal factors  and/or comorbidities  Examination of Body Systems (PT Eval Complexity): total of 3 or more elements  Clinical Presentation (PT Evaluation Complexity): evolving  Clinical Decision Making (PT Evaluation Complexity): moderate complexity  Overall Complexity (PT Evaluation Complexity): moderate complexity     PT Charges       Row Name 07/18/25 1036             Time Calculation    Start Time 0915  -MB      PT Received On 07/18/25  -MB      PT Goal Re-Cert Due Date 07/28/25  -MB         Untimed Charges    PT Eval/Re-eval Minutes 65  -MB         Total Minutes    Untimed Charges Total Minutes 65  -MB       Total Minutes 65  -MB                User Key  (r) = Recorded By, (t) = Taken By, (c) = Cosigned By      Initials Name Provider Type    Svetlana Velarde, PT Physical Therapist                  Therapy Charges for Today       Code Description Service Date Service Provider Modifiers Qty    [id number] HC-PT EVAL MOD COMPLEXITY 5 7/18/2025 Svetlana Ayala, PT  1            PT G-Codes  Outcome Measure Options: AM-PAC 6 Clicks Basic Mobility (PT)  AM-PAC 6 Clicks Score (PT): 20  PT Discharge Summary  Anticipated Discharge Disposition (PT): home with 24/7 care, home with home health    Svetlana Ayala, LORENA  7/18/2025

## 2025-07-18 NOTE — THERAPY EVALUATION
Patient Name: Elisabeth Burger  : 1950    MRN: [id number]                              Today's Date: 2025       Admit Date: 2025    Visit Dx:     ICD-10-CM ICD-9-CM   1. Acute on chronic congestive heart failure, unspecified heart failure type  I50.9 428.0   2. Pulmonary congestion  R09.89 514   3. Peripheral edema  R60.0 782.3   4. Hypoxia  R09.02 799.02   5. Acute hypokalemia  E87.6 276.8   6. Anal bleeding  K62.5 569.3     Patient Active Problem List   Diagnosis    Tobacco use    Severe malnutrition    COPD (? Severity)    Pulmonary nodule (7mm LLL incidental)    Chronic respiratory failure with hypoxia    Supplemental oxygen dependent    CHF exacerbation    Rectal bleeding     Past Medical History:   Diagnosis Date    COPD (chronic obstructive pulmonary disease)      History reviewed. No pertinent surgical history.   General Information       Row Name 25 1024          OT Time and Intention    Document Type evaluation  -AC     Mode of Treatment occupational therapy  -       Row Name 25 1024          General Information    Patient Profile Reviewed yes  -AC     Prior Level of Function independent:;all household mobility;ADL's;dependent:;driving  -AC     Existing Precautions/Restrictions fall;oxygen therapy device and L/min  -AC     Barriers to Rehab medically complex;previous functional deficit;cognitive status;hearing deficit  -       Row Name 25 1024          Occupational Profile    Environmental Supports and Barriers (Occupational Profile) tub shower with grab bar  -       Row Name 25 1024          Living Environment    Current Living Arrangements home  -AC     People in Home child(fei), adult;grandchild(fei)  dtr and 32 yo grandson  -       Row Name 25 1024          Home Main Entrance    Number of Stairs, Main Entrance five  -AC     Stair Railings, Main Entrance railing on left side (ascending)  -       Row Name 25 1024          Stairs Within Home,  Primary    Number of Stairs, Within Home, Primary none  -       Row Name 07/18/25 1024          Cognition    Orientation Status (Cognition) oriented to;person;place;disoriented to;time  -       Row Name 07/18/25 1024          Safety Issues/Impairments Affecting Functional Mobility    Safety Issues Affecting Function (Mobility) awareness of need for assistance;insight into deficits/self-awareness;impulsivity;judgment;safety precaution awareness;safety precautions follow-through/compliance  -     Impairments Affecting Function (Mobility) balance;cognition;endurance/activity tolerance;postural/trunk control;shortness of breath;strength  -     Cognitive Impairments, Mobility Safety/Performance awareness, need for assistance;insight into deficits/self-awareness;judgment;safety precaution awareness;safety precaution follow-through  -               User Key  (r) = Recorded By, (t) = Taken By, (c) = Cosigned By      Initials Name Provider Type     Penny Teresa OT Occupational Therapist                     Mobility/ADL's       Row Name 07/18/25 1026          Bed Mobility    Bed Mobility supine-sit  -     Supine-Sit [place] (Bed Mobility) standby assist  -[facility] Name 07/18/25 1026          Transfers    Transfers sit-stand transfer;toilet transfer  -[facility] Name 07/18/25 1026          Sit-Stand Transfer    Sit-Stand [place] (Transfers) contact guard  -[facility] Name 07/18/25 1026          Toilet Transfer    [place] Level (Toilet Transfer) verbal cues;contact guard  -     Assistive Device (Toilet Transfer) commode, bedside without drop arms  -[facility] Name 07/18/25 1026          Functional Mobility    Functional Mobility- Ind. Level verbal cues required;contact guard assist  -     Functional Mobility-Distance (Feet) --   distance  -     Functional Mobility- Comment VCs for upright posture  -       Row Name 07/18/25 1026          Activities of Daily Living    BADL  Assessment/Intervention lower body dressing  -AC       Row Name 07/18/25 1026          Lower Body Dressing Assessment/Training    [place] Level (Lower Body Dressing) don;doff;socks;pants/bottoms;minimum assist (75% patient effort)  -AC     Position (Lower Body Dressing) edge of bed sitting;unsupported standing  -AC               User Key  (r) = Recorded By, (t) = Taken By, (c) = Cosigned By      Initials Name Provider Type    Penny Ruiz, OT Occupational Therapist                   Obj/Interventions       Row Name 07/18/25 1103          Sensory Assessment (Somatosensory)    Sensory Assessment (Somatosensory) UE sensation intact  -AC       Row Name 07/18/25 1103          Vision Assessment/Intervention    Visual Impairment/Limitations WFL  -AC       Row Name 07/18/25 1103          Range of Motion Comprehensive    General Range of Motion bilateral upper extremity ROM WNL  -[facility] Name 07/18/25 1103          Strength Comprehensive (MMT)    Comment, General Manual Muscle Testing (MMT) Assessment BUE grossly 4-/5  -AC       Row Name 07/18/25 1103          Balance    Balance Assessment sitting static balance;sitting dynamic balance;standing static balance;standing dynamic balance  -AC     Static Sitting Balance standby assist  -AC     Dynamic Sitting Balance contact guard  -AC     Position, Sitting Balance unsupported;sitting edge of bed  -AC     Static Standing Balance contact guard  -AC     Dynamic Standing Balance contact guard  -AC     Position/Device Used, Standing Balance unsupported  -AC               User Key  (r) = Recorded By, (t) = Taken By, (c) = Cosigned By      Initials Name Provider Type    Penny Ruiz, OT Occupational Therapist                   Goals/Plan       Row Name 07/18/25 1111          Transfer Goal 1 (OT)    Activity/Assistive Device (Transfer Goal 1, OT) bed-to-chair/chair-to-bed;toilet  -AC     [place] Level/Cues Needed (Transfer Goal 1, OT) standby assist  -AC      Time Frame (Transfer Goal 1, OT) long term goal (LTG);10 days  -AC     Progress/Outcome (Transfer Goal 1, OT) new goal;goal ongoing  -AC       Row Name 07/18/25 1111          Dressing Goal 1 (OT)    Activity/Device (Dressing Goal 1, OT) lower body dressing  -AC     [place]/Cues Needed (Dressing Goal 1, OT) set-up required;supervision required  -AC     Time Frame (Dressing Goal 1, OT) short term goal (STG);5 days  -AC     Progress/Outcome (Dressing Goal 1, OT) new goal;goal ongoing  -AC       Row Name 07/18/25 1111          Toileting Goal 1 (OT)    Activity/Device (Toileting Goal 1, OT) perform perineal hygiene;adjust/manage clothing  -AC     [place] Level/Cues Needed (Toileting Goal 1, OT) standby assist  -AC     Time Frame (Toileting Goal 1, OT) short term goal (STG);5 days  -AC     Progress/Outcome (Toileting Goal 1, OT) new goal;goal ongoing  -AC       Row Name 07/18/25 1111          Therapy Assessment/Plan (OT)    Planned Therapy Interventions (OT) activity tolerance training;BADL retraining;functional balance retraining;occupation/activity based interventions;patient/caregiver education/training;strengthening exercise;transfer/mobility retraining  -               User Key  (r) = Recorded By, (t) = Taken By, (c) = Cosigned By      Initials Name Provider Type    Penny Ruiz, OT Occupational Therapist                   Clinical Impression       Row Name 07/18/25 1107          Pain Assessment    Pretreatment Pain Rating 0/10 - no pain  -     Posttreatment Pain Rating 0/10 - no pain  -       Row Name 07/18/25 1107          Plan of Care Review    Plan of Care Reviewed With patient  -AC     Outcome Evaluation Pt presents below baseline with ADLs d/t decr safety awareness, weakness, decr balance and activity tolerance.  Pt min A LBD,  CGA to ambulate without AD. OT will follow to advance pt toward PLOF.  Recommend home with 24/7 assist and HHOT upon d/c.  -       Row Name 07/18/25 1104           Therapy Assessment/Plan (OT)    Criteria for Skilled Therapeutic Interventions Met (OT) yes;skilled treatment is necessary  -AC     Therapy Frequency (OT) daily  -AC       Row Name 07/18/25 1107          Therapy Plan Review/Discharge Plan (OT)    Anticipated Discharge Disposition (OT) home with 24/7 care;home with home health  -AC       Row Name 07/18/25 1107          Vital Signs    Pre Systolic BP Rehab 108  -AC     Pre Treatment Diastolic BP 71  -AC     Pretreatment Heart Rate (beats/min) 71  -AC     Posttreatment Heart Rate (beats/min) 75  -AC     Pre SpO2 (%) 96  -AC     O2 Delivery Pre Treatment supplemental O2  -AC     Intra SpO2 (%) 90  -AC     O2 Delivery Intra Treatment supplemental O2  -AC     Post SpO2 (%) 96  -AC     O2 Delivery Post Treatment supplemental O2  -AC     Pre Patient Position Supine  -AC     Post Patient Position Sitting  -AC       Row Name 07/18/25 1107          Positioning and Restraints    Pre-Treatment Position in bed  -AC     Post Treatment Position chair  -AC     In Chair notified nsg;reclined;call light within reach;encouraged to call for assist;exit alarm on;waffle cushion;with family/caregiver  -               User Key  (r) = Recorded By, (t) = Taken By, (c) = Cosigned By      Initials Name Provider Type    AC Penny Teresa, OT Occupational Therapist                   Outcome Measures       Row Name 07/18/25 1112          How much help from another is currently needed...    Putting on and taking off regular lower body clothing? 3  -AC     Bathing (including washing, rinsing, and drying) 3  -AC     Toileting (which includes using toilet bed pan or urinal) 3  -AC     Putting on and taking off regular upper body clothing 3  -AC     Taking care of personal grooming (such as brushing teeth) 3  -AC     Eating meals 4  -AC     AM-PAC 6 Clicks Score (OT) 19  -AC       Row Name 07/18/25 1034 07/18/25 0051       How much help from another person do you currently need...    Turning from  your back to your side while in flat bed without using bedrails? 4  -MB 4  -CH    Moving from lying on back to sitting on the side of a flat bed without bedrails? 4  -MB 3  -CH    Moving to and from a bed to a chair (including a wheelchair)? 3  -MB 3  -CH    Standing up from a chair using your arms (e.g., wheelchair, bedside chair)? 3  -MB 3  -CH    Climbing 3-5 steps with a railing? 3  anticipated  -MB 3  -CH    To walk in hospital room? 3  -MB 3  -CH    AM-PAC 6 Clicks Score (PT) 20  -MB 19  -CH    Highest Level of Mobility Goal Walk 10 Steps or More-6  -MB Walk 10 Steps or More-6  -CH      Row Name 07/18/25 0000          How much help from another person do you currently need...    Turning from your back to your side while in flat bed without using bedrails? 4  -LW     Moving from lying on back to sitting on the side of a flat bed without bedrails? 3  -LW     Moving to and from a bed to a chair (including a wheelchair)? 3  -LW     Standing up from a chair using your arms (e.g., wheelchair, bedside chair)? 3  -LW     Climbing 3-5 steps with a railing? 3  -LW     To walk in hospital room? 3  -LW     AM-PAC 6 Clicks Score (PT) 19  -LW     Highest Level of Mobility Goal Walk 10 Steps or More-6  -LW       Row Name 07/18/25 1112 07/18/25 1034       Functional Assessment    Outcome Measure Options AM-PAC 6 Clicks Daily Activity (OT)  -AC AM-PAC 6 Clicks Basic Mobility (PT)  -MB              User Key  (r) = Recorded By, (t) = Taken By, (c) = Cosigned By      Initials Name Provider Type    Penny Ruiz OT Occupational Therapist    LW Alley Irene, RN Registered Nurse    Svetlana Velarde, PT Physical Therapist    CH Timmy Mayes, RN Registered Nurse                    Occupational Therapy Education       Title: PT OT SLP Therapies (In Progress)       Topic: Occupational Therapy (In Progress)       Point: ADL training (In Progress)       Learning Progress Summary            Patient Acceptance, E, NR by NAWAF at  7/18/2025 1113                                      User Key       Initials Effective Dates Name Provider Type Discipline    AC 02/03/23 -  Penny Teresa OT Occupational Therapist OT                  OT Recommendation and Plan  Planned Therapy Interventions (OT): activity tolerance training, BADL retraining, functional balance retraining, occupation/activity based interventions, patient/caregiver education/training, strengthening exercise, transfer/mobility retraining  Therapy Frequency (OT): daily  Plan of Care Review  Plan of Care Reviewed With: patient  Outcome Evaluation: Pt presents below baseline with ADLs d/t decr safety awareness, weakness, decr balance and activity tolerance.  Pt min A LBD,  CGA to ambulate without AD. OT will follow to advance pt toward PLOF.  Recommend home with 24/7 assist and HHOT upon d/c.     Time Calculation:   Evaluation Complexity (OT)  Review Occupational Profile/Medical/Therapy History Complexity: expanded/moderate complexity  Assessment, Occupational Performance/Identification of Deficit Complexity: 3-5 performance deficits  Clinical Decision Making Complexity (OT): detailed assessment/moderate complexity  Overall Complexity of Evaluation (OT): moderate complexity     Time Calculation- OT       Row Name 07/18/25 0918             Time Calculation- OT    OT Start Time 0918  -AC      OT Received On 07/18/25  -      OT Goal Re-Cert Due Date 07/28/25  -AC         Untimed Charges    OT Eval/Re-eval Minutes 62  -AC         Total Minutes    Untimed Charges Total Minutes 62  -AC       Total Minutes 62  -AC                User Key  (r) = Recorded By, (t) = Taken By, (c) = Cosigned By      Initials Name Provider Type    AC Penny Teresa, OT Occupational Therapist                  Therapy Charges for Today       Code Description Service Date Service Provider Modifiers Qty    [id number] HC-OT EVAL MOD COMPLEXITY 5 7/18/2025 Penny Teresa, OT  1                 Penny Teresa  OT  7/18/2025

## 2025-07-18 NOTE — PLAN OF CARE
Goal Outcome Evaluation:  Plan of Care Reviewed With: patient, child           Outcome Evaluation: Patient participated in PT initial evaluation. Patient presents with cognitive deficits, impaired balance, decreased strength, limited endurance, and poor insight into deficits as well as poor health literacy which impacts patient's overall safety, functional mobility, and independence. Patient would benefit from skilled PT services at this time to address noted deficits. PT recommending patient discharge home wiht 24 hr assist, home health PT/OT, and RW when medically appropriate for discharge.    Anticipated Discharge Disposition (PT): home with 24/7 care, home with home health

## 2025-07-19 ENCOUNTER — APPOINTMENT (OUTPATIENT)
Dept: CT IMAGING | Facility: HOSPITAL | Age: 75
End: 2025-07-19
Payer: MEDICARE

## 2025-07-19 PROBLEM — J44.1 COPD WITH ACUTE EXACERBATION: Status: ACTIVE | Noted: 2025-07-17

## 2025-07-19 LAB
ALBUMIN SERPL-MCNC: 3.5 G/DL (ref 3.5–5.2)
ALBUMIN/GLOB SERPL: 1.2 G/DL
ALP SERPL-CCNC: 91 U/L (ref 39–117)
ALT SERPL W P-5'-P-CCNC: 24 U/L (ref 1–33)
ANION GAP SERPL CALCULATED.3IONS-SCNC: 10.2 MMOL/L (ref 5–15)
ARTERIAL PATENCY WRIST A: POSITIVE
AST SERPL-CCNC: 45 U/L (ref 1–32)
ATMOSPHERIC PRESS: ABNORMAL MM[HG]
BASE EXCESS BLDA CALC-SCNC: 14.3 MMOL/L (ref 0–2)
BDY SITE: ABNORMAL
BILIRUB SERPL-MCNC: 0.2 MG/DL (ref 0–1.2)
BODY TEMPERATURE: 37
BUN SERPL-MCNC: 8.6 MG/DL (ref 8–23)
BUN/CREAT SERPL: 13.9 (ref 7–25)
CALCIUM SPEC-SCNC: 8.7 MG/DL (ref 8.6–10.5)
CHLORIDE SERPL-SCNC: 90 MMOL/L (ref 98–107)
CO2 BLDA-SCNC: 44.7 MMOL/L (ref 22–33)
CO2 SERPL-SCNC: 35.8 MMOL/L (ref 22–29)
COHGB MFR BLD: 1.6 % (ref 0–2)
CREAT SERPL-MCNC: 0.62 MG/DL (ref 0.57–1)
EGFRCR SERPLBLD CKD-EPI 2021: 92.4 ML/MIN/1.73
EPAP: 0
GLOBULIN UR ELPH-MCNC: 3 GM/DL
GLUCOSE BLDC GLUCOMTR-MCNC: 117 MG/DL (ref 70–130)
GLUCOSE BLDC GLUCOMTR-MCNC: 268 MG/DL (ref 70–130)
GLUCOSE SERPL-MCNC: 118 MG/DL (ref 65–99)
HCO3 BLDA-SCNC: 42.5 MMOL/L (ref 20–26)
HCT VFR BLD CALC: 39.7 % (ref 38–51)
HGB BLDA-MCNC: 13 G/DL (ref 14–18)
INHALED O2 CONCENTRATION: 36 %
IPAP: 0
Lab: ABNORMAL
MAGNESIUM SERPL-MCNC: 2.3 MG/DL (ref 1.6–2.4)
METHGB BLD QL: -0.1 % (ref 0–1.5)
MODALITY: ABNORMAL
NOTIFIED BY: ABNORMAL
NOTIFIED WHO: ABNORMAL
OXYHGB MFR BLDV: 92.1 % (ref 94–99)
PAW @ PEAK INSP FLOW SETTING VENT: 0 CMH2O
PCO2 BLDA: 70.5 MM HG (ref 35–45)
PCO2 TEMP ADJ BLD: 70.5 MM HG (ref 35–45)
PH BLDA: 7.39 PH UNITS (ref 7.35–7.45)
PH, TEMP CORRECTED: 7.39 PH UNITS
PHOSPHATE SERPL-MCNC: 2 MG/DL (ref 2.5–4.5)
PO2 BLDA: 65.3 MM HG (ref 83–108)
PO2 TEMP ADJ BLD: 65.3 MM HG (ref 83–108)
POTASSIUM SERPL-SCNC: 4.8 MMOL/L (ref 3.5–5.2)
PROT SERPL-MCNC: 6.5 G/DL (ref 6–8.5)
SODIUM SERPL-SCNC: 136 MMOL/L (ref 136–145)
TOTAL RATE: 0 BREATHS/MINUTE

## 2025-07-19 PROCEDURE — 94799 UNLISTED PULMONARY SVC/PX: CPT

## 2025-07-19 PROCEDURE — 25010000002 HEPARIN (PORCINE) PER 1000 UNITS: Performed by: PHYSICIAN ASSISTANT

## 2025-07-19 PROCEDURE — 82948 REAGENT STRIP/BLOOD GLUCOSE: CPT

## 2025-07-19 PROCEDURE — 25010000002 METHYLPREDNISOLONE PER 40 MG: Performed by: STUDENT IN AN ORGANIZED HEALTH CARE EDUCATION/TRAINING PROGRAM

## 2025-07-19 PROCEDURE — 94761 N-INVAS EAR/PLS OXIMETRY MLT: CPT

## 2025-07-19 PROCEDURE — 25010000002 CEFTRIAXONE PER 250 MG: Performed by: STUDENT IN AN ORGANIZED HEALTH CARE EDUCATION/TRAINING PROGRAM

## 2025-07-19 PROCEDURE — 87070 CULTURE OTHR SPECIMN AEROBIC: CPT | Performed by: STUDENT IN AN ORGANIZED HEALTH CARE EDUCATION/TRAINING PROGRAM

## 2025-07-19 PROCEDURE — 83735 ASSAY OF MAGNESIUM: CPT | Performed by: STUDENT IN AN ORGANIZED HEALTH CARE EDUCATION/TRAINING PROGRAM

## 2025-07-19 PROCEDURE — 25810000003 SODIUM CHLORIDE 0.9 % SOLUTION: Performed by: STUDENT IN AN ORGANIZED HEALTH CARE EDUCATION/TRAINING PROGRAM

## 2025-07-19 PROCEDURE — 94664 DEMO&/EVAL PT USE INHALER: CPT

## 2025-07-19 PROCEDURE — 71250 CT THORAX DX C-: CPT

## 2025-07-19 PROCEDURE — 36600 WITHDRAWAL OF ARTERIAL BLOOD: CPT

## 2025-07-19 PROCEDURE — 80053 COMPREHEN METABOLIC PANEL: CPT | Performed by: STUDENT IN AN ORGANIZED HEALTH CARE EDUCATION/TRAINING PROGRAM

## 2025-07-19 PROCEDURE — 83050 HGB METHEMOGLOBIN QUAN: CPT

## 2025-07-19 PROCEDURE — 82375 ASSAY CARBOXYHB QUANT: CPT

## 2025-07-19 PROCEDURE — 84100 ASSAY OF PHOSPHORUS: CPT | Performed by: STUDENT IN AN ORGANIZED HEALTH CARE EDUCATION/TRAINING PROGRAM

## 2025-07-19 PROCEDURE — 25010000002 ACETAZOLAMIDE PER 500 MG: Performed by: INTERNAL MEDICINE

## 2025-07-19 PROCEDURE — 87205 SMEAR GRAM STAIN: CPT | Performed by: STUDENT IN AN ORGANIZED HEALTH CARE EDUCATION/TRAINING PROGRAM

## 2025-07-19 PROCEDURE — 82805 BLOOD GASES W/O2 SATURATION: CPT

## 2025-07-19 PROCEDURE — 99232 SBSQ HOSP IP/OBS MODERATE 35: CPT | Performed by: STUDENT IN AN ORGANIZED HEALTH CARE EDUCATION/TRAINING PROGRAM

## 2025-07-19 RX ORDER — ACETAZOLAMIDE SODIUM 500 MG/5ML
250 INJECTION, POWDER, LYOPHILIZED, FOR SOLUTION INTRAVENOUS ONCE
Status: COMPLETED | OUTPATIENT
Start: 2025-07-19 | End: 2025-07-19

## 2025-07-19 RX ORDER — POLYETHYLENE GLYCOL 3350 17 G/17G
17 POWDER, FOR SOLUTION ORAL DAILY
Status: DISCONTINUED | OUTPATIENT
Start: 2025-07-19 | End: 2025-07-22 | Stop reason: HOSPADM

## 2025-07-19 RX ORDER — BISACODYL 5 MG/1
5 TABLET, DELAYED RELEASE ORAL DAILY PRN
Status: DISCONTINUED | OUTPATIENT
Start: 2025-07-19 | End: 2025-07-22 | Stop reason: HOSPADM

## 2025-07-19 RX ORDER — L.ACID,PARA/B.BIFIDUM/S.THERM 8B CELL
1 CAPSULE ORAL DAILY
Status: DISCONTINUED | OUTPATIENT
Start: 2025-07-19 | End: 2025-07-22 | Stop reason: HOSPADM

## 2025-07-19 RX ORDER — SODIUM CHLORIDE 9 MG/ML
50 INJECTION, SOLUTION INTRAVENOUS CONTINUOUS
Status: ACTIVE | OUTPATIENT
Start: 2025-07-19 | End: 2025-07-19

## 2025-07-19 RX ORDER — IPRATROPIUM BROMIDE AND ALBUTEROL SULFATE 2.5; .5 MG/3ML; MG/3ML
3 SOLUTION RESPIRATORY (INHALATION) EVERY 4 HOURS PRN
Status: DISCONTINUED | OUTPATIENT
Start: 2025-07-19 | End: 2025-07-22 | Stop reason: HOSPADM

## 2025-07-19 RX ORDER — BISACODYL 10 MG
10 SUPPOSITORY, RECTAL RECTAL DAILY PRN
Status: DISCONTINUED | OUTPATIENT
Start: 2025-07-19 | End: 2025-07-22 | Stop reason: HOSPADM

## 2025-07-19 RX ORDER — AMOXICILLIN 250 MG
2 CAPSULE ORAL 2 TIMES DAILY
Status: DISCONTINUED | OUTPATIENT
Start: 2025-07-19 | End: 2025-07-22 | Stop reason: HOSPADM

## 2025-07-19 RX ORDER — METHYLPREDNISOLONE SODIUM SUCCINATE 40 MG/ML
40 INJECTION, POWDER, LYOPHILIZED, FOR SOLUTION INTRAMUSCULAR; INTRAVENOUS DAILY
Status: DISCONTINUED | OUTPATIENT
Start: 2025-07-19 | End: 2025-07-22 | Stop reason: HOSPADM

## 2025-07-19 RX ADMIN — HEPARIN SODIUM 5000 UNITS: 5000 INJECTION INTRAVENOUS; SUBCUTANEOUS at 08:00

## 2025-07-19 RX ADMIN — AZITHROMYCIN DIHYDRATE 500 MG: 250 TABLET ORAL at 08:00

## 2025-07-19 RX ADMIN — MAGNESIUM OXIDE TAB 400 MG (240 MG ELEMENTAL MG) 400 MG: 400 (240 MG) TAB at 08:01

## 2025-07-19 RX ADMIN — SENNOSIDES, DOCUSATE SODIUM 2 TABLET: 50; 8.6 TABLET, FILM COATED ORAL at 21:38

## 2025-07-19 RX ADMIN — BUDESONIDE AND FORMOTEROL FUMARATE DIHYDRATE 2 PUFF: 160; 4.5 AEROSOL RESPIRATORY (INHALATION) at 08:34

## 2025-07-19 RX ADMIN — POLYETHYLENE GLYCOL 3350 17 G: 17 POWDER, FOR SOLUTION ORAL at 17:06

## 2025-07-19 RX ADMIN — Medication 10 ML: at 21:39

## 2025-07-19 RX ADMIN — MAGNESIUM OXIDE TAB 400 MG (240 MG ELEMENTAL MG) 400 MG: 400 (240 MG) TAB at 21:38

## 2025-07-19 RX ADMIN — Medication 100 MG: at 11:56

## 2025-07-19 RX ADMIN — METHYLPREDNISOLONE SODIUM SUCCINATE 40 MG: 40 INJECTION, POWDER, FOR SOLUTION INTRAMUSCULAR; INTRAVENOUS at 13:24

## 2025-07-19 RX ADMIN — Medication 10 ML: at 08:05

## 2025-07-19 RX ADMIN — NICOTINE 1 PATCH: 21 PATCH, EXTENDED RELEASE TRANSDERMAL at 08:05

## 2025-07-19 RX ADMIN — SODIUM CHLORIDE 50 ML/HR: 9 INJECTION, SOLUTION INTRAVENOUS at 17:06

## 2025-07-19 RX ADMIN — Medication 5 MG: at 22:15

## 2025-07-19 RX ADMIN — BUDESONIDE AND FORMOTEROL FUMARATE DIHYDRATE 2 PUFF: 160; 4.5 AEROSOL RESPIRATORY (INHALATION) at 19:12

## 2025-07-19 RX ADMIN — ACETAZOLAMIDE 250 MG: 500 INJECTION, POWDER, LYOPHILIZED, FOR SOLUTION INTRAVENOUS at 22:09

## 2025-07-19 RX ADMIN — APIXABAN 5 MG: 5 TABLET, FILM COATED ORAL at 21:38

## 2025-07-19 RX ADMIN — IPRATROPIUM BROMIDE AND ALBUTEROL SULFATE 3 ML: 2.5; .5 SOLUTION RESPIRATORY (INHALATION) at 08:34

## 2025-07-19 RX ADMIN — Medication 1 CAPSULE: at 13:25

## 2025-07-19 RX ADMIN — IPRATROPIUM BROMIDE AND ALBUTEROL SULFATE 3 ML: 2.5; .5 SOLUTION RESPIRATORY (INHALATION) at 12:06

## 2025-07-19 RX ADMIN — IPRATROPIUM BROMIDE AND ALBUTEROL SULFATE 3 ML: 2.5; .5 SOLUTION RESPIRATORY (INHALATION) at 17:08

## 2025-07-19 RX ADMIN — CEFTRIAXONE SODIUM 1000 MG: 1 INJECTION, POWDER, FOR SOLUTION INTRAMUSCULAR; INTRAVENOUS at 13:58

## 2025-07-19 NOTE — PROGRESS NOTES
[facility] Medicine Services  PROGRESS NOTE    Patient Name: Elisabeth Burger  : 1950  MRN: [id number]    Date of Admission: 2025  Primary Care Physician: Provider, No Known    Subjective   Subjective     CC:  Lower extremity edema     HPI:  Had 2 BM yesterday  Net negative 2.8L, but had 5 unmeasured voids  On 6L  Has frequent coughing, increased sputum production  No chest pain.   Shortness of breath improving after breathing treatment.  Her family reports severe tachypnea earlier this morning that improved with breathing treatment.  Patient's daughter and niece bedside and patient okay w them being updated    Objective   Objective     Vital Signs:   Temp:  [97.4 °F (36.3 °C)-99.7 °F (37.6 °C)] 97.4 °F (36.3 °C)  Heart Rate:  [65-87] 87  Resp:  [16-20] 20  BP: (105-115)/(52-63) 105/52  Flow (L/min) (Oxygen Therapy):  [5-6] 5     Physical Exam:  Constitutional: No acute distress, awake, alert frail, cachectic, chronically ill-appearing  HENT: NCAT, mucous membranes moist, edentulous  Respiratory: Diminished throughout with scattered expiratory wheezing, prolonged expiratory phase, respiratory effort normal   Cardiovascular: RRR  Gastrointestinal: Positive bowel sounds, soft, nontender, nondistended  Musculoskeletal: No bilateral ankle edema  Psychiatric: Appropriate affect, cooperative  Neurologic: Alert, oriented, symmetric facies, moves all extremities, speech clear  Skin: No rashes    Results Reviewed:  LAB RESULTS:      Lab 25  0340 25   WBC 8.30  --  9.97   HEMOGLOBIN 12.5  --  13.1   HEMATOCRIT 41.3  --  43.5   PLATELETS 221  --  232   NEUTROS ABS 5.46  --  6.53   IMMATURE GRANS (ABS) 0.02  --  0.03   LYMPHS ABS 1.61  --  2.39   MONOS ABS 0.70  --  0.64   EOS ABS 0.43*  --  0.31   MCV 94.5  --  94.0   HSTROP T  --  26* 27*         Lab 25  0841 25   SODIUM 136  --  144 140   POTASSIUM 4.8 4.2  3.1* 2.8*   CHLORIDE 90*  --  96* 97*   CO2 35.8*  --  38.8* 35.3*   ANION GAP 10.2  --  9.2 7.7   BUN 8.6  --  5.8* 6.2*   CREATININE 0.62  --  0.56* 0.66   EGFR 92.4  --  94.7 91.0   GLUCOSE 118*  --  153* 147*   CALCIUM 8.7  --  8.0* 8.6   MAGNESIUM 2.3  --   --  1.9   PHOSPHORUS 2.0*  --   --   --    HEMOGLOBIN A1C  --   --   --  5.50   TSH  --   --  1.150  --          Lab 07/19/25  0841 07/18/25  0340 07/17/25 2016   TOTAL PROTEIN 6.5 5.8* 5.9*   ALBUMIN 3.5 3.2* 3.5   GLOBULIN 3.0 2.6 2.4   ALT (SGPT) 24 26 33   AST (SGOT) 45* 30 40*   BILIRUBIN 0.2 0.2 0.2   ALK PHOS 91 89 96         Lab 07/17/25  2143 07/17/25 2016   PROBNP  --  2,586.0*   HSTROP T 26* 27*                 Brief Urine Lab Results  (Last result in the past 365 days)        Color   Clarity   Blood   Leuk Est   Nitrite   Protein   CREAT   Urine HCG        07/17/25 2220 Yellow   Clear   Trace   Small (1+)   Negative   Negative                   Microbiology Results Abnormal       None            CT Abdomen Pelvis Without Contrast  Result Date: 7/17/2025  CT ABDOMEN PELVIS WO CONTRAST Date of Exam: 7/17/2025 8:53 PM EDT Indication: Acute abdominal pain. Comparison: None available Technique: Axial CT images were obtained of the abdomen and pelvis without the administration of contrast. Reconstructed coronal and sagittal images were also obtained. Automated exposure control and iterative construction methods were used. Findings: The heart size is normal. There is no pericardial effusion. There is a trace right-sided pleural effusion. There is peripheral bronchiectasis and bronchial wall thickening within the left lower lobe and right middle lobe. The liver is normal in size. There is no focal liver lesion by noncontrast technique. The gallbladder is surgically absent. There is no intrahepatic or extrahepatic biliary ductal dilatation. The spleen is normal in size. The adrenal glands and pancreas appear within normal limits for noncontrast  technique. The kidneys are symmetric in size. There is no hydronephrosis or urolithiasis. The urinary bladder is fluid-filled without wall thickening. The stomach and duodenum are normal in caliber and configuration. There are no abnormally dilated loops of small bowel to suggest small bowel obstruction. There is a small bowel containing left inguinal hernia. There is pancolonic diverticulosis without clear diverticulitis. There is diffuse edema and small volume ascites throughout the abdomen and pelvis. There is diffuse body wall edema and anasarca. The aorta is normal in caliber without evidence of aneurysm formation. There is aorto biiliac atherosclerotic calcification. There is degenerative disc disease at L4-L5 and L5-S1. There is inferior endplate compression deformity of the L2 vertebral body.     Impression: Impression: 1.Diffuse body wall edema and anasarca with small volume ascites and trace right-sided pleural effusion. 2.Small bowel containing left inguinal hernia without evidence of obstruction. 3.Pancolonic diverticulosis without diverticulitis. 4.Chronic appearing inferior endplate compression deformity of the L2 vertebral body. Electronically Signed: Juwan Marion MD  7/17/2025 9:05 PM EDT  Workstation ID: [id number]    XR Chest 1 View  Result Date: 7/17/2025  XR CHEST 1 VW Date of Exam: 7/17/2025 7:47 PM EDT Indication: Shortness of breath Comparison: 6/20/2023. Findings: The heart size is normal. There is diffuse interstitial prominence which probably is due to pulmonary interstitial edema although this can be seen with an atypical infection. There is no pleural effusion or pneumothorax. There are chronic age-related changes involving the bony thorax and thoracic aorta.     Impression: Impression: Diffuse interstitial prominence which probably is due to pulmonary interstitial edema although this can be seen with an atypical infection. Electronically Signed: Joe Rice MD  7/17/2025 8:24 PM EDT   Workstation ID: [id number]      Results for orders placed during the hospital encounter of 07/17/25    Adult Transthoracic Echo Complete With Contrast if Necessary Per Protocol 07/18/2025 11:02 AM    Interpretation Summary    Left ventricular systolic function is normal. Calculated left ventricular EF = 65%    Left ventricular diastolic function was normal.    The right ventricular cavity is moderately dilated.    The right atrial cavity is mildly dilated.      Current medications:  Scheduled Meds:apixaban, 5 mg, Oral, Q12H  azithromycin, 500 mg, Oral, Q24H  budesonide-formoterol, 2 puff, Inhalation, BID - RT  cefTRIAXone, 1,000 mg, Intravenous, Q24H  ipratropium-albuterol, 3 mL, Nebulization, 4x Daily - RT  lactobacillus acidophilus, 1 capsule, Oral, Daily  magnesium oxide, 400 mg, Oral, BID  melatonin, 5 mg, Oral, Nightly  methylPREDNISolone sodium succinate, 40 mg, Intravenous, Daily  nicotine, 1 patch, Transdermal, Q24H  pharmacy consult - MTM, , Not Applicable, Daily  sodium chloride, 10 mL, Intravenous, Q12H  thiamine, 100 mg, Oral, Daily      Continuous Infusions:   PRN Meds:.  acetaminophen **OR** acetaminophen **OR** acetaminophen    senna-docusate sodium **AND** polyethylene glycol **AND** bisacodyl **AND** bisacodyl    ipratropium-albuterol    nitroglycerin    ondansetron    Potassium Replacement - Follow Nurse / BPA Driven Protocol    sodium chloride    [COMPLETED] Insert Peripheral IV **AND** sodium chloride    sodium chloride    sodium chloride    Assessment & Plan   Assessment & Plan     Active Hospital Problems    Diagnosis  POA    **COPD with acute exacerbation [J44.1]  Yes    Rectal bleeding [K62.5]  Yes    Supplemental oxygen dependent [Z99.81]  Not Applicable    Chronic respiratory failure with hypoxia [J96.11]  Yes    Pulmonary nodule (7mm LLL incidental) [R91.1]  Yes    COPD (? Severity) [J44.9]  Yes    Severe malnutrition [E43]  Yes      Resolved Hospital Problems   No resolved problems to  display.        Brief Hospital Course to date:  Elisabeth Burger is a 74 y.o. female with hx of COPD with chronic respiratory failure on 4L NC, known pulmonary nodule, former tobacco use disorder, HFpEF, who presented for evaluation of LE edema & shortness of breath. Patient had been out of medications for several weeks prior to presentation. CT abdomen/pelvis showed anasarca, left inguinal hernia, diverticulosis, chronic L2 compression fracture.    This patient's problems and plans were partially entered by my partner and updated as appropriate by me 07/19/25.    COPD exacerbation  Chronic respiratory failure requiring 4L at all times  Acute on chronic hypoxia in setting of above  Known pulmonary nodule, 7 x 6 x 5 mm diameter medial left lower lobe   -Continue scheduled duonebs; added on PRN duonebs  -Continue Symbicort  -Added on solumedrol  -Finish Azith; added on Ceftriaxone  -Sputum culture pending  -Changed goal sats to 88-92% given COPD  -Airway clearance    Hx of HFpEF  - imaging w anasarca with pulmonary edema and small ascites  - TTE 6/2023 with EF 56 to 60%; right ventricular systolic pressure from tricuspid regurgitation is normal (<35 mmHg); Left ventricular diastolic function is consistent with (grade I) impaired relaxation.   - daily weights  - repeat echo: EF of 65% with normal diastolic function  - stop diuretics today    Rectal bleeding, resolved  -Occurred prior to admission, has not recurred since admission    Hypokalemia  -replace per protocol    Tobacco use disorder  - nicotine patch     Severe Malnutrition BMI 14  Failure to thrive  - likely related to advanced copd; has chronic illness with severe muscle wasting and subcutaneous fat loss  - consulted nutrition  - PT[facility]OT rec home     [facility]     Family updated bedside today      Expected Discharge Location and Transportation: home w  and 24/7 care   Expected Discharge   Expected discharge date/ time has not been  documented.     VTE Prophylaxis:  Pharmacologic & mechanical VTE prophylaxis orders are present.         AM-PAC 6 Clicks Score (PT): 20 (07/19/25 1200)    CODE STATUS:   Code Status and Medical Interventions: CPR (Attempt to Resuscitate); Full Support   Ordered at: 07/17/25 2750     Code Status (Patient has no pulse and is not breathing):    CPR (Attempt to Resuscitate)     Medical Interventions (Patient has pulse or is breathing):    Full Support     Level Of Support Discussed With:    Patient       Janna Rausch MD  07/19/25

## 2025-07-20 ENCOUNTER — APPOINTMENT (OUTPATIENT)
Dept: GENERAL RADIOLOGY | Facility: HOSPITAL | Age: 75
End: 2025-07-20
Payer: MEDICARE

## 2025-07-20 ENCOUNTER — ANCILLARY PROCEDURE (OUTPATIENT)
Dept: SPEECH THERAPY | Facility: HOSPITAL | Age: 75
End: 2025-07-20
Payer: MEDICARE

## 2025-07-20 LAB
ALBUMIN SERPL-MCNC: 3.4 G/DL (ref 3.5–5.2)
ALBUMIN/GLOB SERPL: 1.1 G/DL
ALP SERPL-CCNC: 91 U/L (ref 39–117)
ALT SERPL W P-5'-P-CCNC: 34 U/L (ref 1–33)
ANION GAP SERPL CALCULATED.3IONS-SCNC: 6.4 MMOL/L (ref 5–15)
ANION GAP SERPL CALCULATED.3IONS-SCNC: 7.2 MMOL/L (ref 5–15)
AST SERPL-CCNC: 39 U/L (ref 1–32)
BASOPHILS # BLD AUTO: 0.04 10*3/MM3 (ref 0–0.2)
BASOPHILS NFR BLD AUTO: 0.3 % (ref 0–1.5)
BILIRUB SERPL-MCNC: 0.2 MG/DL (ref 0–1.2)
BUN SERPL-MCNC: 10.7 MG/DL (ref 8–23)
BUN SERPL-MCNC: 11 MG/DL (ref 8–23)
BUN/CREAT SERPL: 19.1 (ref 7–25)
BUN/CREAT SERPL: 19.3 (ref 7–25)
CALCIUM SPEC-SCNC: 8.6 MG/DL (ref 8.6–10.5)
CALCIUM SPEC-SCNC: 9.9 MG/DL (ref 8.6–10.5)
CHLORIDE SERPL-SCNC: 97 MMOL/L (ref 98–107)
CHLORIDE SERPL-SCNC: 97 MMOL/L (ref 98–107)
CO2 SERPL-SCNC: 29.8 MMOL/L (ref 22–29)
CO2 SERPL-SCNC: 31.6 MMOL/L (ref 22–29)
CREAT SERPL-MCNC: 0.56 MG/DL (ref 0.57–1)
CREAT SERPL-MCNC: 0.57 MG/DL (ref 0.57–1)
DEPRECATED RDW RBC AUTO: 52.2 FL (ref 37–54)
EGFRCR SERPLBLD CKD-EPI 2021: 94.3 ML/MIN/1.73
EGFRCR SERPLBLD CKD-EPI 2021: 94.7 ML/MIN/1.73
EOSINOPHIL # BLD AUTO: 0.02 10*3/MM3 (ref 0–0.4)
EOSINOPHIL NFR BLD AUTO: 0.1 % (ref 0.3–6.2)
ERYTHROCYTE [DISTWIDTH] IN BLOOD BY AUTOMATED COUNT: 14.6 % (ref 12.3–15.4)
GLOBULIN UR ELPH-MCNC: 3.1 GM/DL
GLUCOSE SERPL-MCNC: 63 MG/DL (ref 65–99)
GLUCOSE SERPL-MCNC: 94 MG/DL (ref 65–99)
HCT VFR BLD AUTO: 42 % (ref 34–46.6)
HGB BLD-MCNC: 12.5 G/DL (ref 12–15.9)
IMM GRANULOCYTES # BLD AUTO: 0.05 10*3/MM3 (ref 0–0.05)
IMM GRANULOCYTES NFR BLD AUTO: 0.4 % (ref 0–0.5)
LYMPHOCYTES # BLD AUTO: 1.72 10*3/MM3 (ref 0.7–3.1)
LYMPHOCYTES NFR BLD AUTO: 12.7 % (ref 19.6–45.3)
MCH RBC QN AUTO: 28.9 PG (ref 26.6–33)
MCHC RBC AUTO-ENTMCNC: 29.8 G/DL (ref 31.5–35.7)
MCV RBC AUTO: 97 FL (ref 79–97)
MONOCYTES # BLD AUTO: 0.92 10*3/MM3 (ref 0.1–0.9)
MONOCYTES NFR BLD AUTO: 6.8 % (ref 5–12)
NEUTROPHILS NFR BLD AUTO: 10.83 10*3/MM3 (ref 1.7–7)
NEUTROPHILS NFR BLD AUTO: 79.7 % (ref 42.7–76)
NRBC BLD AUTO-RTO: 0 /100 WBC (ref 0–0.2)
PLATELET # BLD AUTO: 241 10*3/MM3 (ref 140–450)
PMV BLD AUTO: 9.5 FL (ref 6–12)
POTASSIUM SERPL-SCNC: 5.6 MMOL/L (ref 3.5–5.2)
POTASSIUM SERPL-SCNC: 5.7 MMOL/L (ref 3.5–5.2)
POTASSIUM SERPL-SCNC: 6.1 MMOL/L (ref 3.5–5.2)
PROT SERPL-MCNC: 6.5 G/DL (ref 6–8.5)
QT INTERVAL: 348 MS
QTC INTERVAL: 425 MS
RBC # BLD AUTO: 4.33 10*6/MM3 (ref 3.77–5.28)
SODIUM SERPL-SCNC: 134 MMOL/L (ref 136–145)
SODIUM SERPL-SCNC: 135 MMOL/L (ref 136–145)
WBC NRBC COR # BLD AUTO: 13.58 10*3/MM3 (ref 3.4–10.8)

## 2025-07-20 PROCEDURE — 85025 COMPLETE CBC W/AUTO DIFF WBC: CPT | Performed by: STUDENT IN AN ORGANIZED HEALTH CARE EDUCATION/TRAINING PROGRAM

## 2025-07-20 PROCEDURE — 94799 UNLISTED PULMONARY SVC/PX: CPT

## 2025-07-20 PROCEDURE — 94761 N-INVAS EAR/PLS OXIMETRY MLT: CPT

## 2025-07-20 PROCEDURE — 84132 ASSAY OF SERUM POTASSIUM: CPT | Performed by: STUDENT IN AN ORGANIZED HEALTH CARE EDUCATION/TRAINING PROGRAM

## 2025-07-20 PROCEDURE — 80053 COMPREHEN METABOLIC PANEL: CPT | Performed by: STUDENT IN AN ORGANIZED HEALTH CARE EDUCATION/TRAINING PROGRAM

## 2025-07-20 PROCEDURE — 71045 X-RAY EXAM CHEST 1 VIEW: CPT

## 2025-07-20 PROCEDURE — 99232 SBSQ HOSP IP/OBS MODERATE 35: CPT | Performed by: STUDENT IN AN ORGANIZED HEALTH CARE EDUCATION/TRAINING PROGRAM

## 2025-07-20 PROCEDURE — 63710000001 INSULIN REGULAR HUMAN PER 5 UNITS: Performed by: STUDENT IN AN ORGANIZED HEALTH CARE EDUCATION/TRAINING PROGRAM

## 2025-07-20 PROCEDURE — 94660 CPAP INITIATION&MGMT: CPT

## 2025-07-20 PROCEDURE — 94664 DEMO&/EVAL PT USE INHALER: CPT

## 2025-07-20 PROCEDURE — 93010 ELECTROCARDIOGRAM REPORT: CPT | Performed by: INTERNAL MEDICINE

## 2025-07-20 PROCEDURE — 25010000002 CEFTRIAXONE PER 250 MG: Performed by: STUDENT IN AN ORGANIZED HEALTH CARE EDUCATION/TRAINING PROGRAM

## 2025-07-20 PROCEDURE — 92612 ENDOSCOPY SWALLOW (FEES) VID: CPT

## 2025-07-20 PROCEDURE — 25010000002 CALCIUM GLUCONATE 2-0.675 GM/100ML-% SOLUTION: Performed by: STUDENT IN AN ORGANIZED HEALTH CARE EDUCATION/TRAINING PROGRAM

## 2025-07-20 PROCEDURE — 92610 EVALUATE SWALLOWING FUNCTION: CPT

## 2025-07-20 PROCEDURE — 25010000002 METHYLPREDNISOLONE PER 40 MG: Performed by: STUDENT IN AN ORGANIZED HEALTH CARE EDUCATION/TRAINING PROGRAM

## 2025-07-20 PROCEDURE — 93005 ELECTROCARDIOGRAM TRACING: CPT | Performed by: STUDENT IN AN ORGANIZED HEALTH CARE EDUCATION/TRAINING PROGRAM

## 2025-07-20 RX ORDER — DEXTROSE MONOHYDRATE 100 MG/ML
50 INJECTION, SOLUTION INTRAVENOUS CONTINUOUS
Status: ACTIVE | OUTPATIENT
Start: 2025-07-20 | End: 2025-07-20

## 2025-07-20 RX ORDER — DEXTROSE MONOHYDRATE AND SODIUM CHLORIDE 5; .45 G/100ML; G/100ML
50 INJECTION, SOLUTION INTRAVENOUS CONTINUOUS
Status: ACTIVE | OUTPATIENT
Start: 2025-07-20 | End: 2025-07-21

## 2025-07-20 RX ORDER — CALCIUM GLUCONATE 20 MG/ML
2000 INJECTION, SOLUTION INTRAVENOUS ONCE
Status: COMPLETED | OUTPATIENT
Start: 2025-07-20 | End: 2025-07-20

## 2025-07-20 RX ADMIN — CALCIUM GLUCONATE 2000 MG: 20 INJECTION, SOLUTION INTRAVENOUS at 09:35

## 2025-07-20 RX ADMIN — IPRATROPIUM BROMIDE AND ALBUTEROL SULFATE 3 ML: 2.5; .5 SOLUTION RESPIRATORY (INHALATION) at 19:38

## 2025-07-20 RX ADMIN — DEXTROSE AND SODIUM CHLORIDE 50 ML/HR: 5; 450 INJECTION, SOLUTION INTRAVENOUS at 14:46

## 2025-07-20 RX ADMIN — Medication 1 CAPSULE: at 08:36

## 2025-07-20 RX ADMIN — DEXTROSE MONOHYDRATE 50 ML/HR: 100 INJECTION, SOLUTION INTRAVENOUS at 07:34

## 2025-07-20 RX ADMIN — HUMAN INSULIN 10 UNITS: 100 INJECTION, SOLUTION SUBCUTANEOUS at 07:34

## 2025-07-20 RX ADMIN — IPRATROPIUM BROMIDE AND ALBUTEROL SULFATE 3 ML: 2.5; .5 SOLUTION RESPIRATORY (INHALATION) at 07:18

## 2025-07-20 RX ADMIN — Medication 5 MG: at 23:03

## 2025-07-20 RX ADMIN — Medication 10 ML: at 23:03

## 2025-07-20 RX ADMIN — METHYLPREDNISOLONE SODIUM SUCCINATE 40 MG: 40 INJECTION, POWDER, FOR SOLUTION INTRAMUSCULAR; INTRAVENOUS at 08:36

## 2025-07-20 RX ADMIN — AZITHROMYCIN DIHYDRATE 500 MG: 250 TABLET ORAL at 08:36

## 2025-07-20 RX ADMIN — APIXABAN 5 MG: 5 TABLET, FILM COATED ORAL at 23:02

## 2025-07-20 RX ADMIN — APIXABAN 5 MG: 5 TABLET, FILM COATED ORAL at 08:36

## 2025-07-20 RX ADMIN — CEFTRIAXONE SODIUM 1000 MG: 1 INJECTION, POWDER, FOR SOLUTION INTRAMUSCULAR; INTRAVENOUS at 15:13

## 2025-07-20 RX ADMIN — SODIUM ZIRCONIUM CYCLOSILICATE 10 G: 10 POWDER, FOR SUSPENSION ORAL at 08:36

## 2025-07-20 RX ADMIN — Medication 10 ML: at 08:37

## 2025-07-20 RX ADMIN — SODIUM ZIRCONIUM CYCLOSILICATE 10 G: 10 POWDER, FOR SUSPENSION ORAL at 20:48

## 2025-07-20 RX ADMIN — BUDESONIDE AND FORMOTEROL FUMARATE DIHYDRATE 2 PUFF: 160; 4.5 AEROSOL RESPIRATORY (INHALATION) at 19:38

## 2025-07-20 RX ADMIN — Medication 100 MG: at 08:36

## 2025-07-20 RX ADMIN — NICOTINE 1 PATCH: 21 PATCH, EXTENDED RELEASE TRANSDERMAL at 08:37

## 2025-07-20 RX ADMIN — BUDESONIDE AND FORMOTEROL FUMARATE DIHYDRATE 2 PUFF: 160; 4.5 AEROSOL RESPIRATORY (INHALATION) at 07:18

## 2025-07-20 RX ADMIN — SODIUM ZIRCONIUM CYCLOSILICATE 10 G: 10 POWDER, FOR SUSPENSION ORAL at 15:13

## 2025-07-20 RX ADMIN — IPRATROPIUM BROMIDE AND ALBUTEROL SULFATE 3 ML: 2.5; .5 SOLUTION RESPIRATORY (INHALATION) at 11:54

## 2025-07-20 NOTE — PLAN OF CARE
Goal Outcome Evaluation:  Plan of Care Reviewed With: patient, other (see comments) (Jose Daniel)                Anticipated Discharge Disposition (SLP): home with 24/7 care          SLP Swallowing Diagnosis: suspected pharyngeal dysphagia (07/20/25 6039)

## 2025-07-20 NOTE — PROGRESS NOTES
[facility] Medicine Services  PROGRESS NOTE    Patient Name: Elisabeth Burger  : 1950  MRN: [id number]    Date of Admission: 2025  Primary Care Physician: Provider, No Known    Subjective   Subjective     CC:  Lower extremity edema     HPI:  Still dyspneic with increasing oxygen requirement, obtained speech consult due to concern for dysphagia    Objective   Objective     Vital Signs:   Temp:  [98 °F (36.7 °C)-99.2 °F (37.3 °C)] 98 °F (36.7 °C)  Heart Rate:  [] 96  Resp:  [17-40] 20  BP: ()/(52-70) 98/52  Flow (L/min) (Oxygen Therapy):  [4-5.5] 4     Physical Exam:  Constitutional: No acute distress, awake, alert frail, cachectic, chronically ill-appearing  HENT: NCAT, mucous membranes moist, edentulous  Respiratory: Diminished breath sounds globally.  Cardiovascular: RRR  Gastrointestinal: Positive bowel sounds, soft, nontender, nondistended  Musculoskeletal: No bilateral ankle edema  Psychiatric: Appropriate affect, cooperative  Neurologic: Alert, oriented, symmetric facies, moves all extremities, speech clear  Skin: No rashes    Results Reviewed:  LAB RESULTS:      Lab 25  04025   WBC 13.58* 8.30  --  9.97   HEMOGLOBIN 12.5 12.5  --  13.1   HEMATOCRIT 42.0 41.3  --  43.5   PLATELETS 241 221  --  232   NEUTROS ABS 10.83* 5.46  --  6.53   IMMATURE GRANS (ABS) 0.05 0.02  --  0.03   LYMPHS ABS 1.72 1.61  --  2.39   MONOS ABS 0.92* 0.70  --  0.64   EOS ABS 0.02 0.43*  --  0.31   MCV 97.0 94.5  --  94.0   HSTROP T  --   --  26* 27*         Lab 25  0401 25  0841 25   SODIUM 135* 136  --  144 140   POTASSIUM 6.1* 4.8 4.2 3.1* 2.8*   CHLORIDE 97* 90*  --  96* 97*   CO2 31.6* 35.8*  --  38.8* 35.3*   ANION GAP 6.4 10.2  --  9.2 7.7   BUN 10.7 8.6  --  5.8* 6.2*   CREATININE 0.56* 0.62  --  0.56* 0.66   EGFR 94.7 92.4  --  94.7 91.0   GLUCOSE 94 118*  --  153* 147*    CALCIUM 8.6 8.7  --  8.0* 8.6   MAGNESIUM  --  2.3  --   --  1.9   PHOSPHORUS  --  2.0*  --   --   --    HEMOGLOBIN A1C  --   --   --   --  5.50   TSH  --   --   --  1.150  --          Lab 07/20/25  0401 07/19/25  0841 07/18/25  0340 07/17/25 2016   TOTAL PROTEIN 6.5 6.5 5.8* 5.9*   ALBUMIN 3.4* 3.5 3.2* 3.5   GLOBULIN 3.1 3.0 2.6 2.4   ALT (SGPT) 34* 24 26 33   AST (SGOT) 39* 45* 30 40*   BILIRUBIN 0.2 0.2 0.2 0.2   ALK PHOS 91 91 89 96         Lab 07/17/25  2143 07/17/25 2016   PROBNP  --  2,586.0*   HSTROP T 26* 27*                 Lab 07/19/25 2018   PH, ARTERIAL 7.389   PCO2, ARTERIAL 70.5*   PO2 ART 65.3*   FIO2 36   HCO3 ART 42.5*   BASE EXCESS ART 14.3*   CARBOXYHEMOGLOBIN 1.6     Brief Urine Lab Results  (Last result in the past 365 days)        Color   Clarity   Blood   Leuk Est   Nitrite   Protein   CREAT   Urine HCG        07/17/25 2220 Yellow   Clear   Trace   Small (1+)   Negative   Negative                   Microbiology Results Abnormal       None            CT Chest Without Contrast Diagnostic  Result Date: 7/19/2025  CT CHEST WO CONTRAST DIAGNOSTIC Date of Exam: 7/19/2025 4:01 PM EDT Indication: shortness of breath. Comparison: Chest CT 3/15/2024. Technique: Axial CT images were obtained of the chest without contrast administration.  Reconstructed coronal and sagittal images were also obtained. Automated exposure control and iterative construction methods were used. Findings: Bilateral lower lobe bronchial wall thickening with mucoid impaction and downstream nodular consolidations, compatible with an infectious/inflammatory process. Advanced emphysematous changes. No suspicious pulmonary nodule. Trace right pleural effusion. No pneumothorax. Upper limits of normal caliber atherosclerotic thoracic aorta, unchanged. Coronary artery calcifications. No pericardial effusion. No pathologically enlarged lymph nodes. No acute chest wall abnormality. Visualized portions of the upper abdomen demonstrate  no acute findings. Multilevel thoracic spondylosis. No acute or suspicious osseous lesion.     Impression: Impression: Bilateral lower lobe infectious/inflammatory process with endobronchial component, possibly related to aspiration. Trace right pleural effusion. Advanced emphysematous changes. Electronically Signed: Ayo Catherine MD  7/19/2025 5:51 PM EDT  Workstation ID: [id number]      Results for orders placed during the hospital encounter of 07/17/25    Adult Transthoracic Echo Complete With Contrast if Necessary Per Protocol 07/18/2025 11:02 AM    Interpretation Summary    Left ventricular systolic function is normal. Calculated left ventricular EF = 65%    Left ventricular diastolic function was normal.    The right ventricular cavity is moderately dilated.    The right atrial cavity is mildly dilated.      Current medications:  Scheduled Meds:apixaban, 5 mg, Oral, Q12H  budesonide-formoterol, 2 puff, Inhalation, BID - RT  cefTRIAXone, 1,000 mg, Intravenous, Q24H  ipratropium-albuterol, 3 mL, Nebulization, 4x Daily - RT  lactobacillus acidophilus, 1 capsule, Oral, Daily  melatonin, 5 mg, Oral, Nightly  methylPREDNISolone sodium succinate, 40 mg, Intravenous, Daily  nicotine, 1 patch, Transdermal, Q24H  pharmacy consult - MTM, , Not Applicable, Daily  senna-docusate sodium, 2 tablet, Oral, BID   And  polyethylene glycol, 17 g, Oral, Daily  sodium chloride, 10 mL, Intravenous, Q12H  sodium zirconium cyclosilicate, 10 g, Oral, TID  thiamine, 100 mg, Oral, Daily      Continuous Infusions:     PRN Meds:.  acetaminophen **OR** acetaminophen **OR** acetaminophen    senna-docusate sodium **AND** polyethylene glycol **AND** bisacodyl **AND** bisacodyl    ipratropium-albuterol    nitroglycerin    ondansetron    Potassium Replacement - Follow Nurse / BPA Driven Protocol    sodium chloride    [COMPLETED] Insert Peripheral IV **AND** sodium chloride    sodium chloride    sodium chloride    Assessment & Plan   Assessment &  Plan     Active Hospital Problems    Diagnosis  POA    **COPD with acute exacerbation [J44.1]  Yes    Rectal bleeding [K62.5]  Yes    Supplemental oxygen dependent [Z99.81]  Not Applicable    Chronic respiratory failure with hypoxia [J96.11]  Yes    Pulmonary nodule (7mm LLL incidental) [R91.1]  Yes    COPD (? Severity) [J44.9]  Yes    Severe malnutrition [E43]  Yes      Resolved Hospital Problems   No resolved problems to display.        Brief Hospital Course to date:  Elisabeth Burger is a 74 y.o. female with hx of COPD with chronic respiratory failure on 4L NC, known pulmonary nodule, former tobacco use disorder, HFpEF, who presented for evaluation of LE edema & shortness of breath. Patient had been out of medications for several weeks prior to presentation. CT abdomen/pelvis showed anasarca, left inguinal hernia, diverticulosis, chronic L2 compression fracture.    Acute copd exacerbation   -Continue scheduled duonebs  -Continue Solumedrol/ICS/Symbicort/Spiriva  - Maintain sats between 88-92%  - continuous pulse ox  - CT chest from yesterday showed Bilateral lower lobe infectious/inflammatory process with endobronchial component, possibly related to aspiration/Advanced emphysematous changes.   - will obtain speech eval. Continue IV rocephin  - aspiration precautions  - Npo, start maintenance fluids in the interim.      Hyperkalemia, with early EKG changes  - Obtained stat EKG: T waves appear peaked and tall notably leads V2-V4 and narrow based concerning for early changes Irregularly irregular rhythm, indistinct p waves  - IV ca gluconate to stabilize cardiac membrane  - insulin and glucose pushed  - Repeat Potassium levels  - Monitor on telemetry  - Begin Lokelma 10 mg TID    Hx of HFpEF  - imaging w anasarca with pulmonary edema and small ascites  - TTE 6/2023 with EF 56 to 60%; right ventricular systolic pressure from tricuspid regurgitation is normal (<35 mmHg); Left ventricular diastolic function is  consistent with (grade I) impaired relaxation.   - repeat echo 7/18: EF of 65% with normal diastolic function    Chronic Respiratory Failure  Advanced COPD  - Known Pulmonary Nodule; 7 x 6 x 5 mm diameter medial left lower lobe pulmonary nodule seen on CT.   - on 4L NC at baseline.  - continue Symbicort + Spiriva    Rectal bleeding, resolved      Tobacco use disorder  - nicotine patch     Severe Malnutrition BMI 14  Failure to thrive  - likely related to advanced copd  - consulted nutrition  - PT/OT rec home         Expected Discharge Location and Transportation: home w  and 24/7 care   Expected Discharge   Expected discharge date/ time has not been documented.     VTE Prophylaxis:  Pharmacologic & mechanical VTE prophylaxis orders are present.         AM-PAC 6 Clicks Score (PT): 20 (07/20/25 6499)    CODE STATUS:   Code Status and Medical Interventions: CPR (Attempt to Resuscitate); Full Support   Ordered at: 07/17/25 1320     Code Status (Patient has no pulse and is not breathing):    CPR (Attempt to Resuscitate)     Medical Interventions (Patient has pulse or is breathing):    Full Support     Level Of Support Discussed With:    Patient       Pat Edwards MD  07/20/25

## 2025-07-20 NOTE — THERAPY EVALUATION
Acute Care - Speech Language Pathology   Swallow Initial Evaluation   [facility]    Clinical Swallow Evaluation       Patient Name: Elisabeth Burger  : 1950  MRN: [id number]  Today's Date: 2025               Admit Date: 2025    Visit Dx:     ICD-10-CM ICD-9-CM   1. Acute on chronic congestive heart failure, unspecified heart failure type  I50.9 428.0   2. Pulmonary congestion  R09.89 514   3. Peripheral edema  R60.0 782.3   4. Hypoxia  R09.02 799.02   5. Acute hypokalemia  E87.6 276.8   6. Anal bleeding  K62.5 569.3   7. Dysphagia, unspecified type  R13.10 787.20     Patient Active Problem List   Diagnosis    Tobacco use    Severe malnutrition    COPD (? Severity)    Pulmonary nodule (7mm LLL incidental)    Chronic respiratory failure with hypoxia    Supplemental oxygen dependent    COPD with acute exacerbation    Rectal bleeding     Past Medical History:   Diagnosis Date    COPD (chronic obstructive pulmonary disease)      History reviewed. No pertinent surgical history.    SLP Recommendation and Plan  SLP Swallowing Diagnosis: suspected pharyngeal dysphagia (25)  SLP Diet Recommendation: NPO, other (see comments) (until FEES completed) (25)  Recommended Precautions and Strategies: general aspiration precautions (25)  SLP Rec. for Method of Medication Administration: meds via alternate route (25)     Monitor for Signs of Aspiration: notify SLP if any concerns (25)  Recommended Diagnostics: FEES (25)  Swallow Criteria for Skilled Therapeutic Interventions Met: demonstrates skilled criteria (25)  Anticipated Discharge Disposition (SLP): home with /7 care (25)  Rehab Potential/Prognosis, Swallowing: adequate, monitor progress closely (25)  Therapy Frequency (Swallow): evaluation only (25)  Predicted Duration Therapy Intervention (Days): 1 week (25)  Oral Care Recommendations:  Oral Care BID/PRN, Toothbrush (07/20/25 8641)                                               SWALLOW EVALUATION (Last 72 Hours)       SLP Adult Swallow Evaluation       Row Name 07/20/25 0983                   Rehab Evaluation    Document Type evaluation  -        Subjective Information no complaints  -        Patient Observations alert;cooperative;agree to therapy  -        Patient/Family/Caregiver Comments/Observations family present  -        Patient Effort adequate  -        Symptoms Noted During/After Treatment none  -           General Information    Patient Profile Reviewed yes  -        Pertinent History Of Current Problem Pt admitted with BLL pna with concerns for aspiration, COPD exacerbation after a transient unresponsive episode, rectal bleeding. Hx of pulmonaryu nodule, frequent cough, severe malnutrition. NIece/caregiver reported that pt is non-compliant with wearing O2, smoking, and taking meds.  -        Current Method of Nutrition regular textures;thin liquids  -        Precautions/Limitations, Vision WFL;for purposes of eval  -        Precautions/Limitations, Hearing WFL;for purposes of eval  -        Prior Level of Function-Communication unknown  -        Prior Level of Function-Swallowing no diet consistency restrictions;other (see comments)  patient and niece report coughing with intake.  -        Plans/Goals Discussed with patient and family;agreed upon  -        Barriers to Rehab medically complex;cognitive status  hx of non compliance  -        Patient's Goals for Discharge patient did not state  -        Family Goals for Discharge patient able to eat/drink without coughing/choking  -           Pain    Pretreatment Pain Rating 0/10 - no pain  -        Posttreatment Pain Rating 0/10 - no pain  -           Oral Motor Structure and Function    Dentition Assessment upper dentures/partial in place;lower dentures/partial in place  -        Secretion Management  WNL/WFL  -CH        Mucosal Quality dry  -           Oral Musculature and Cranial Nerve Assessment    Oral Motor General Assessment generalized oral motor weakness  -           General Eating/Swallowing Observations    Respiratory Support Currently in Use nasal cannula  -        O2 Liters 4L  -CH        Eating/Swallowing Skills self-fed;fed by SLP;appropriate self-feeding skills observed  -        Positioning During Eating upright 90 degree;upright in bed  -        Utensils Used spoon;cup;straw  -        Consistencies Trialed ice chips;thin liquids;pureed  -        Pre SpO2 (%) 92  -CH        Post SpO2 (%) 85  -CH           Respiratory    Respiratory Status reduction in SpO2;during swallowing/eating  -        Respiratory Pattern decrease control/incoordination of breathing swallow  -           Clinical Swallow Eval    Oral Prep Phase impaired  -        Oral Transit impaired  -CH        Oral Residue WFL  -        Pharyngeal Phase suspected pharyngeal impairment  -           Oral Prep Concerns    Oral Prep Concerns increased prep time  -        Increased Prep Time pudding  -           Oral Transit Concerns    Oral Transit Concerns delayed initiation of bolus transit  -        Delayed Intiation of Bolus Transit regular consistencies  -           Pharyngeal Phase Concerns    Pharyngeal Phase Concerns wet vocal quality;cough;multiple swallows  -        Wet Vocal Quality thin;pudding  -        Multiple Swallows thin;pudding  -        Cough pudding  -           Swallowing Quality of Life Assessment    Education and counseling provided Signs of aspiration;Risks of aspiration;Oral care recommendations and rationale  -           SLP Evaluation Clinical Impression    SLP Swallowing Diagnosis suspected pharyngeal dysphagia  -        Functional Impact no impact on function  -        Rehab Potential/Prognosis, Swallowing adequate, monitor progress closely  -        Swallow Criteria  for Skilled Therapeutic Interventions Met demonstrates skilled criteria  -           Recommendations    Therapy Frequency (Swallow) evaluation only  -        Predicted Duration Therapy Intervention (Days) 1 week  -        SLP Diet Recommendation NPO;other (see comments)  until FEES completed  -        Recommended Diagnostics FEES  -        Recommended Precautions and Strategies general aspiration precautions  -        Oral Care Recommendations Oral Care BID/PRN;Toothbrush  -        SLP Rec. for Method of Medication Administration meds via alternate route  -        Monitor for Signs of Aspiration notify SLP if any concerns  -        Anticipated Discharge Disposition (SLP) home with 24/7 care  -                  User Key  (r) = Recorded By, (t) = Taken By, (c) = Cosigned By      Initials Name Effective Dates    Mary Posada MS CCC-SLP 01/20/25 -                     EDUCATION  The patient has been educated in the following areas:   Dysphagia (Swallowing Impairment) Oral Care/Hydration NPO rationale.                Time Calculation:    Time Calculation- SLP       Row Name 07/20/25 1324             Time Calculation- [facility]    SLP Start Time 0955  -      SLP Received On 07/20/25  -         Untimed Charges    SLP Eval/Re-eval  ST Eval Oral Pharyng Swallow - 94081  -CH      [id number] Eval Oral Pharyng Swallow Minutes 53  -CH         Total Minutes    Untimed Charges Total Minutes 53  -CH       Total Minutes 53  -CH                User Key  (r) = Recorded By, (t) = Taken By, (c) = Cosigned By      Initials Name Provider Type    Mary Posada MS CCC-SLP Speech and Language Pathologist                    Therapy Charges for Today       Code Description Service Date Service Provider Modifiers Qty    [id number] HC ST EVAL ORAL PHARYNG SWALLOW 4 7/20/2025 Mary Musa MS CCC-SLP GN 1                 Mary Musa MS CCC-EPIFANIO  7/20/2025

## 2025-07-20 NOTE — PLAN OF CARE
Problem: Adult Inpatient Plan of Care  Goal: Plan of Care Review  Outcome: Progressing  Goal: Patient-Specific Goal (Individualized)  Outcome: Progressing  Goal: Absence of Hospital-Acquired Illness or Injury  Outcome: Progressing  Intervention: Identify and Manage Fall Risk  Recent Flowsheet Documentation  Taken 7/19/2025 2138 by Portillo Grant RN  Safety Promotion/Fall Prevention: activity supervised  Intervention: Prevent Skin Injury  Recent Flowsheet Documentation  Taken 7/19/2025 2138 by Portillo Grant RN  Body Position: sitting up in bed  Skin Protection: incontinence pads utilized  Goal: Optimal Comfort and Wellbeing  Outcome: Progressing  Intervention: Provide Person-Centered Care  Recent Flowsheet Documentation  Taken 7/19/2025 2138 by Portillo Grant RN  Trust Relationship/Rapport:   care explained   questions answered  Goal: Readiness for Transition of Care  Outcome: Progressing     Problem: Heart Failure  Goal: Optimal Coping  Outcome: Progressing  Intervention: Support Psychosocial Response  Recent Flowsheet Documentation  Taken 7/19/2025 2138 by Portillo Grant RN  Supportive Measures: active listening utilized  Goal: Optimal Cardiac Output and Blood Flow  Outcome: Progressing  Intervention: Optimize Cardiac Output  Recent Flowsheet Documentation  Taken 7/19/2025 2138 by Portillo Grant RN  Environmental Support: calm environment promoted  Goal: Stable Heart Rate and Rhythm  Outcome: Progressing  Goal: Fluid and Electrolyte Balance  Outcome: Progressing  Goal: Optimal Functional Ability  Outcome: Progressing  Intervention: Optimize Functional Ability  Recent Flowsheet Documentation  Taken 7/19/2025 2138 by Portillo Grant RN  Activity Management: activity encouraged  Goal: Improved Oral Intake  Outcome: Progressing  Goal: Effective Oxygenation and Ventilation  Outcome: Progressing  Intervention: Promote Airway Secretion Clearance  Recent Flowsheet Documentation  Taken 7/19/2025 2138 by  Portillo Grant, RN  Activity Management: activity encouraged  Cough And Deep Breathing: done independently per patient  Goal: Effective Breathing Pattern During Sleep  Outcome: Progressing     Problem: Comorbidity Management  Goal: Maintenance of COPD Symptom Control  Outcome: Progressing  Goal: Maintenance of Heart Failure Symptom Control  Outcome: Progressing     Problem: Skin Injury Risk Increased  Goal: Skin Health and Integrity  Outcome: Progressing  Intervention: Optimize Skin Protection  Recent Flowsheet Documentation  Taken 7/19/2025 2138 by Portillo Grant, RN  Activity Management: activity encouraged  Pressure Reduction Techniques: pressure points protected  Pressure Reduction Devices: foam padding utilized  Skin Protection: incontinence pads utilized     Problem: Fall Injury Risk  Goal: Absence of Fall and Fall-Related Injury  Outcome: Progressing  Intervention: Promote Injury-Free Environment  Recent Flowsheet Documentation  Taken 7/19/2025 2138 by Portillo Grant RN  Safety Promotion/Fall Prevention: activity supervised   Goal Outcome Evaluation:

## 2025-07-20 NOTE — NURSING NOTE
Pt's family rushed out to hallway to tell RN that pt was on the floor. On getting to pt's room, pt was sitting up on the floor with ex cath attached and stool on the floor. The patient stated that she was trying to use the commode on her own. Assessment was done and patient denied any pain or hitting head on floor. Reeducation on call light use was done. The on call PA was contacted and is aware of incident.

## 2025-07-20 NOTE — MBS/VFSS/FEES
Acute Care - Speech Language Pathology   Swallow Initial Evaluation   [facility]    Fiberoptic Endoscopic Evaluation of Swallowing (FEES)       Patient Name: Elisabeth Burger  : 1950  MRN: [id number]  Today's Date: 2025               Admit Date: 2025    Visit Dx:     ICD-10-CM ICD-9-CM   1. Acute on chronic congestive heart failure, unspecified heart failure type  I50.9 428.0   2. Pulmonary congestion  R09.89 514   3. Peripheral edema  R60.0 782.3   4. Hypoxia  R09.02 799.02   5. Acute hypokalemia  E87.6 276.8   6. Anal bleeding  K62.5 569.3   7. Oropharyngeal dysphagia  R13.12 787.22     Patient Active Problem List   Diagnosis    Tobacco use    Severe malnutrition    COPD (? Severity)    Pulmonary nodule (7mm LLL incidental)    Chronic respiratory failure with hypoxia    Supplemental oxygen dependent    COPD with acute exacerbation    Rectal bleeding     Past Medical History:   Diagnosis Date    COPD (chronic obstructive pulmonary disease)      History reviewed. No pertinent surgical history.    SLP Recommendation and Plan  SLP Swallowing Diagnosis: mild, oral dysphagia, pharyngeal dysphagia (25)  SLP Diet Recommendation: soft to chew textures, chopped, thin liquids (25)  Recommended Precautions and Strategies: upright posture during/after eating, small bites of food and sips of liquid, no straw, general aspiration precautions, fatigue precautions, assist with feeding, other (see comments) (cue for small single bites/sips, no straw.) (25)  SLP Rec. for Method of Medication Administration: meds whole, with puree, as tolerated (25)     Monitor for Signs of Aspiration: notify SLP if any concerns (25)  Recommended Diagnostics: FEES (25 [id number])  Swallow Criteria for Skilled Therapeutic Interventions Met: demonstrates skilled criteria (25)  Anticipated Discharge Disposition (SLP): home with /7 care (25)  Rehab  Potential/Prognosis, Swallowing: adequate, monitor progress closely (07/20/25 1300)  Therapy Frequency (Swallow): 5 days per week (07/20/25 1300)  Predicted Duration Therapy Intervention (Days): 1 week (07/20/25 1300)  Oral Care Recommendations: Oral Care BID/PRN, Toothbrush (07/20/25 1300)                                               SWALLOW EVALUATION (Last 72 Hours)       SLP Adult Swallow Evaluation       Row Name 07/20/25 1300 07/20/25 0976                Rehab Evaluation    Document Type evaluation;other (see comments)  FEES  -CH evaluation  -CH       Subjective Information no complaints  -CH no complaints  -CH       Patient Observations alert;cooperative;agree to therapy  -CH alert;cooperative;agree to therapy  -CH       Patient/Family/Caregiver Comments/Observations family present  -CH family present  -CH       Patient Effort adequate  -CH adequate  -CH       Symptoms Noted During/After Treatment none  -CH none  -CH          General Information    Patient Profile Reviewed yes  -CH yes  -CH       Pertinent History Of Current Problem see initial evaluation  -CH Pt admitted with BLL pna with concerns for aspiration, COPD exacerbation after a transient unresponsive episode, rectal bleeding. Hx of pulmonaryu nodule, frequent cough, severe malnutrition. NIece/caregiver reported that pt is non-compliant with wearing O2, smoking, and taking meds.  -CH       Current Method of Nutrition NPO;other (see comments)  pending FEES  -CH regular textures;thin liquids  -CH       Precautions/Limitations, Vision WFL;for purposes of eval  -CH WFL;for purposes of eval  -CH       Precautions/Limitations, Hearing WFL;for purposes of eval  -CH WFL;for purposes of eval  -CH       Prior Level of Function-Communication unknown  -CH unknown  -CH       Prior Level of Function-Swallowing no diet consistency restrictions;other (see comments)  pt and niece report cough w intake  -CH no diet consistency restrictions;other (see comments)   patient and niece report coughing with intake.  -       Plans/Goals Discussed with patient and family;agreed upon  - patient and family;agreed upon  -       Barriers to Rehab medically complex;cognitive status  - medically complex;cognitive status  hx of non compliance  -       Patient's Goals for Discharge patient did not state  - patient did not state  -       Family Goals for Discharge patient able to eat/drink without coughing/choking  - patient able to eat/drink without coughing/choking  -          Pain    Pretreatment Pain Rating 0/10 - no pain  - 0/10 - no pain  -       Posttreatment Pain Rating 0/10 - no pain  - 0/10 - no pain  -          Oral Motor Structure and Function    Dentition Assessment -- upper dentures/partial in place;lower dentures/partial in place  -       Secretion Management -- WNL/WFL  -       Mucosal Quality -- dry  -          Oral Musculature and Cranial Nerve Assessment    Oral Motor General Assessment -- generalized oral motor weakness  -          General Eating/Swallowing Observations    Respiratory Support Currently in Use -- nasal cannula  -       O2 Liters -- 4L  -       Eating/Swallowing Skills -- self-fed;fed by SLP;appropriate self-feeding skills observed  -       Positioning During Eating -- upright 90 degree;upright in bed  -       Utensils Used -- spoon;cup;straw  -       Consistencies Trialed -- ice chips;thin liquids;pureed  -       Pre SpO2 (%) -- 92  -CH       Post SpO2 (%) -- 85  -          Respiratory    Respiratory Status -- reduction in SpO2;during swallowing/eating  -       Respiratory Pattern -- decrease control/incoordination of breathing swallow  -          Clinical Swallow Eval    Oral Prep Phase -- impaired  -       Oral Transit -- impaired  -CH       Oral Residue -- WFL  -       Pharyngeal Phase -- suspected pharyngeal impairment  -          Oral Prep Concerns    Oral Prep Concerns -- increased prep time   -CH       Increased Prep Time -- pudding  -CH          Oral Transit Concerns    Oral Transit Concerns -- delayed initiation of bolus transit  -CH       Delayed Intiation of Bolus Transit -- regular consistencies  -CH          Pharyngeal Phase Concerns    Pharyngeal Phase Concerns -- wet vocal quality;cough;multiple swallows  -CH       Wet Vocal Quality -- thin;pudding  -CH       Multiple Swallows -- thin;pudding  -CH       Cough -- pudding  -CH          Fiberoptic Endoscopic Evaluation of Swallowing (FEES)    Risks/Benefits Reviewed risks/benefits explained;patient;family;agreed to eval  -CH --       Nasal Entry right:  -CH --       Scope serial number/identification [id number]  -CH --          Anatomy and Physiology    Anatomic Considerations edema;vocal folds  -CH --       Velopharyngeal WFL  -CH --       Base of Tongue symmetrical  -CH --       Epiglottis WFL  -CH --       Laryngeal Function Breathing symmetrical  -CH --       Laryngeal Function Phonation symmetrical  -CH --       Laryngeal Function to Breath Hold TVF contact  -CH --       Secretion Rating Scale (Stephen et al. 1996) 0- normal, no visible secretions  -CH --       Spontaneous Swallow frequency adequate  -CH --       Sensory sensed scope  -CH --       Utensils Used Spoon;Cup;Straw  -CH --       Consistencies Trialed thin liquids;nectar-thick liquids;pudding/puree;regular textures  -CH --          FEES Interpretation    Oral Phase reduced lingual control;prolonged manipulation;prespill of liquids into pharynx;with liquids only  -CH --          Initiation of Pharyngeal Swallow    Initiation of Pharyngeal Swallow bolus in pyriform sinuses  -CH --       Pharyngeal Phase impaired pharyngeal phase of swallowing  -CH --       Penetration Before the Swallow thin liquids;secondary to reduced back of tongue control;secondary to delayed swallow initiation or mistiming  -CH --       Penetration During the Swallow thin liquids;nectar-thick liquids;secondary to delayed  swallow initiation or mistiming;secondary to reduced laryngeal elevation;secondary to reduced vestibular closure;other (see comments)  thin and nectar thick via lrg cup sips or by straw.  -CH --       Depth of Penetration deep  -CH --       Response to Penetration No  -CH --       No spontaneous response to penetration and non-effective laryngeal clearance with cue (see comments)  -CH --       Rosenbek's Scale thin:;nectar:;3-->Level 3;pudding/puree:;regular textures:;1-->Level 1  -CH --       Residue thin liquids;pyriform sinuses;secondary to reduced base of tongue retraction;secondary to reduced laryngeal elevation;secondary to reduced hyolaryngeal excursion  -CH --       Response to Residue cleared residue;with spontaneous subsequent swallow  -CH --       Attempted Compensatory Maneuvers bolus size;bolus presentation style;additional subsequent swallow;throat clear after swallow  -CH --       Response to Attempted Compensatory Maneuvers prevented aspiration;reduced residue  -CH --       Successful Compensatory Maneuver Competency family able to;demonstrate compensations;teach back compensations;patient unable to adequately demonstrate/teach back compensations;other (see comments)  without cues  -CH --          Swallowing Quality of Life Assessment    Education and counseling provided Signs of aspiration;Silent aspiration;Risks of aspiration;Safest diet options;Oral care recommendations and rationale;Aspiration precautions;Feeding assistance and techniques  - Signs of aspiration;Risks of aspiration;Oral care recommendations and rationale  -          SLP Evaluation Clinical Impression    SLP Swallowing Diagnosis mild;oral dysphagia;pharyngeal dysphagia  - suspected pharyngeal dysphagia  -       Functional Impact risk of aspiration/pneumonia  -CH no impact on function  -       Rehab Potential/Prognosis, Swallowing adequate, monitor progress closely  -CH adequate, monitor progress closely  -       Swallow  Criteria for Skilled Therapeutic Interventions Met demonstrates skilled criteria  - demonstrates skilled criteria  -          Recommendations    Therapy Frequency (Swallow) 5 days per week  - evaluation only  -       Predicted Duration Therapy Intervention (Days) 1 week  - 1 week  -       SLP Diet Recommendation soft to chew textures;chopped;thin liquids  - NPO;other (see comments)  until FEES completed  -       Recommended Diagnostics -- FEES  -       Recommended Precautions and Strategies upright posture during/after eating;small bites of food and sips of liquid;no straw;general aspiration precautions;fatigue precautions;assist with feeding;other (see comments)  cue for small single bites/sips, no straw.  - general aspiration precautions  -       Oral Care Recommendations Oral Care BID/PRN;Toothbrush  - Oral Care BID/PRN;Toothbrush  -       SLP Rec. for Method of Medication Administration meds whole;with puree;as tolerated  - meds via alternate route  -       Monitor for Signs of Aspiration notify SLP if any concerns  - notify SLP if any concerns  -       Anticipated Discharge Disposition (SLP) home with 24/7 care  - home with 24/7 care  -                 User Key  (r) = Recorded By, (t) = Taken By, (c) = Cosigned By      Initials Name Effective Dates     Mary Musa, MS CCC-SLP 01/20/25 -                     EDUCATION  The patient has been educated in the following areas:   Home Exercise Program (HEP) Dysphagia (Swallowing Impairment) Oral Care/Hydration Modified Diet Instruction.        SLP GOALS       Row Name 07/20/25 1300             (LTG) Patient will demonstrate functional swallow for    Diet Texture (Demonstrate functional swallow) soft to chew (whole) textures  -      Liquid viscosity (Demonstrate functional swallow) thin liquids  -      [place] (Demonstrate functional swallow) independently (over 90% accuracy)  -      Time Frame (Demonstrate  functional swallow) 1 week  -CH         (LTG) Patient will demonstrate progress toward functional swallow for    Diet Texture (Demonstrate progress toward functional swallow) soft to chew (chopped) textures  -CH      Liquid viscosity (Demonstrate progress toward functional swallow) thin liquids  -CH      [place] (Demonstrate progress towards functional swallow) with minimal cues (75-90% accuracy)  -CH      Time Frame (Demonstrate progress toward functional swallow) 1 week  -CH         (STG) Patient will tolerate trials of    Consistencies Trialed (Tolerate trials) soft to chew (chopped) textures;thin liquids  -CH      Desired Outcome (Tolerate trials) without signs/symptoms of aspiration;with adequate oral prep/transit/clearance;with use of compensatory strategies (see comments)  small single bites/sips, no straw, upright positioning. assist to cue for small single bites/sips  -CH      Josephine (Tolerate trials) with minimal cues (75-90% accuracy)  -CH      Time Frame (Tolerate trials) 1 week  -CH         (STG) Lingual Strengthening Goal 1 (SLP)    Activity (Lingual Strengthening Goal 1, SLP) increase tongue back strength  -CH      Increase Tongue Back Strength swallow trials;lingual resistance exercises  -CH      Josephine/Accuracy (Lingual Strengthening Goal 1, SLP) with minimal cues (75-90% accuracy)  -CH      Time Frame (Lingual Strengthening Goal 1, SLP) 1 week  -CH         (STG) Pharyngeal Strengthening Exercise Goal 1 (SLP)    Activity (Pharyngeal Strengthening Goal 1, SLP) increase timing;increase superior movement of the hyolaryngeal complex;increase anterior movement of the hyolaryngeal complex;increase closure at entrance to airway/closure of airway at glottis;increase squeeze/positive pressure generation  -CH      Increase Timing prepping - 3 second prep or suck swallow or 3-step swallow  -CH      Increase Superior Movement of the Hyolaryngeal Complex falsetto  -CH      Increase Anterior  Movement of the Hyolaryngeal Complex chin tuck against resistance (CTAR)  -CH      Increase Closure at Entrance to Airway/Closure of Airway at Glottis super-supraglottic swallow;breath hold exercises  -CH      Increase Squeeze/Positive Pressure Generation hard effortful swallow  -CH      [place]/Accuracy (Pharyngeal Strengthening Goal 1, SLP) with minimal cues (75-90% accuracy)  -CH      Time Frame (Pharyngeal Strengthening Goal 1, SLP) 1 week  -CH         (STG) Swallow Management Recall Goal 1 (SLP)    Activity (Swallow Management Recall Goal 1, SLP) recall of;aspiration precautions;fatigue precautions  -CH      [place]/Accuracy (Swallow Management Recall Goal 1, SLP) with minimal cues (75-90% accuracy)  -CH      Time Frame (Swallow Management Recall Goal 1, SLP) 1 week  -CH                User Key  (r) = Recorded By, (t) = Taken By, (c) = Cosigned By      Initials Name Provider Type    Mary Posada MS CCC-SLP Speech and Language Pathologist                         Time Calculation:    Time Calculation- SLP       Row Name 07/20/25 1548 07/20/25 1324          Time Calculation- SLP    SLP Start Time 1300  -CH 0955  -CH     SLP Received On 07/20/25  -CH 07/20/25  -CH        Untimed Charges    SLP Eval/Re-eval  ST Fiberoptic Endo Eval Swallow - 37891  -CH ST Eval Oral Pharyng Swallow - 58739  -CH     [id number] Eval Oral Pharyng Swallow Minutes -- 53  -CH     [id number] Fiberoptic Endo Eval Swallow Minutes 85  -CH --        Total Minutes    Untimed Charges Total Minutes 85  -CH 53  -CH      Total Minutes 85  -CH 53  -CH               User Key  (r) = Recorded By, (t) = Taken By, (c) = Cosigned By      Initials Name Provider Type    Mary Posada MS CCC-SLP Speech and Language Pathologist                    Therapy Charges for Today       Code Description Service Date Service Provider Modifiers Qty    [id number]  ST EVAL ORAL PHARYNG SWALLOW 4 7/20/2025 Mary Musa MS CCC-SLP GN 1     [id number] [facility] FIBEROPTIC ENDO EVAL SWALL 6 7/20/2025 Mary Musa, MS CCC-SLP GN 1                 aMry Musa MS CCC-SLP  7/20/2025

## 2025-07-20 NOTE — PLAN OF CARE
Goal Outcome Evaluation:  Plan of Care Reviewed With: patient, other (see comments) (Jose Daniel)                Anticipated Discharge Disposition (SLP): home with 24/7 care          SLP Swallowing Diagnosis: mild, oral dysphagia, pharyngeal dysphagia (07/20/25 1300)

## 2025-07-21 LAB
ANION GAP SERPL CALCULATED.3IONS-SCNC: 9 MMOL/L (ref 5–15)
BACTERIA SPEC RESP CULT: NORMAL
BUN SERPL-MCNC: 10.7 MG/DL (ref 8–23)
BUN/CREAT SERPL: 18.1 (ref 7–25)
CALCIUM SPEC-SCNC: 9.1 MG/DL (ref 8.6–10.5)
CHLORIDE SERPL-SCNC: 94 MMOL/L (ref 98–107)
CO2 SERPL-SCNC: 28 MMOL/L (ref 22–29)
CREAT SERPL-MCNC: 0.59 MG/DL (ref 0.57–1)
EGFRCR SERPLBLD CKD-EPI 2021: 93.5 ML/MIN/1.73
GLUCOSE SERPL-MCNC: 75 MG/DL (ref 65–99)
GRAM STN SPEC: NORMAL
POTASSIUM SERPL-SCNC: 4.4 MMOL/L (ref 3.5–5.2)
QT INTERVAL: 350 MS
QT INTERVAL: 404 MS
QTC INTERVAL: 435 MS
QTC INTERVAL: 454 MS
SODIUM SERPL-SCNC: 131 MMOL/L (ref 136–145)

## 2025-07-21 PROCEDURE — 94799 UNLISTED PULMONARY SVC/PX: CPT

## 2025-07-21 PROCEDURE — 97530 THERAPEUTIC ACTIVITIES: CPT

## 2025-07-21 PROCEDURE — 25010000002 METHYLPREDNISOLONE PER 40 MG: Performed by: STUDENT IN AN ORGANIZED HEALTH CARE EDUCATION/TRAINING PROGRAM

## 2025-07-21 PROCEDURE — 94664 DEMO&/EVAL PT USE INHALER: CPT

## 2025-07-21 PROCEDURE — 25010000002 CEFTRIAXONE PER 250 MG: Performed by: STUDENT IN AN ORGANIZED HEALTH CARE EDUCATION/TRAINING PROGRAM

## 2025-07-21 PROCEDURE — 94761 N-INVAS EAR/PLS OXIMETRY MLT: CPT

## 2025-07-21 PROCEDURE — 80048 BASIC METABOLIC PNL TOTAL CA: CPT | Performed by: STUDENT IN AN ORGANIZED HEALTH CARE EDUCATION/TRAINING PROGRAM

## 2025-07-21 PROCEDURE — 99232 SBSQ HOSP IP/OBS MODERATE 35: CPT | Performed by: STUDENT IN AN ORGANIZED HEALTH CARE EDUCATION/TRAINING PROGRAM

## 2025-07-21 PROCEDURE — 97535 SELF CARE MNGMENT TRAINING: CPT

## 2025-07-21 RX ADMIN — Medication 1 CAPSULE: at 08:17

## 2025-07-21 RX ADMIN — BUDESONIDE AND FORMOTEROL FUMARATE DIHYDRATE 2 PUFF: 160; 4.5 AEROSOL RESPIRATORY (INHALATION) at 09:33

## 2025-07-21 RX ADMIN — Medication 100 MG: at 08:17

## 2025-07-21 RX ADMIN — METHYLPREDNISOLONE SODIUM SUCCINATE 40 MG: 40 INJECTION, POWDER, FOR SOLUTION INTRAMUSCULAR; INTRAVENOUS at 08:17

## 2025-07-21 RX ADMIN — CEFTRIAXONE SODIUM 1000 MG: 1 INJECTION, POWDER, FOR SOLUTION INTRAMUSCULAR; INTRAVENOUS at 13:21

## 2025-07-21 RX ADMIN — APIXABAN 5 MG: 5 TABLET, FILM COATED ORAL at 20:03

## 2025-07-21 RX ADMIN — SENNOSIDES, DOCUSATE SODIUM 2 TABLET: 50; 8.6 TABLET, FILM COATED ORAL at 20:03

## 2025-07-21 RX ADMIN — BUDESONIDE AND FORMOTEROL FUMARATE DIHYDRATE 2 PUFF: 160; 4.5 AEROSOL RESPIRATORY (INHALATION) at 19:42

## 2025-07-21 RX ADMIN — IPRATROPIUM BROMIDE AND ALBUTEROL SULFATE 3 ML: 2.5; .5 SOLUTION RESPIRATORY (INHALATION) at 12:49

## 2025-07-21 RX ADMIN — APIXABAN 5 MG: 5 TABLET, FILM COATED ORAL at 08:17

## 2025-07-21 RX ADMIN — IPRATROPIUM BROMIDE AND ALBUTEROL SULFATE 3 ML: 2.5; .5 SOLUTION RESPIRATORY (INHALATION) at 19:42

## 2025-07-21 RX ADMIN — IPRATROPIUM BROMIDE AND ALBUTEROL SULFATE 3 ML: 2.5; .5 SOLUTION RESPIRATORY (INHALATION) at 09:33

## 2025-07-21 RX ADMIN — Medication 10 ML: at 08:18

## 2025-07-21 RX ADMIN — Medication 5 MG: at 20:03

## 2025-07-21 RX ADMIN — NICOTINE 1 PATCH: 21 PATCH, EXTENDED RELEASE TRANSDERMAL at 08:18

## 2025-07-21 RX ADMIN — IPRATROPIUM BROMIDE AND ALBUTEROL SULFATE 3 ML: 2.5; .5 SOLUTION RESPIRATORY (INHALATION) at 15:40

## 2025-07-21 RX ADMIN — SODIUM ZIRCONIUM CYCLOSILICATE 10 G: 10 POWDER, FOR SUSPENSION ORAL at 08:17

## 2025-07-21 NOTE — CASE MANAGEMENT/SOCIAL WORK
Continued Stay Note  [facility]     Patient Name: Elisabeth Burger  MRN: [id number]  Today's Date: 7/21/2025    Admit Date: 7/17/2025    Plan: Home w/HH   Discharge Plan       Row Name 07/21/25 1208       Plan    Plan Home w/HH    Patient/Family in Agreement with Plan yes    Plan Comments CM spoke with patient, daughter, and niece at the bedside. Plans to discharge tomorrow if medically ready. Patient will discharge home with home health. Patient and family deny any discharge planning needs at this time. CM called Ami with [facility] to update on discharge plan, home health orders for SN, PT & OT. No further needs identified at this time. CM following    Final Discharge Disposition Code 06 - home with home health care                   Discharge Codes    No documentation.                       Alisha Alejandre RN

## 2025-07-21 NOTE — PROGRESS NOTES
[facility] Medicine Services  PROGRESS NOTE    Patient Name: Elisabeth Burger  : 1950  MRN: [id number]    Date of Admission: 2025  Primary Care Physician: Deonna Pagan PA    Subjective   Subjective     CC:  Lower extremity edema     HPI:  On baseline oxygen of 4 liters: daughters at bedside with all of their questions about stage of copd and medication reconciliation answered.    Objective   Objective     Vital Signs:   Temp:  [97.7 °F (36.5 °C)-98.8 °F (37.1 °C)] 97.7 °F (36.5 °C)  Heart Rate:  [] 90  Resp:  [18-36] 20  BP: ()/(55-74) 107/60  Flow (L/min) (Oxygen Therapy):  [4] 4     Physical Exam:  Constitutional: No acute distress, awake, alert frail, cachectic, chronically ill-appearing  HENT: NCAT, mucous membranes moist, edentulous  Respiratory: Diminished breath sounds globally.  Cardiovascular: RRR  Gastrointestinal: Positive bowel sounds, soft, nontender, nondistended  Musculoskeletal: No bilateral ankle edema  Psychiatric: Appropriate affect, cooperative  Neurologic: Alert, oriented, symmetric facies, moves all extremities, speech clear  Skin: No rashes    Results Reviewed:  LAB RESULTS:      Lab 25  04025   WBC 13.58* 8.30  --  9.97   HEMOGLOBIN 12.5 12.5  --  13.1   HEMATOCRIT 42.0 41.3  --  43.5   PLATELETS 241 221  --  232   NEUTROS ABS 10.83* 5.46  --  6.53   IMMATURE GRANS (ABS) 0.05 0.02  --  0.03   LYMPHS ABS 1.72 1.61  --  2.39   MONOS ABS 0.92* 0.70  --  0.64   EOS ABS 0.02 0.43*  --  0.31   MCV 97.0 94.5  --  94.0   HSTROP T  --   --  26* 27*         Lab 25  0913 25  1116 25  0841 25   SODIUM 131*  --  134* 135* 136  --  144 140   POTASSIUM 4.4 5.6* 5.7* 6.1* 4.8   < > 3.1* 2.8*   CHLORIDE 94*  --  97* 97* 90*  --  96* 97*   CO2 28.0  --  29.8* 31.6* 35.8*  --  38.8* 35.3*   ANION GAP 9.0  --  7.2 6.4  10.2  --  9.2 7.7   BUN 10.7  --  11.0 10.7 8.6  --  5.8* 6.2*   CREATININE 0.59  --  0.57 0.56* 0.62  --  0.56* 0.66   EGFR 93.5  --  94.3 94.7 92.4  --  94.7 91.0   GLUCOSE 75  --  63* 94 118*  --  153* 147*   CALCIUM 9.1  --  9.9 8.6 8.7  --  8.0* 8.6   MAGNESIUM  --   --   --   --  2.3  --   --  1.9   PHOSPHORUS  --   --   --   --  2.0*  --   --   --    HEMOGLOBIN A1C  --   --   --   --   --   --   --  5.50   TSH  --   --   --   --   --   --  1.150  --     < > = values in this interval not displayed.         Lab 07/20/25  0401 07/19/25  0841 07/18/25  0340 07/17/25 2016   TOTAL PROTEIN 6.5 6.5 5.8* 5.9*   ALBUMIN 3.4* 3.5 3.2* 3.5   GLOBULIN 3.1 3.0 2.6 2.4   ALT (SGPT) 34* 24 26 33   AST (SGOT) 39* 45* 30 40*   BILIRUBIN 0.2 0.2 0.2 0.2   ALK PHOS 91 91 89 96         Lab 07/17/25  2143 07/17/25 2016   PROBNP  --  2,586.0*   HSTROP T 26* 27*                 Lab 07/19/25 2018   PH, ARTERIAL 7.389   PCO2, ARTERIAL 70.5*   PO2 ART 65.3*   FIO2 36   HCO3 ART 42.5*   BASE EXCESS ART 14.3*   CARBOXYHEMOGLOBIN 1.6     Brief Urine Lab Results  (Last result in the past 365 days)        Color   Clarity   Blood   Leuk Est   Nitrite   Protein   CREAT   Urine HCG        07/17/25 2220 Yellow   Clear   Trace   Small (1+)   Negative   Negative                   Microbiology Results Abnormal       None            SLP FEES - Fiberoptic Endo Eval Swallow  Result Date: 7/20/2025  This procedure was auto-finalized with no dictation required.    XR Chest 1 View  Result Date: 7/20/2025  XR CHEST 1 VW Date of Exam: 7/20/2025 11:33 AM EDT Indication: hypoxia Comparison: 7/19/2025 CT Findings: Unchanged cardiomediastinal silhouette. Persistent bibasilar airspace opacities. Emphysema. No new focal airspace consolidation. No pleural effusion or pneumothorax. No acute bone abnormality.     Impression: Impression: Emphysema with persistent bibasilar airspace opacities. No new focal consolidation. Electronically Signed: Karthikeyan Mckeon MD   7/20/2025 12:15 PM EDT  Workstation ID: [id number]      Results for orders placed during the hospital encounter of 07/17/25    Adult Transthoracic Echo Complete With Contrast if Necessary Per Protocol 07/18/2025 11:02 AM    Interpretation Summary    Left ventricular systolic function is normal. Calculated left ventricular EF = 65%    Left ventricular diastolic function was normal.    The right ventricular cavity is moderately dilated.    The right atrial cavity is mildly dilated.      Current medications:  Scheduled Meds:apixaban, 5 mg, Oral, Q12H  budesonide-formoterol, 2 puff, Inhalation, BID - RT  cefTRIAXone, 1,000 mg, Intravenous, Q24H  ipratropium-albuterol, 3 mL, Nebulization, 4x Daily - RT  lactobacillus acidophilus, 1 capsule, Oral, Daily  melatonin, 5 mg, Oral, Nightly  methylPREDNISolone sodium succinate, 40 mg, Intravenous, Daily  nicotine, 1 patch, Transdermal, Q24H  pharmacy consult - MTM, , Not Applicable, Daily  senna-docusate sodium, 2 tablet, Oral, BID   And  polyethylene glycol, 17 g, Oral, Daily  sodium chloride, 10 mL, Intravenous, Q12H  thiamine, 100 mg, Oral, Daily      Continuous Infusions:       PRN Meds:.  acetaminophen **OR** acetaminophen **OR** acetaminophen    senna-docusate sodium **AND** polyethylene glycol **AND** bisacodyl **AND** bisacodyl    ipratropium-albuterol    nitroglycerin    ondansetron    Potassium Replacement - Follow Nurse / BPA Driven Protocol    sodium chloride    [COMPLETED] Insert Peripheral IV **AND** sodium chloride    sodium chloride    sodium chloride    Assessment & Plan   Assessment & Plan     Active Hospital Problems    Diagnosis  POA    **COPD with acute exacerbation [J44.1]  Yes    Rectal bleeding [K62.5]  Yes    Supplemental oxygen dependent [Z99.81]  Not Applicable    Chronic respiratory failure with hypoxia [J96.11]  Yes    Pulmonary nodule (7mm LLL incidental) [R91.1]  Yes    COPD (? Severity) [J44.9]  Yes    Severe malnutrition [E43]  Yes      Resolved  Hospital Problems   No resolved problems to display.        Brief Hospital Course to date:  Elisabeth Burger is a 74 y.o. female with hx of COPD with chronic respiratory failure on 4L NC, known pulmonary nodule, former tobacco use disorder, HFpEF, who presented for evaluation of LE edema & shortness of breath. Patient had been out of medications for several weeks prior to presentation. CT abdomen/pelvis showed anasarca, left inguinal hernia, diverticulosis, chronic L2 compression fracture.    Acute copd exacerbation   -Continue scheduled duonebs  -Continue Solumedrol/ICS/Symbicort/Spiriva  - Maintain sats between 88-92%  - Continuous pulse ox  - CT chest from yesterday showed Bilateral lower lobe infectious/inflammatory process with endobronchial component, possibly related to aspiration/Advanced emphysematous changes.   - will obtain speech eval. mild, oral dysphagia, pharyngeal dysphagia : appreciate dietary recommendations.  - respiratory culture: unremarkable  - Continue IV rocephin  - aspiration precautions        Hyperkalemia, with early EKG changes  - resolved  - discontinue lokelma    Hx of HFpEF  - imaging w anasarca with pulmonary edema and small ascites  - TTE 6/2023 with EF 56 to 60%; right ventricular systolic pressure from tricuspid regurgitation is normal (<35 mmHg); Left ventricular diastolic function is consistent with (grade I) impaired relaxation.   - repeat echo 7/18: EF of 65% with normal diastolic function    Chronic Respiratory Failure  Advanced COPD  - Known Pulmonary Nodule; 7 x 6 x 5 mm diameter medial left lower lobe pulmonary nodule seen on CT.   - on 4L NC at baseline.  - continue Symbicort + Spiriva    Rectal bleeding, resolved      Tobacco use disorder  - nicotine patch     Severe Malnutrition BMI 14  Failure to thrive  - likely related to advanced copd  - consulted nutrition  - PT/OT rec home         Expected Discharge Location and Transportation: home w  and 24/7 care   Expected  Discharge   Expected discharge date/ time has not been documented.     VTE Prophylaxis:  Pharmacologic & mechanical VTE prophylaxis orders are present.         AM-PAC 6 Clicks Score (PT): 19 (07/21/25 7223)    CODE STATUS:   Code Status and Medical Interventions: CPR (Attempt to Resuscitate); Full Support   Ordered at: 07/17/25 5506     Code Status (Patient has no pulse and is not breathing):    CPR (Attempt to Resuscitate)     Medical Interventions (Patient has pulse or is breathing):    Full Support     Level Of Support Discussed With:    Patient       Pat Edwards MD  07/21/25

## 2025-07-21 NOTE — PLAN OF CARE
Problem: Adult Inpatient Plan of Care  Goal: Plan of Care Review  Outcome: Progressing  Goal: Patient-Specific Goal (Individualized)  Outcome: Progressing  Goal: Absence of Hospital-Acquired Illness or Injury  Outcome: Progressing  Intervention: Identify and Manage Fall Risk  Recent Flowsheet Documentation  Taken 7/20/2025 1926 by Portillo Grant RN  Safety Promotion/Fall Prevention:   activity supervised   nonskid shoes/slippers when out of bed  Intervention: Prevent Skin Injury  Recent Flowsheet Documentation  Taken 7/20/2025 1926 by Portillo Grant RN  Body Position: sitting up in bed  Skin Protection: incontinence pads utilized  Goal: Optimal Comfort and Wellbeing  Outcome: Progressing  Intervention: Provide Person-Centered Care  Recent Flowsheet Documentation  Taken 7/20/2025 1926 by Portillo Grant RN  Trust Relationship/Rapport:   care explained   questions answered  Goal: Readiness for Transition of Care  Outcome: Progressing     Problem: Heart Failure  Goal: Optimal Coping  Outcome: Progressing  Intervention: Support Psychosocial Response  Recent Flowsheet Documentation  Taken 7/20/2025 1926 by Portillo Grant RN  Supportive Measures: active listening utilized  Goal: Optimal Cardiac Output and Blood Flow  Outcome: Progressing  Intervention: Optimize Cardiac Output  Recent Flowsheet Documentation  Taken 7/20/2025 1926 by Portillo Grant RN  Environmental Support: calm environment promoted  Goal: Stable Heart Rate and Rhythm  Outcome: Progressing  Goal: Fluid and Electrolyte Balance  Outcome: Progressing  Goal: Optimal Functional Ability  Outcome: Progressing  Intervention: Optimize Functional Ability  Recent Flowsheet Documentation  Taken 7/20/2025 1926 by Portillo Grant RN  Activity Management: activity encouraged  Goal: Improved Oral Intake  Outcome: Progressing  Goal: Effective Oxygenation and Ventilation  Outcome: Progressing  Intervention: Promote Airway Secretion Clearance  Recent  Flowsheet Documentation  Taken 7/20/2025 1926 by Portillo Grant RN  Activity Management: activity encouraged  Cough And Deep Breathing: done independently per patient  Intervention: Optimize Oxygenation and Ventilation  Recent Flowsheet Documentation  Taken 7/20/2025 1926 by Portillo Grant RN  Head of Bed (HOB) Positioning: HOB elevated  Goal: Effective Breathing Pattern During Sleep  Outcome: Progressing     Problem: Comorbidity Management  Goal: Maintenance of COPD Symptom Control  Outcome: Progressing  Goal: Maintenance of Heart Failure Symptom Control  Outcome: Progressing     Problem: Skin Injury Risk Increased  Goal: Skin Health and Integrity  Outcome: Progressing  Intervention: Optimize Skin Protection  Recent Flowsheet Documentation  Taken 7/20/2025 1926 by Portillo Grant RN  Activity Management: activity encouraged  Pressure Reduction Techniques: pressure points protected  Head of Bed (HOB) Positioning: HOB elevated  Pressure Reduction Devices: foam padding utilized  Skin Protection: incontinence pads utilized     Problem: Fall Injury Risk  Goal: Absence of Fall and Fall-Related Injury  Outcome: Progressing  Intervention: Promote Injury-Free Environment  Recent Flowsheet Documentation  Taken 7/20/2025 1926 by Portillo Grant RN  Safety Promotion/Fall Prevention:   activity supervised   nonskid shoes/slippers when out of bed     Problem: Noninvasive Ventilation Acute  Goal: Effective Unassisted Ventilation and Oxygenation  Outcome: Progressing   Goal Outcome Evaluation:

## 2025-07-21 NOTE — PLAN OF CARE
Goal Outcome Evaluation:  Plan of Care Reviewed With: patient        Progress: no change  Outcome Evaluation: Pt min A LBD,  min A toilet transfer,  CGA to ambulate with FWW.  Pt with good effort, but continues below baseline with ADLs d/t decr safety awareness, weakness, decr balance and activity tolerance.  Recommend home with 24/7 assist and HHOT upon d/c    Anticipated Discharge Disposition (OT): home with 24/7 care, home with home health

## 2025-07-21 NOTE — PLAN OF CARE
Goal Outcome Evaluation:  Plan of Care Reviewed With: patient, family        Progress: no change  Outcome Evaluation: Patient required additional time to complete mobility. Patient limited in ambulation distance by need to use the bathroom. Patient demonstrates narrow base of support and ambulating on forefoot of BL feet. Patient presents below baseline for mobility and at increased risk for falls. The patient would continue to benefit from skilled PT to address strength, balance and activity tolerance deficits. Recommend RW at hospital discharge.    Anticipated Discharge Disposition (PT): home with 24/7 care, home with home health

## 2025-07-21 NOTE — THERAPY TREATMENT NOTE
Patient Name: Elisabeth Burger  : 1950    MRN: [id number]                              Today's Date: 2025       Admit Date: 2025    Visit Dx:     ICD-10-CM ICD-9-CM   1. Acute on chronic congestive heart failure, unspecified heart failure type  I50.9 428.0   2. Pulmonary congestion  R09.89 514   3. Peripheral edema  R60.0 782.3   4. Hypoxia  R09.02 799.02   5. Acute hypokalemia  E87.6 276.8   6. Anal bleeding  K62.5 569.3   7. Oropharyngeal dysphagia  R13.12 787.22   8. Tobacco use  Z72.0 305.1   9. Severe malnutrition  E43 261   10. COPD (? Severity)  J44.9 496   11. Pulmonary nodule (7mm LLL incidental)  R91.1 793.11   12. Chronic respiratory failure with hypoxia  J96.11 518.83     799.02   13. Supplemental oxygen dependent  Z99.81 V46.2     Patient Active Problem List   Diagnosis    Tobacco use    Severe malnutrition    COPD (? Severity)    Pulmonary nodule (7mm LLL incidental)    Chronic respiratory failure with hypoxia    Supplemental oxygen dependent    COPD with acute exacerbation    Rectal bleeding     Past Medical History:   Diagnosis Date    COPD (chronic obstructive pulmonary disease)      History reviewed. No pertinent surgical history.   General Information       Row Name 25 1528          Physical Therapy Time and Intention    Document Type therapy note (daily note)  -     Mode of Treatment physical therapy  -       Row Name 25 1528          General Information    Patient Profile Reviewed yes  -ML     Existing Precautions/Restrictions fall;oxygen therapy device and L/min  -ML     Barriers to Rehab medically complex;previous functional deficit;cognitive status;hearing deficit  -ML       Row Name 25 1528          Cognition    Orientation Status (Cognition) oriented to;person;place;disoriented to;time  -ML       Row Name 25 1528          Safety Issues/Impairments Affecting Functional Mobility    Safety Issues Affecting Function (Mobility) awareness of need for  assistance;insight into deficits/self-awareness;positioning of assistive device;safety precaution awareness;safety precautions follow-through/compliance;sequencing abilities  -ML     Impairments Affecting Function (Mobility) balance;cognition;endurance/activity tolerance;strength;pain  -ML     Cognitive Impairments, Mobility Safety/Performance awareness, need for assistance;insight into deficits/self-awareness;safety precaution awareness;safety precaution follow-through;sequencing abilities  -ML               User Key  (r) = Recorded By, (t) = Taken By, (c) = Cosigned By      Initials Name Provider Type    Krystal Eddy Physical Therapist                   Mobility       Row Name 07/21/25 1528          Bed Mobility    Bed Mobility supine-sit;sit-supine  -ML     Supine-Sit [place] (Bed Mobility) standby assist  -ML     Assistive Device (Bed Mobility) bed rails;head of bed elevated  -ML       Row Name 07/21/25 1528          Sit-Stand Transfer    Sit-Stand [place] (Transfers) minimum assist (75% patient effort);1 person assist;verbal cues  -ML     Assistive Device (Sit-Stand Transfers) walker, front-wheeled  -ML       Row Name 07/21/25 1528          Gait/Stairs (Locomotion)    [place] Level (Gait) contact guard;verbal cues  -ML     Assistive Device (Gait) walker, front-wheeled  -ML     Distance in Feet (Gait) 22  + 6  -ML     Deviations/Abnormal Patterns (Gait) base of support, narrow;lorna decreased;gait speed decreased;steppage  -ML     Bilateral Gait Deviations forward flexed posture  and downward gaze, patient ambulates on BL forefoot  -ML     Comment, (Gait/Stairs) Patient limited in ambulation distance by need to use bathroom.  -ML               User Key  (r) = Recorded By, (t) = Taken By, (c) = Cosigned By      Initials Name Provider Type    Krystal Eddy Physical Therapist                   Obj/Interventions       Row Name 07/21/25 1530          Balance    Balance Assessment sitting static  balance;sitting dynamic balance;standing static balance;standing dynamic balance  -ML     Static Sitting Balance standby assist  -ML     Dynamic Sitting Balance contact guard  -ML     Position, Sitting Balance unsupported;sitting edge of bed;other (see comments)  sitting on BSC  -ML     Static Standing Balance contact guard  -ML     Dynamic Standing Balance contact guard;verbal cues  -ML     Position/Device Used, Standing Balance supported;walker, front-wheeled  -ML     Balance Interventions sitting;standing;sit to stand;supported;occupation based/functional task  -ML               User Key  (r) = Recorded By, (t) = Taken By, (c) = Cosigned By      Initials Name Provider Type    ML Krystal Sarkar Physical Therapist                   Goals/Plan    No documentation.                  Clinical Impression       Row Name 07/21/25 1531          Pain    Pain Location foot  -ML     Pain Side/Orientation bilateral  -ML     Pain Management Interventions positioning techniques utilized;exercise or physical activity utilized  -ML     Response to Pain Interventions no change per patient report  -ML     Additional Documentation Pain Scale: FACES Pre/Post-Treatment (Group)  -ML       Row Name 07/21/25 1531          Pain Scale: FACES Pre/Post-Treatment    Pain: FACES Scale, Pretreatment 4-->hurts little more  -ML     Posttreatment Pain Rating 4-->hurts little more  -ML       Row Name 07/21/25 1531          Plan of Care Review    Plan of Care Reviewed With patient;family  -ML     Progress no change  -ML     Outcome Evaluation Patient required additional time to complete mobility. Patient limited in ambulation distance by need to use the bathrom. Patient demonstates narrow base of support and ambulating on forefoot of BL feet. Patient presents below baseline for mobility and at increased risk for falls. The patient would continue to benefit from skilled PT to address strength, balance and activity tolerance deficits. Recommend RW at  hospital discharge.  -ML       Row Name 07/21/25 1531          Vital Signs    Pre SpO2 (%) 91  -ML     O2 Delivery Pre Treatment nasal cannula  -ML     Post SpO2 (%) 91  -ML     O2 Delivery Post Treatment nasal cannula  -ML     Pre Patient Position Supine  -ML     Intra Patient Position Standing  -ML     Post Patient Position Supine  -ML       Row Name 07/21/25 1531          Positioning and Restraints    Pre-Treatment Position in bed  -ML     Post Treatment Position bed  -ML     In Bed notified nsg;fowlers;call light within reach;encouraged to call for assist;exit alarm on;side rails up x2;with family/caregiver  -               User Key  (r) = Recorded By, (t) = Taken By, (c) = Cosigned By      Initials Name Provider Type    Krystal Eddy Physical Therapist                   Outcome Measures       Row Name 07/21/25 1533 07/21/25 0816       How much help from another person do you currently need...    Turning from your back to your side while in flat bed without using bedrails? 4  -ML 4  -LV    Moving from lying on back to sitting on the side of a flat bed without bedrails? 4  -ML 4  -LV    Moving to and from a bed to a chair (including a wheelchair)? 3  -ML 3  -LV    Standing up from a chair using your arms (e.g., wheelchair, bedside chair)? 3  -ML 3  -LV    Climbing 3-5 steps with a railing? 2  -ML 3  -LV    To walk in hospital room? 3  -ML 3  -LV    AM-PAC 6 Clicks Score (PT) 19  -ML 20  -LV    Highest Level of Mobility Goal Walk 10 Steps or More-6  -ML Walk 10 Steps or More-6  -LV      Row Name 07/21/25 1533 07/21/25 1030       Functional Assessment    Outcome Measure Options AM-PAC 6 Clicks Basic Mobility (PT)  -ML AM-PAC 6 Clicks Daily Activity (OT)  -AC              User Key  (r) = Recorded By, (t) = Taken By, (c) = Cosigned By      Initials Name Provider Type    AC Penny Teresa, OT Occupational Therapist     Krystal Sarkar Physical Therapist     Heather Rico, RN Registered Nurse                                  Physical Therapy Education       Title: PT OT SLP Therapies (In Progress)       Topic: Physical Therapy (In Progress)       Point: Mobility training (In Progress)       Learning Progress Summary            Patient Acceptance, E, NR by  at 7/21/2025 1533    Acceptance, E, NR by MB at 7/18/2025 1035    Acceptance, E,TB, VU by  at 7/18/2025 0121   Family Acceptance, E, NR by  at 7/21/2025 1533    Acceptance, E, NR by MB at 7/18/2025 1035    Acceptance, E,TB, VU by  at 7/18/2025 0121                      Point: Home exercise program (Done)       Learning Progress Summary            Patient Acceptance, E,TB, VU by  at 7/18/2025 0121   Family Acceptance, E,TB, VU by  at 7/18/2025 0121                      Point: Body mechanics (In Progress)       Learning Progress Summary            Patient Acceptance, E, NR by  at 7/21/2025 1533    Acceptance, E, NR by MB at 7/18/2025 1035    Acceptance, E,TB, VU by  at 7/18/2025 0121   Family Acceptance, E, NR by  at 7/21/2025 1533    Acceptance, E, NR by MB at 7/18/2025 1035    Acceptance, E,TB, VU by  at 7/18/2025 0121                      Point: Precautions (In Progress)       Learning Progress Summary            Patient Acceptance, E, NR by  at 7/21/2025 1533    Acceptance, E, NR by MB at 7/18/2025 1035    Acceptance, E,TB, VU by  at 7/18/2025 0121   Family Acceptance, E, NR by  at 7/21/2025 1533    Acceptance, E, NR by MB at 7/18/2025 1035    Acceptance, E,TB, VU by  at 7/18/2025 0121                                      User Key       Initials Effective Dates Name Provider Type Discipline     04/22/21 -  Krystal Sarkar Physical Therapist PT    MB 05/30/25 -  Svetlana Ayala PT Physical Therapist PT     06/12/25 -  Timmy Mayes, RN Registered Nurse Nurse                  PT Recommendation and Plan     Progress: no change  Outcome Evaluation: Patient required additional time to complete mobility. Patient limited in ambulation  distance by need to use the bathrom. Patient demonstates narrow base of support and ambulating on forefoot of BL feet. Patient presents below baseline for mobility and at increased risk for falls. The patient would continue to benefit from skilled PT to address strength, balance and activity tolerance deficits. Recommend RW at hospital discharge.     Time Calculation:         PT Charges       Row Name 07/21/25 1533             Time Calculation    Start Time 1453  -ML      PT Received On 07/21/25  -ML         Timed Charges    81560 - PT Therapeutic Activity Minutes 30  -ML         Total Minutes    Timed Charges Total Minutes 30  -ML       Total Minutes 30  -ML                User Key  (r) = Recorded By, (t) = Taken By, (c) = Cosigned By      Initials Name Provider Type    Krystal Eddy Physical Therapist                  Therapy Charges for Today       Code Description Service Date Service Provider Modifiers Qty    83511603825 HC PT THERAPEUTIC ACT EA 15 MIN 7/21/2025 Krystal Sarkar GP 2            PT G-Codes  Outcome Measure Options: AM-PAC 6 Clicks Basic Mobility (PT)  AM-PAC 6 Clicks Score (PT): 19  AM-PAC 6 Clicks Score (OT): 19  PT Discharge Summary  Anticipated Discharge Disposition (PT): home with 24/7 care, home with home health    Krystal Sarkar  7/21/2025

## 2025-07-21 NOTE — THERAPY TREATMENT NOTE
Patient Name: Elisabeth Burger  : 1950    MRN: [id number]                              Today's Date: 2025       Admit Date: 2025    Visit Dx:     ICD-10-CM ICD-9-CM   1. Acute on chronic congestive heart failure, unspecified heart failure type  I50.9 428.0   2. Pulmonary congestion  R09.89 514   3. Peripheral edema  R60.0 782.3   4. Hypoxia  R09.02 799.02   5. Acute hypokalemia  E87.6 276.8   6. Anal bleeding  K62.5 569.3   7. Oropharyngeal dysphagia  R13.12 787.22     Patient Active Problem List   Diagnosis    Tobacco use    Severe malnutrition    COPD (? Severity)    Pulmonary nodule (7mm LLL incidental)    Chronic respiratory failure with hypoxia    Supplemental oxygen dependent    COPD with acute exacerbation    Rectal bleeding     Past Medical History:   Diagnosis Date    COPD (chronic obstructive pulmonary disease)      History reviewed. No pertinent surgical history.   General Information       Row Name 25 1021          OT Time and Intention    Document Type therapy note (daily note)  -     Mode of Treatment occupational therapy  -       Row Name 25 1021          General Information    Patient Profile Reviewed yes  -AC     Existing Precautions/Restrictions fall;oxygen therapy device and L/min  -     Barriers to Rehab medically complex;previous functional deficit;cognitive status;hearing deficit  -       Row Name 25 1021          Cognition    Orientation Status (Cognition) oriented to;person;place;disoriented to;time  -       Row Name 25 1021          Safety Issues/Impairments Affecting Functional Mobility    Safety Issues Affecting Function (Mobility) awareness of need for assistance;insight into deficits/self-awareness;safety precaution awareness;safety precautions follow-through/compliance  -     Impairments Affecting Function (Mobility) balance;cognition;endurance/activity tolerance;strength  -AC     Cognitive Impairments, Mobility Safety/Performance  awareness, need for assistance;insight into deficits/self-awareness;judgment;safety precaution awareness;safety precaution follow-through  -               User Key  (r) = Recorded By, (t) = Taken By, (c) = Cosigned By      Initials Name Provider Type     Penny Tersea, OT Occupational Therapist                     Mobility/ADL's       Row Name 07/21/25 1022          Bed Mobility    Bed Mobility supine-sit;sit-supine  -     Supine-Sit [place] (Bed Mobility) standby assist  -     Sit-Supine [place] (Bed Mobility) standby assist  -     Assistive Device (Bed Mobility) bed rails;head of bed elevated  -       Row Name 07/21/25 1022          Transfers    Transfers sit-stand transfer;toilet transfer  -       Row Name 07/21/25 1022          Sit-Stand Transfer    Sit-Stand [place] (Transfers) contact guard  -     Assistive Device (Sit-Stand Transfers) walker, front-wheeled  -     Comment, (Sit-Stand Transfer) VCs for hand placement  -       Row Name 07/21/25 1022          Toilet Transfer    [place] Level (Toilet Transfer) verbal cues;minimum assist (75% patient effort)  -     Assistive Device (Toilet Transfer) commode;grab bars/safety frame  -       Row Name 07/21/25 1022          Functional Mobility    Functional Mobility- Ind. Level verbal cues required;contact guard assist  -     Functional Mobility- Device walker, front-wheeled  -     Functional Mobility-Distance (Feet) --   distance  -     Functional Mobility- Safety Issues step length decreased  -     Functional Mobility- Comment VCs to keep feet in walker  -       Row Name 07/21/25 1022          Activities of Daily Living    BADL Assessment/Intervention lower body dressing;toileting  -       Row Name 07/21/25 1022          Lower Body Dressing Assessment/Training    [place] Level (Lower Body Dressing) doff;don;socks;minimum assist (75% patient effort);shoes/slippers  -     Position (Lower Body Dressing)  edge of bed sitting;unsupported sitting  -AC       Row Name 07/21/25 1022          Toileting Assessment/Training    [place] Level (Toileting) perform perineal hygiene;moderate assist (50% patient effort)  -AC     Position (Toileting) unsupported sitting;supported standing  -AC               User Key  (r) = Recorded By, (t) = Taken By, (c) = Cosigned By      Initials Name Provider Type    Penny Ruiz, OT Occupational Therapist                   Obj/Interventions       Row Name 07/21/25 1026          Balance    Balance Assessment sitting static balance;sitting dynamic balance;standing static balance;standing dynamic balance  -AC     Static Sitting Balance standby assist  -AC     Dynamic Sitting Balance contact guard  -AC     Position, Sitting Balance unsupported;sitting edge of bed  -AC     Static Standing Balance verbal cues;contact guard  -AC     Dynamic Standing Balance verbal cues;contact guard  -AC     Position/Device Used, Standing Balance walker, rolling  -AC               User Key  (r) = Recorded By, (t) = Taken By, (c) = Cosigned By      Initials Name Provider Type    Penny Ruiz, OT Occupational Therapist                   Goals/Plan    No documentation.                  Clinical Impression       Row Name 07/21/25 1026          Pain Assessment    Pretreatment Pain Rating 0/10 - no pain  -AC     Posttreatment Pain Rating 0/10 - no pain  -AC       Row Name 07/21/25 1026          Plan of Care Review    Plan of Care Reviewed With patient  -AC     Progress no change  -AC     Outcome Evaluation Pt min A LBD,  min A toilet transfer,  CGA to ambulate with FWW.  Pt with good effort, but continues below baseline with ADLs d/t decr safety awareness, weakness, decr balance and activity tolerance.  Recommend home with 24/7 assist and HHOT upon d/c  -AC       Row Name 07/21/25 1026          Vital Signs    Pre SpO2 (%) 91  -AC     O2 Delivery Pre Treatment supplemental O2  -AC     Intra SpO2 (%) 88  -AC      O2 Delivery Intra Treatment supplemental O2  -AC     Post SpO2 (%) 90  -AC     O2 Delivery Post Treatment supplemental O2  -AC     Pre Patient Position Supine  -AC     Post Patient Position Supine  -AC       Row Name 07/21/25 1026          Positioning and Restraints    Pre-Treatment Position in bed  -AC     Post Treatment Position bed  -AC     In Bed notified nsg;fowlers;call light within reach;encouraged to call for assist;exit alarm on;with family/caregiver  -               User Key  (r) = Recorded By, (t) = Taken By, (c) = Cosigned By      Initials Name Provider Type    Penny Ruiz, OT Occupational Therapist                   Outcome Measures       Row Name 07/21/25 1030          How much help from another is currently needed...    Putting on and taking off regular lower body clothing? 3  -AC     Bathing (including washing, rinsing, and drying) 3  -AC     Toileting (which includes using toilet bed pan or urinal) 3  -AC     Putting on and taking off regular upper body clothing 3  -AC     Taking care of personal grooming (such as brushing teeth) 3  -AC     Eating meals 4  -AC     AM-PAC 6 Clicks Score (OT) 19  -AC       Row Name 07/21/25 0816          How much help from another person do you currently need...    Turning from your back to your side while in flat bed without using bedrails? 4  -LV     Moving from lying on back to sitting on the side of a flat bed without bedrails? 4  -LV     Moving to and from a bed to a chair (including a wheelchair)? 3  -LV     Standing up from a chair using your arms (e.g., wheelchair, bedside chair)? 3  -LV     Climbing 3-5 steps with a railing? 3  -LV     To walk in hospital room? 3  -LV     AM-PAC 6 Clicks Score (PT) 20  -LV     Highest Level of Mobility Goal Walk 10 Steps or More-6  -LV       Row Name 07/21/25 1030          Functional Assessment    Outcome Measure Options AM-PAC 6 Clicks Daily Activity (OT)  -               User Key  (r) = Recorded By, (t) = Taken  By, (c) = Cosigned By      Initials Name Provider Type     Penny Teresa, OT Occupational Therapist    Heather Purdy, RN Registered Nurse                    Occupational Therapy Education       Title: PT OT SLP Therapies (In Progress)       Topic: Occupational Therapy (In Progress)       Point: ADL training (In Progress)       Learning Progress Summary            Patient Acceptance, E, NR by  at 7/21/2025 1030    Acceptance, E, NR by  at 7/18/2025 1113                                      User Key       Initials Effective Dates Name Provider Type Discipline     02/03/23 -  Penny Teresa, OT Occupational Therapist OT                  OT Recommendation and Plan  Planned Therapy Interventions (OT): activity tolerance training, BADL retraining, functional balance retraining, occupation/activity based interventions, patient/caregiver education/training, strengthening exercise, transfer/mobility retraining  Therapy Frequency (OT): daily  Plan of Care Review  Plan of Care Reviewed With: patient  Progress: no change  Outcome Evaluation: Pt min A LBD,  min A toilet transfer,  CGA to ambulate with FWW.  Pt with good effort, but continues below baseline with ADLs d/t decr safety awareness, weakness, decr balance and activity tolerance.  Recommend home with 24/7 assist and HHOT upon d/c     Time Calculation:   Evaluation Complexity (OT)  Review Occupational Profile/Medical/Therapy History Complexity: expanded/moderate complexity  Assessment, Occupational Performance/Identification of Deficit Complexity: 3-5 performance deficits  Clinical Decision Making Complexity (OT): detailed assessment/moderate complexity  Overall Complexity of Evaluation (OT): moderate complexity     Time Calculation- OT       Row Name 07/21/25 0830             Time Calculation- OT    OT Start Time 0830  -      OT Received On 07/21/25  -      OT Goal Re-Cert Due Date 07/28/25  -         Timed Charges    04832 - OT Therapeutic  Activity Minutes 18  -AC      12694 - OT Self Care/Mgmt Minutes 20  -AC         Total Minutes    Timed Charges Total Minutes 38  -AC       Total Minutes 38  -AC                User Key  (r) = Recorded By, (t) = Taken By, (c) = Cosigned By      Initials Name Provider Type    AC Penny Teresa, OT Occupational Therapist                  Therapy Charges for Today       Code Description Service Date Service Provider Modifiers Qty    [id number]  OT THERAPEUTIC ACT EA 15 MIN 7/21/2025 Penyn Teresa, OT GO 1    [id number]  OT SELF CARE/MGMT/TRAIN EA 15 MIN 7/21/2025 Penny Teresa OT GO 2                 Penny Teresa OT  7/21/2025

## 2025-07-22 ENCOUNTER — READMISSION MANAGEMENT (OUTPATIENT)
Dept: CALL CENTER | Facility: HOSPITAL | Age: 75
End: 2025-07-22
Payer: MEDICARE

## 2025-07-22 VITALS
HEART RATE: 89 BPM | HEIGHT: 67 IN | DIASTOLIC BLOOD PRESSURE: 65 MMHG | OXYGEN SATURATION: 93 % | RESPIRATION RATE: 18 BRPM | WEIGHT: 106.1 LBS | BODY MASS INDEX: 16.65 KG/M2 | TEMPERATURE: 97.6 F | SYSTOLIC BLOOD PRESSURE: 105 MMHG

## 2025-07-22 LAB — POTASSIUM SERPL-SCNC: 4.6 MMOL/L (ref 3.5–5.2)

## 2025-07-22 PROCEDURE — 94799 UNLISTED PULMONARY SVC/PX: CPT

## 2025-07-22 PROCEDURE — 94664 DEMO&/EVAL PT USE INHALER: CPT

## 2025-07-22 PROCEDURE — 25010000002 METHYLPREDNISOLONE PER 40 MG: Performed by: STUDENT IN AN ORGANIZED HEALTH CARE EDUCATION/TRAINING PROGRAM

## 2025-07-22 PROCEDURE — 99239 HOSP IP/OBS DSCHRG MGMT >30: CPT | Performed by: PHYSICIAN ASSISTANT

## 2025-07-22 PROCEDURE — 84132 ASSAY OF SERUM POTASSIUM: CPT | Performed by: STUDENT IN AN ORGANIZED HEALTH CARE EDUCATION/TRAINING PROGRAM

## 2025-07-22 RX ORDER — PREDNISONE 10 MG/1
TABLET ORAL
Qty: 30 TABLET | Refills: 0 | Status: SHIPPED | OUTPATIENT
Start: 2025-07-22 | End: 2025-08-03

## 2025-07-22 RX ORDER — CEFUROXIME AXETIL 500 MG/1
500 TABLET ORAL 2 TIMES DAILY
Qty: 6 TABLET | Refills: 0 | Status: SHIPPED | OUTPATIENT
Start: 2025-07-22 | End: 2025-07-25

## 2025-07-22 RX ORDER — LANOLIN ALCOHOL/MO/W.PET/CERES
100 CREAM (GRAM) TOPICAL DAILY
Qty: 90 TABLET | Refills: 3 | Status: SHIPPED | OUTPATIENT
Start: 2025-07-23

## 2025-07-22 RX ORDER — AMOXICILLIN 250 MG
2 CAPSULE ORAL DAILY
Qty: 120 TABLET | Refills: 11 | Status: SHIPPED | OUTPATIENT
Start: 2025-07-22

## 2025-07-22 RX ORDER — NICOTINE 21 MG/24HR
1 PATCH, TRANSDERMAL 24 HOURS TRANSDERMAL
Qty: 28 EACH | Refills: 2 | Status: SHIPPED | OUTPATIENT
Start: 2025-07-23 | End: 2025-07-28

## 2025-07-22 RX ORDER — IPRATROPIUM BROMIDE AND ALBUTEROL SULFATE 2.5; .5 MG/3ML; MG/3ML
3 SOLUTION RESPIRATORY (INHALATION) EVERY 4 HOURS PRN
Qty: 180 ML | Refills: 11 | Status: SHIPPED | OUTPATIENT
Start: 2025-07-22

## 2025-07-22 RX ORDER — DOXYCYCLINE 100 MG/1
100 CAPSULE ORAL 2 TIMES DAILY
Qty: 10 CAPSULE | Refills: 0 | Status: SHIPPED | OUTPATIENT
Start: 2025-07-22 | End: 2025-07-28

## 2025-07-22 RX ORDER — POLYETHYLENE GLYCOL 3350 17 G/17G
17 POWDER, FOR SOLUTION ORAL DAILY
Qty: 510 G | Refills: 11 | Status: SHIPPED | OUTPATIENT
Start: 2025-07-22

## 2025-07-22 RX ADMIN — Medication 10 ML: at 09:11

## 2025-07-22 RX ADMIN — Medication 1 CAPSULE: at 09:10

## 2025-07-22 RX ADMIN — IPRATROPIUM BROMIDE AND ALBUTEROL SULFATE 3 ML: 2.5; .5 SOLUTION RESPIRATORY (INHALATION) at 09:17

## 2025-07-22 RX ADMIN — METHYLPREDNISOLONE SODIUM SUCCINATE 40 MG: 40 INJECTION, POWDER, FOR SOLUTION INTRAMUSCULAR; INTRAVENOUS at 09:10

## 2025-07-22 RX ADMIN — NICOTINE 1 PATCH: 21 PATCH, EXTENDED RELEASE TRANSDERMAL at 09:11

## 2025-07-22 RX ADMIN — BUDESONIDE AND FORMOTEROL FUMARATE DIHYDRATE 2 PUFF: 160; 4.5 AEROSOL RESPIRATORY (INHALATION) at 09:17

## 2025-07-22 RX ADMIN — APIXABAN 5 MG: 5 TABLET, FILM COATED ORAL at 09:10

## 2025-07-22 RX ADMIN — Medication 100 MG: at 09:10

## 2025-07-22 NOTE — OUTREACH NOTE
Prep Survey      Flowsheet Row Responses   [facility] facility patient discharged from? [facility]   Is LACE score < 7 ? No   Eligibility Not Eligible   What are the reasons patient is not eligible? Hospice/Pallative Care   Does the patient have one of the following disease processes/diagnoses(primary or secondary)? COPD   Prep survey completed? Yes            TOAN MOREL - Registered Nurse

## 2025-07-22 NOTE — PLAN OF CARE
Problem: Adult Inpatient Plan of Care  Goal: Absence of Hospital-Acquired Illness or Injury  Outcome: Met  Intervention: Identify and Manage Fall Risk  Recent Flowsheet Documentation  Taken 7/22/2025 0855 by Heather Rico RN  Safety Promotion/Fall Prevention:   activity supervised   assistive device/personal items within reach   clutter free environment maintained   nonskid shoes/slippers when out of bed   room organization consistent   safety round/check completed  Intervention: Prevent Skin Injury  Recent Flowsheet Documentation  Taken 7/22/2025 0855 by Heather Rico RN  Body Position:   turned   right  Skin Protection:   incontinence pads utilized   pulse oximeter probe site changed   silicone foam dressing in place   skin sealant/moisture barrier applied   transparent dressing maintained  Intervention: Prevent and Manage VTE (Venous Thromboembolism) Risk  Recent Flowsheet Documentation  Taken 7/22/2025 0855 by Heather Rico, CHRIS  VTE Prevention/Management: other (see comments)  Intervention: Prevent Infection  Recent Flowsheet Documentation  Taken 7/22/2025 0855 by Heather Rico, RN  Infection Prevention:   environmental surveillance performed   hand hygiene promoted   Goal Outcome Evaluation:
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
## 2025-07-22 NOTE — DISCHARGE PLACEMENT REQUEST
"Elisabeth Burger (74 y.o. Female)    7/22 Dr. Lulu Mckeon, RN  Note: Patient has a d/c plan to go home with home health today. Consult for hospice information only. Liaison to reach out to family.    Date of Birth   1950    Social Security Number       Address   [address] [place] [address]    Home Phone   [phone]    MRN   [id number]       Mosque   None    Marital Status                               Admission Date   7/17/2025    Admission Type   Emergency    Admitting Provider   Pat Edwards MD    Attending Provider   Pat Edwards MD    Department, Room/Bed   [address], S557/1       Discharge Date       Discharge Disposition   Home or Self Care    Discharge Destination                                 Attending Provider: Pat Edwards MD    Allergies: No Known Allergies    Isolation: None   Infection: None   Code Status: CPR    Ht: 170.2 cm (67.01\")   Wt: 48.1 kg (106 lb 1.6 oz)    Admission Cmt: None   Principal Problem: COPD with acute exacerbation [J44.1]                   Active Insurance as of 7/17/2025       Primary Coverage       Payor Plan Insurance Group Employer/Plan Group    ANTHEM MEDICARE REPLACEMENT ANTHEM MEDICARE ADVANTAGE HMO [id number]       Payor Plan Address Payor Plan Phone Number Payor Plan Fax Number Effective Dates    [address] [phone]  1/1/2025 - None Entered    [facility] [address]         Subscriber Name Subscriber Birth Date Member ID       ELISABETH BURGER FAY 1950 [id number]               Secondary Coverage       Payor Plan Insurance Group Employer/Plan Group    [place]        Payor Plan Address Payor Plan Phone Number Payor Plan Fax Number Effective Dates    [address] [phone]  6/19/2023 - None Entered    [facility] [id number]         Subscriber Name Subscriber Birth Date Member ID       ELISABETH BURGER FAY 1950 [id number]                     Emergency Contacts "        (Rel.) Home Phone Work Phone Mobile Phone    CYNDI BURGER (Son) [phone] -- [phone]    EUNICE SY (Daughter)  --     (unknown),Leigh (niece) (Relative) [phone] -- --              Emergency Contact Information       Name Relation Home Work Mobile    CYNDI BURGER Son [phone]    EUNICE SY Daughter [phone]          Other Contacts       Name Relation Home Work Mobile    (unknown),Leigh (niece) Relative [phone]            Insurance Information                  [facility] MEDICARE REPLACEMENT/[facility] MEDICARE ADVANTAGE HMO Phone: [phone]    Subscriber: Elisabeth Burger Subscriber#: [id number]    Group#: [id number] Precert#: [id number]    Authorization#: -- Effective Date: --        [place] Phone: [phone]    Subscriber: Elisabeth Burgery Subscriber#: [id number]    Group#: -- Precert#: [id number]    Authorization#: [id number] Effective Date: --             History & Physical        Tona Ordaz PA-C at 25 2303       Attestation signed by Odalys Su MD at 25 0602      I have reviewed this documentation and agree.  The patient was seen independently by the APC.  I was available for any questions or concerns.                         [facility] Medicine Services  HISTORY AND PHYSICAL    Patient Name: Elisabeth Burger  : 1950  MRN: [id number]  Primary Care Physician: Provider, No Known  Date of admission: 2025    Subjective  Subjective     Chief Complaint:  Lower extremity edema    HPI:  Elisabeth Burger is a 74 y.o. female with a past medical history significant for COPD with chronic respiratory failure on 4L NC, known pulmonary nodule, former tobacco abuse, and malnutrition. Patient was also apparently prescribed Eliquis and Lasix since 2024 for unknown reason. Daughter at bedside states patient has been out of meds  for several weeks. Comes in today with complaints of progressively worsening SOB and bilateral lower extremity edema going on for the past week. Daughter at bedside also reports rectal bleeding with bowel movements. Apparently there is an area of excoriation around anus. Now here for further evaluation and treatment.  Currently denies fever, cough, congestion, syncope or chest pain. No abdominal pain or N/v/D. No headache or focal weakness/ paresthesias.         Review of Systems   Constitutional:  Positive for fatigue. Negative for chills.   HENT:  Negative for congestion and trouble swallowing.    Eyes:  Negative for discharge and itching.   Respiratory:  Positive for shortness of breath. Negative for cough.    Cardiovascular:  Positive for leg swelling. Negative for chest pain.   Gastrointestinal:  Negative for abdominal pain, diarrhea, nausea and vomiting.   Endocrine: Negative for cold intolerance and heat intolerance.   Genitourinary:  Negative for dysuria and flank pain.   Musculoskeletal:  Negative for gait problem and joint swelling.   Skin:  Negative for pallor and rash.   Allergic/Immunologic: Negative for immunocompromised state.   Neurological:  Positive for weakness. Negative for dizziness and headaches.   Hematological:  Negative for adenopathy.   Psychiatric/Behavioral:  Negative for agitation and confusion.             Personal History     Past Medical History:   Diagnosis Date    COPD (chronic obstructive pulmonary disease)            History reviewed. No pertinent surgical history.    Family History:  family history includes Stroke in her father and mother.     Social History:  reports that she quit smoking about 2 years ago. Her smoking use included cigarettes. She started smoking about 52 years ago. She has a 25 pack-year smoking history. She has been exposed to tobacco smoke. She has never used smokeless tobacco. She reports that she does not drink alcohol and does not use drugs.  Social  History     Social History Narrative    Hasn't smoked since her hospitalization 6/2023       Medications:  albuterol, budesonide-formoterol, nicotine, and tiotropium bromide monohydrate    No Known Allergies    Objective  Objective     Vital Signs:   Temp:  [98.7 °F (37.1 °C)] 98.7 °F (37.1 °C)  Heart Rate:  [69-99] 69  Resp:  [26] 26  BP: ()/(53-68) 102/57    Physical Exam   Constitutional: Awake, alert  Eyes: PERRLA, sclerae anicteric, no conjunctival injection  HENT: NCAT, mucous membranes moist  Neck: Supple, no thyromegaly, no lymphadenopathy, trachea midline  Respiratory: Clear to auscultation bilaterally, nonlabored respirations   Cardiovascular: RRR, no murmurs, rubs, or gallops, palpable pedal pulses bilaterally  Gastrointestinal: Positive bowel sounds, soft, nontender, nondistended  Musculoskeletal: BLE edema no clubbing or cyanosis to extremities  Psychiatric: Appropriate affect, cooperative  Neurologic: Oriented x 3, strength symmetric in all extremities, Cranial Nerves grossly intact to confrontation, speech clear  Skin: No rashes      Result Review:  I have personally reviewed the results from the time of this admission to 7/17/2025 23:38 EDT and agree with these findings:  [x]  Laboratory list / accordion  []  Microbiology  [x]  Radiology  []  EKG/Telemetry   []  Cardiology/Vascular   []  Pathology  []  Old records  []  Other:  Most notable findings include: /57. 72% on RA. Potassium 2.8. imaging notes anasarca    LAB RESULTS:      Lab 07/17/25 2016   WBC 9.97   HEMOGLOBIN 13.1   HEMATOCRIT 43.5   PLATELETS 232   NEUTROS ABS 6.53   IMMATURE GRANS (ABS) 0.03   LYMPHS ABS 2.39   MONOS ABS 0.64   EOS ABS 0.31   MCV 94.0         Lab 07/17/25 2016   SODIUM 140   POTASSIUM 2.8*   CHLORIDE 97*   CO2 35.3*   ANION GAP 7.7   BUN 6.2*   CREATININE 0.66   EGFR 91.0   GLUCOSE 147*   CALCIUM 8.6   MAGNESIUM 1.9   HEMOGLOBIN A1C 5.50         Lab 07/17/25 2016   TOTAL PROTEIN 5.9*   ALBUMIN 3.5    GLOBULIN 2.4   ALT (SGPT) 33   AST (SGOT) 40*   BILIRUBIN 0.2   ALK PHOS 96         Lab 07/17/25 2143 07/17/25 2016   PROBNP  --  2,586.0*   HSTROP T 26* 27*                 Brief Urine Lab Results  (Last result in the past 365 days)        Color   Clarity   Blood   Leuk Est   Nitrite   Protein   CREAT   Urine HCG        07/17/25 2220 Yellow   Clear   Trace   Small (1+)   Negative   Negative                 Microbiology Results (last 10 days)       Procedure Component Value - Date/Time    COVID PRE-OP / PRE-PROCEDURE SCREENING ORDER (NO ISOLATION) - Swab, Nasopharynx [[id number]]  (Normal) Collected: 07/17/25 2135    Lab Status: Final result Specimen: Swab from Nasopharynx Updated: 07/17/25 2212    Narrative:      The following orders were created for panel order COVID PRE-OP / PRE-PROCEDURE SCREENING ORDER (NO ISOLATION) - Swab, Nasopharynx.  Procedure                               Abnormality         Status                     ---------                               -----------         ------                     COVID-19 and FLU A/B PCR...[[id number]]  Normal              Final result                 Please view results for these tests on the individual orders.    COVID-19 and FLU A/B PCR, 1 HR TAT - Swab, Nasopharynx [[id number]]  (Normal) Collected: 07/17/25 2135    Lab Status: Final result Specimen: Swab from Nasopharynx Updated: 07/17/25 2212     COVID19 Not Detected     Influenza A PCR Not Detected     Influenza B PCR Not Detected            CT Abdomen Pelvis Without Contrast  Result Date: 7/17/2025  CT ABDOMEN PELVIS WO CONTRAST Date of Exam: 7/17/2025 8:53 PM EDT Indication: Acute abdominal pain. Comparison: None available Technique: Axial CT images were obtained of the abdomen and pelvis without the administration of contrast. Reconstructed coronal and sagittal images were also obtained. Automated exposure control and iterative construction methods were used. Findings: The heart size is normal. There is  no pericardial effusion. There is a trace right-sided pleural effusion. There is peripheral bronchiectasis and bronchial wall thickening within the left lower lobe and right middle lobe. The liver is normal in size. There is no focal liver lesion by noncontrast technique. The gallbladder is surgically absent. There is no intrahepatic or extrahepatic biliary ductal dilatation. The spleen is normal in size. The adrenal glands and pancreas appear within normal limits for noncontrast technique. The kidneys are symmetric in size. There is no hydronephrosis or urolithiasis. The urinary bladder is fluid-filled without wall thickening. The stomach and duodenum are normal in caliber and configuration. There are no abnormally dilated loops of small bowel to suggest small bowel obstruction. There is a small bowel containing left inguinal hernia. There is pancolonic diverticulosis without clear diverticulitis. There is diffuse edema and small volume ascites throughout the abdomen and pelvis. There is diffuse body wall edema and anasarca. The aorta is normal in caliber without evidence of aneurysm formation. There is aorto biiliac atherosclerotic calcification. There is degenerative disc disease at L4-L5 and L5-S1. There is inferior endplate compression deformity of the L2 vertebral body.     Impression: Impression: 1.Diffuse body wall edema and anasarca with small volume ascites and trace right-sided pleural effusion. 2.Small bowel containing left inguinal hernia without evidence of obstruction. 3.Pancolonic diverticulosis without diverticulitis. 4.Chronic appearing inferior endplate compression deformity of the L2 vertebral body. Electronically Signed: Juwan Marion MD  7/17/2025 9:05 PM EDT  Workstation ID: [id number]    XR Chest 1 View  Result Date: 7/17/2025  XR CHEST 1 VW Date of Exam: 7/17/2025 7:47 PM EDT Indication: Shortness of breath Comparison: 6/20/2023. Findings: The heart size is normal. There is diffuse  interstitial prominence which probably is due to pulmonary interstitial edema although this can be seen with an atypical infection. There is no pleural effusion or pneumothorax. There are chronic age-related changes involving the bony thorax and thoracic aorta.     Impression: Impression: Diffuse interstitial prominence which probably is due to pulmonary interstitial edema although this can be seen with an atypical infection. Electronically Signed: Joe Rice MD  7/17/2025 8:24 PM EDT  Workstation ID: [id number]      Results for orders placed during the hospital encounter of 06/19/23    Adult Transthoracic Echo Complete W/ Cont if Necessary Per Protocol 06/23/2023 10:36 AM    Interpretation Summary    Left ventricular systolic function is normal. Calculated left ventricular EF = 56.6% Left ventricular ejection fraction appears to be 56 - 60%.    Left ventricular diastolic function is consistent with (grade I) impaired relaxation.    The right ventricular cavity is moderately dilated.    The right atrial cavity is mildly  dilated.    Estimated right ventricular systolic pressure from tricuspid regurgitation is normal (<35 mmHg).      Assessment & Plan  Assessment & Plan       CHF exacerbation    Rectal bleeding    COPD (? Severity)    Pulmonary nodule (7mm LLL incidental)    Chronic respiratory failure with hypoxia    Supplemental oxygen dependent    Severe malnutrition      73 yo female here with heart failure    CHF Exacerbation  - hemodynamically stable, bu BP labile. Holding further diuresis for now  - imaging notes anasarca with pulmonary edema and small ascites  - on lasix for unknown reason, out for several weeks  - TTE 6/2023 with EF 56 to 60%; right ventricular systolic pressure from tricuspid regurgitation is normal (<35 mmHg); Left ventricular diastolic function is consistent with (grade I) impaired relaxation.   - trop 27, then 26. Continue to trend cardiac biomarkers  - no EKG changes  - administered 80  mg lasix in ED  - strict I&O  - daily weights  - check TSH, magnesium  - initiate heart failure pathway  - consult to cardiology  - repeat echocardiogram  - close monitor on telemetry  - am labs    Hypokalemia  - replace as needed per protocol  - no EKG changes  - check magnesium    Chronic Respiratory Failure  COPD  - Known Pulmonary Nodule; 7 x 6 x 5 mm diameter medial left lower lobe pulmonary nodule seen on CT.   - followed by pulmonology outpatient  - former smoker  - on 4L NC at baseline. Stable.  - continue Symbicort + Spiriva   - as needed pulmonary toilet    Medication Non Compliance  - ? Was on Eliquis or unknown reason, daughter suspects 2/2 prior DVT  - consult pharmacy    Malnutrition BMI 14  Failure to thrive  - consult to nutrition  - PT/OT      DVT prophylaxis:  heparin    CODE STATUS:  full code  Code Status (Patient has no pulse and is not breathing): CPR (Attempt to Resuscitate)  Medical Interventions (Patient has pulse or is breathing): Full Support  Level Of Support Discussed With: Patient      Expected Discharge  TBD      This note has been completed as part of a split-shared workflow.     Signature: Electronically signed by Tona Ordaz PA-C, 07/17/25, 11:38 PM EDT                Electronically signed by Odalys Su MD at 07/18/25 0602       Prior to Admission Medications       None          Current Facility-Administered Medications   Medication Dose Route Frequency Provider Last Rate Last Admin    acetaminophen (TYLENOL) tablet 650 mg  650 mg Oral Q4H PRN Tona Ordaz PA-C        Or    acetaminophen (TYLENOL) 160 MG/5ML oral solution 650 mg  650 mg Oral Q4H PRN Tona Ordaz PA-C        Or    acetaminophen (TYLENOL) suppository 650 mg  650 mg Rectal Q4H PRN Tona Ordaz PA-C        apixaban (ELIQUIS) tablet 5 mg  5 mg Oral Q12H Janna Rausch MD   5 mg at 07/22/25 0910    sennosides-docusate (PERICOLACE) 8.6-50 MG per tablet 2 tablet  2 tablet Oral BID Janna Rausch  MD   2 tablet at 07/21/25 2003    And    polyethylene glycol (MIRALAX) packet 17 g  17 g Oral Daily Janna Rausch MD   17 g at 07/19/25 1706    And    bisacodyl (DULCOLAX) EC tablet 5 mg  5 mg Oral Daily PRN Janna Rausch MD        And    bisacodyl (DULCOLAX) suppository 10 mg  10 mg Rectal Daily PRN Janna Rausch MD        budesonide-formoterol (SYMBICORT) 160-4.5 MCG/ACT inhaler 2 puff  2 puff Inhalation BID - RT Tona Ordaz PA-C   2 puff at 07/22/25 0917    cefTRIAXone (ROCEPHIN) 1,000 mg in sodium chloride 0.9 % 100 mL MBP  1,000 mg Intravenous Q24H Janna Rausch  mL/hr at 07/21/25 1321 1,000 mg at 07/21/25 1321    ipratropium-albuterol (DUO-NEB) nebulizer solution 3 mL  3 mL Nebulization 4x Daily - RT Pat Edwards MD   3 mL at 07/22/25 0917    ipratropium-albuterol (DUO-NEB) nebulizer solution 3 mL  3 mL Nebulization Q4H PRN Janna Rausch MD        lactobacillus acidophilus (RISAQUAD) capsule 1 capsule  1 capsule Oral Daily Janna Rausch MD   1 capsule at 07/22/25 0910    melatonin tablet 5 mg  5 mg Oral Nightly Tona Ordaz PA-C   5 mg at 07/21/25 2003    methylPREDNISolone sodium succinate (SOLU-Medrol) injection 40 mg  40 mg Intravenous Daily Janna Rausch MD   40 mg at 07/22/25 0910    nicotine (NICODERM CQ) 21 MG/24HR patch 1 patch  1 patch Transdermal Q24H Pat Edwards MD   1 patch at 07/22/25 0911    nitroglycerin (NITROSTAT) SL tablet 0.4 mg  0.4 mg Sublingual Q5 Min PRN Tona Ordaz PA-C        ondansetron (ZOFRAN) injection 4 mg  4 mg Intravenous Q6H PRN Tona Ordaz PA-C        Pharmacy Consult - MTM   Not Applicable Daily Castillo Farias, [facility]        Potassium Replacement - Follow Nurse / BPA Driven Protocol   Not Applicable PRN Bala Hernandez MD        sodium chloride 0.9 % flush 10 mL  10 mL Intravenous PRN Bala Hernandez MD        sodium chloride 0.9 % flush 10 mL  10 mL Intravenous PRN Bala Hernandez MD        sodium chloride 0.9 %  flush 10 mL  10 mL Intravenous Q12H Tona Ordaz PA-C   10 mL at 25 0911    sodium chloride 0.9 % flush 10 mL  10 mL Intravenous PRN Tona Ordaz PA-C        sodium chloride 0.9 % infusion 40 mL  40 mL Intravenous PRN Tona Ordaz PA-C        thiamine (VITAMIN B-1) tablet 100 mg  100 mg Oral Daily Janna Rausch MD   100 mg at 25 0910        Physician Progress Notes (last 72 hours)        Pat Edwards MD at 25 0713              [facility] Medicine Services  PROGRESS NOTE    Patient Name: Elisabeth Burger  : 1950  MRN: [id number]    Date of Admission: 2025  Primary Care Physician: Deonna Pagan PA    Subjective   Subjective     CC:  Lower extremity edema     HPI:  On baseline oxygen of 4 liters: daughters at bedside with all of their questions about stage of copd and medication reconciliation answered.    Objective   Objective     Vital Signs:   Temp:  [97.7 °F (36.5 °C)-98.8 °F (37.1 °C)] 97.7 °F (36.5 °C)  Heart Rate:  [] 90  Resp:  [18-36] 20  BP: ()/(55-74) 107/60  Flow (L/min) (Oxygen Therapy):  [4] 4     Physical Exam:  Constitutional: No acute distress, awake, alert frail, cachectic, chronically ill-appearing  HENT: NCAT, mucous membranes moist, edentulous  Respiratory: Diminished breath sounds globally.  Cardiovascular: RRR  Gastrointestinal: Positive bowel sounds, soft, nontender, nondistended  Musculoskeletal: No bilateral ankle edema  Psychiatric: Appropriate affect, cooperative  Neurologic: Alert, oriented, symmetric facies, moves all extremities, speech clear  Skin: No rashes    Results Reviewed:  LAB RESULTS:      Lab 25  0401 25  0340 253 25   WBC 13.58* 8.30  --  9.97   HEMOGLOBIN 12.5 12.5  --  13.1   HEMATOCRIT 42.0 41.3  --  43.5   PLATELETS 241 221  --  232   NEUTROS ABS 10.83* 5.46  --  6.53   IMMATURE GRANS (ABS) 0.05 0.02  --  0.03   LYMPHS ABS 1.72 1.61  --  2.39   MONOS  ABS 0.92* 0.70  --  0.64   EOS ABS 0.02 0.43*  --  0.31   MCV 97.0 94.5  --  94.0   HSTROP T  --   --  26* 27*         Lab 07/21/25  0913 07/20/25 2009 07/20/25  1116 07/20/25 0401 07/19/25 0841 07/18/25 2147 07/18/25 0340 07/17/25 2016   SODIUM 131*  --  134* 135* 136  --  144 140   POTASSIUM 4.4 5.6* 5.7* 6.1* 4.8   < > 3.1* 2.8*   CHLORIDE 94*  --  97* 97* 90*  --  96* 97*   CO2 28.0  --  29.8* 31.6* 35.8*  --  38.8* 35.3*   ANION GAP 9.0  --  7.2 6.4 10.2  --  9.2 7.7   BUN 10.7  --  11.0 10.7 8.6  --  5.8* 6.2*   CREATININE 0.59  --  0.57 0.56* 0.62  --  0.56* 0.66   EGFR 93.5  --  94.3 94.7 92.4  --  94.7 91.0   GLUCOSE 75  --  63* 94 118*  --  153* 147*   CALCIUM 9.1  --  9.9 8.6 8.7  --  8.0* 8.6   MAGNESIUM  --   --   --   --  2.3  --   --  1.9   PHOSPHORUS  --   --   --   --  2.0*  --   --   --    HEMOGLOBIN A1C  --   --   --   --   --   --   --  5.50   TSH  --   --   --   --   --   --  1.150  --     < > = values in this interval not displayed.         Lab 07/20/25 0401 07/19/25 0841 07/18/25 0340 07/17/25 2016   TOTAL PROTEIN 6.5 6.5 5.8* 5.9*   ALBUMIN 3.4* 3.5 3.2* 3.5   GLOBULIN 3.1 3.0 2.6 2.4   ALT (SGPT) 34* 24 26 33   AST (SGOT) 39* 45* 30 40*   BILIRUBIN 0.2 0.2 0.2 0.2   ALK PHOS 91 91 89 96         Lab 07/17/25  2143 07/17/25 2016   PROBNP  --  2,586.0*   HSTROP T 26* 27*                 Lab 07/19/25 2018   PH, ARTERIAL 7.389   PCO2, ARTERIAL 70.5*   PO2 ART 65.3*   FIO2 36   HCO3 ART 42.5*   BASE EXCESS ART 14.3*   CARBOXYHEMOGLOBIN 1.6     Brief Urine Lab Results  (Last result in the past 365 days)        Color   Clarity   Blood   Leuk Est   Nitrite   Protein   CREAT   Urine HCG        07/17/25 2220 Yellow   Clear   Trace   Small (1+)   Negative   Negative                   Microbiology Results Abnormal       None            SLP FEES - Fiberoptic Endo Eval Swallow  Result Date: 7/20/2025  This procedure was auto-finalized with no dictation required.    XR Chest 1 View  Result Date:  7/20/2025  XR CHEST 1 VW Date of Exam: 7/20/2025 11:33 AM EDT Indication: hypoxia Comparison: 7/19/2025 CT Findings: Unchanged cardiomediastinal silhouette. Persistent bibasilar airspace opacities. Emphysema. No new focal airspace consolidation. No pleural effusion or pneumothorax. No acute bone abnormality.     Impression: Impression: Emphysema with persistent bibasilar airspace opacities. No new focal consolidation. Electronically Signed: Karthkieyan Mckeon MD  7/20/2025 12:15 PM EDT  Workstation ID: [id number]      Results for orders placed during the hospital encounter of 07/17/25    Adult Transthoracic Echo Complete With Contrast if Necessary Per Protocol 07/18/2025 11:02 AM    Interpretation Summary    Left ventricular systolic function is normal. Calculated left ventricular EF = 65%    Left ventricular diastolic function was normal.    The right ventricular cavity is moderately dilated.    The right atrial cavity is mildly dilated.      Current medications:  Scheduled Meds:apixaban, 5 mg, Oral, Q12H  budesonide-formoterol, 2 puff, Inhalation, BID - RT  cefTRIAXone, 1,000 mg, Intravenous, Q24H  ipratropium-albuterol, 3 mL, Nebulization, 4x Daily - RT  lactobacillus acidophilus, 1 capsule, Oral, Daily  melatonin, 5 mg, Oral, Nightly  methylPREDNISolone sodium succinate, 40 mg, Intravenous, Daily  nicotine, 1 patch, Transdermal, Q24H  pharmacy consult - MTM, , Not Applicable, Daily  senna-docusate sodium, 2 tablet, Oral, BID   And  polyethylene glycol, 17 g, Oral, Daily  sodium chloride, 10 mL, Intravenous, Q12H  thiamine, 100 mg, Oral, Daily      Continuous Infusions:       PRN Meds:.  acetaminophen **OR** acetaminophen **OR** acetaminophen    senna-docusate sodium **AND** polyethylene glycol **AND** bisacodyl **AND** bisacodyl    ipratropium-albuterol    nitroglycerin    ondansetron    Potassium Replacement - Follow Nurse / BPA Driven Protocol    sodium chloride    [COMPLETED] Insert Peripheral IV **AND** sodium  chloride    sodium chloride    sodium chloride    Assessment & Plan   Assessment & Plan     Active Hospital Problems    Diagnosis  POA    **COPD with acute exacerbation [J44.1]  Yes    Rectal bleeding [K62.5]  Yes    Supplemental oxygen dependent [Z99.81]  Not Applicable    Chronic respiratory failure with hypoxia [J96.11]  Yes    Pulmonary nodule (7mm LLL incidental) [R91.1]  Yes    COPD (? Severity) [J44.9]  Yes    Severe malnutrition [E43]  Yes      Resolved Hospital Problems   No resolved problems to display.        Brief Hospital Course to date:  Elisabeth Burger is a 74 y.o. female with hx of COPD with chronic respiratory failure on 4L NC, known pulmonary nodule, former tobacco use disorder, HFpEF, who presented for evaluation of LE edema & shortness of breath. Patient had been out of medications for several weeks prior to presentation. CT abdomen/pelvis showed anasarca, left inguinal hernia, diverticulosis, chronic L2 compression fracture.    Acute copd exacerbation   -Continue scheduled duonebs  -Continue Solumedrol/ICS/Symbicort/Spiriva  - Maintain sats between 88-92%  - Continuous pulse ox  - CT chest from yesterday showed Bilateral lower lobe infectious/inflammatory process with endobronchial component, possibly related to aspiration/Advanced emphysematous changes.   - will obtain speech eval. mild, oral dysphagia, pharyngeal dysphagia : appreciate dietary recommendations.  - respiratory culture: unremarkable  - Continue IV rocephin  - aspiration precautions        Hyperkalemia, with early EKG changes  - resolved  - discontinue lokelma    Hx of HFpEF  - imaging w anasarca with pulmonary edema and small ascites  - TTE 6/2023 with EF 56 to 60%; right ventricular systolic pressure from tricuspid regurgitation is normal (<35 mmHg); Left ventricular diastolic function is consistent with (grade I) impaired relaxation.   - repeat echo 7/18: EF of 65% with normal diastolic function    Chronic Respiratory  Failure  Advanced COPD  - Known Pulmonary Nodule; 7 x 6 x 5 mm diameter medial left lower lobe pulmonary nodule seen on CT.   - on 4L NC at baseline.  - continue Symbicort + Spiriva    Rectal bleeding, resolved      Tobacco use disorder  - nicotine patch     Severe Malnutrition BMI 14  Failure to thrive  - likely related to advanced copd  - consulted nutrition  - PT/OT rec home         Expected Discharge Location and Transportation: home [facility] and  care   Expected Discharge   Expected discharge date/ time has not been documented.     VTE Prophylaxis:  Pharmacologic & mechanical VTE prophylaxis orders are present.         AM-PAC 6 Clicks Score (PT): 19 (25 1533)    CODE STATUS:   Code Status and Medical Interventions: CPR (Attempt to Resuscitate); Full Support   Ordered at: 25 5366     Code Status (Patient has no pulse and is not breathing):    CPR (Attempt to Resuscitate)     Medical Interventions (Patient has pulse or is breathing):    Full Support     Level Of Support Discussed With:    Patient       Pat Edwards MD  25       Electronically signed by Pat Edwards MD at 25 1655       Pat Edwards MD at 25 0812              [facility] Medicine Services  PROGRESS NOTE    Patient Name: Elisabeth Burger  : 1950  MRN: [id number]    Date of Admission: 2025  Primary Care Physician: Provider, No Known    Subjective   Subjective     CC:  Lower extremity edema     HPI:  Still dyspneic with increasing oxygen requirement, obtained speech consult due to concern for dysphagia    Objective   Objective     Vital Signs:   Temp:  [98 °F (36.7 °C)-99.2 °F (37.3 °C)] 98 °F (36.7 °C)  Heart Rate:  [] 96  Resp:  [17-40] 20  BP: ()/(52-70) 98/52  Flow (L/min) (Oxygen Therapy):  [4-5.5] 4     Physical Exam:  Constitutional: No acute distress, awake, alert frail, cachectic, chronically ill-appearing  HENT: NCAT, mucous membranes moist,  edentulous  Respiratory: Diminished breath sounds globally.  Cardiovascular: RRR  Gastrointestinal: Positive bowel sounds, soft, nontender, nondistended  Musculoskeletal: No bilateral ankle edema  Psychiatric: Appropriate affect, cooperative  Neurologic: Alert, oriented, symmetric facies, moves all extremities, speech clear  Skin: No rashes    Results Reviewed:  LAB RESULTS:      Lab 07/20/25 0401 07/18/25 0340 07/17/25 2143 07/17/25 2016   WBC 13.58* 8.30  --  9.97   HEMOGLOBIN 12.5 12.5  --  13.1   HEMATOCRIT 42.0 41.3  --  43.5   PLATELETS 241 221  --  232   NEUTROS ABS 10.83* 5.46  --  6.53   IMMATURE GRANS (ABS) 0.05 0.02  --  0.03   LYMPHS ABS 1.72 1.61  --  2.39   MONOS ABS 0.92* 0.70  --  0.64   EOS ABS 0.02 0.43*  --  0.31   MCV 97.0 94.5  --  94.0   HSTROP T  --   --  26* 27*         Lab 07/20/25 0401 07/19/25 0841 07/18/25 2147 07/18/25 0340 07/17/25 2016   SODIUM 135* 136  --  144 140   POTASSIUM 6.1* 4.8 4.2 3.1* 2.8*   CHLORIDE 97* 90*  --  96* 97*   CO2 31.6* 35.8*  --  38.8* 35.3*   ANION GAP 6.4 10.2  --  9.2 7.7   BUN 10.7 8.6  --  5.8* 6.2*   CREATININE 0.56* 0.62  --  0.56* 0.66   EGFR 94.7 92.4  --  94.7 91.0   GLUCOSE 94 118*  --  153* 147*   CALCIUM 8.6 8.7  --  8.0* 8.6   MAGNESIUM  --  2.3  --   --  1.9   PHOSPHORUS  --  2.0*  --   --   --    HEMOGLOBIN A1C  --   --   --   --  5.50   TSH  --   --   --  1.150  --          Lab 07/20/25 0401 07/19/25 0841 07/18/25 0340 07/17/25 2016   TOTAL PROTEIN 6.5 6.5 5.8* 5.9*   ALBUMIN 3.4* 3.5 3.2* 3.5   GLOBULIN 3.1 3.0 2.6 2.4   ALT (SGPT) 34* 24 26 33   AST (SGOT) 39* 45* 30 40*   BILIRUBIN 0.2 0.2 0.2 0.2   ALK PHOS 91 91 89 96         Lab 07/17/25  2143 07/17/25 2016   PROBNP  --  2,586.0*   HSTROP T 26* 27*                 Lab 07/19/25  2018   PH, ARTERIAL 7.389   PCO2, ARTERIAL 70.5*   PO2 ART 65.3*   FIO2 36   HCO3 ART 42.5*   BASE EXCESS ART 14.3*   CARBOXYHEMOGLOBIN 1.6     Brief Urine Lab Results  (Last result in the past 365  days)        Color   Clarity   Blood   Leuk Est   Nitrite   Protein   CREAT   Urine HCG        07/17/25 2220 Yellow   Clear   Trace   Small (1+)   Negative   Negative                   Microbiology Results Abnormal       None            CT Chest Without Contrast Diagnostic  Result Date: 7/19/2025  CT CHEST WO CONTRAST DIAGNOSTIC Date of Exam: 7/19/2025 4:01 PM EDT Indication: shortness of breath. Comparison: Chest CT 3/15/2024. Technique: Axial CT images were obtained of the chest without contrast administration.  Reconstructed coronal and sagittal images were also obtained. Automated exposure control and iterative construction methods were used. Findings: Bilateral lower lobe bronchial wall thickening with mucoid impaction and downstream nodular consolidations, compatible with an infectious/inflammatory process. Advanced emphysematous changes. No suspicious pulmonary nodule. Trace right pleural effusion. No pneumothorax. Upper limits of normal caliber atherosclerotic thoracic aorta, unchanged. Coronary artery calcifications. No pericardial effusion. No pathologically enlarged lymph nodes. No acute chest wall abnormality. Visualized portions of the upper abdomen demonstrate no acute findings. Multilevel thoracic spondylosis. No acute or suspicious osseous lesion.     Impression: Impression: Bilateral lower lobe infectious/inflammatory process with endobronchial component, possibly related to aspiration. Trace right pleural effusion. Advanced emphysematous changes. Electronically Signed: Ayo Catherine MD  7/19/2025 5:51 PM EDT  Workstation ID: [id number]      Results for orders placed during the hospital encounter of 07/17/25    Adult Transthoracic Echo Complete With Contrast if Necessary Per Protocol 07/18/2025 11:02 AM    Interpretation Summary    Left ventricular systolic function is normal. Calculated left ventricular EF = 65%    Left ventricular diastolic function was normal.    The right ventricular cavity is  moderately dilated.    The right atrial cavity is mildly dilated.      Current medications:  Scheduled Meds:apixaban, 5 mg, Oral, Q12H  budesonide-formoterol, 2 puff, Inhalation, BID - RT  cefTRIAXone, 1,000 mg, Intravenous, Q24H  ipratropium-albuterol, 3 mL, Nebulization, 4x Daily - RT  lactobacillus acidophilus, 1 capsule, Oral, Daily  melatonin, 5 mg, Oral, Nightly  methylPREDNISolone sodium succinate, 40 mg, Intravenous, Daily  nicotine, 1 patch, Transdermal, Q24H  pharmacy consult - MTM, , Not Applicable, Daily  senna-docusate sodium, 2 tablet, Oral, BID   And  polyethylene glycol, 17 g, Oral, Daily  sodium chloride, 10 mL, Intravenous, Q12H  sodium zirconium cyclosilicate, 10 g, Oral, TID  thiamine, 100 mg, Oral, Daily      Continuous Infusions:     PRN Meds:.  acetaminophen **OR** acetaminophen **OR** acetaminophen    senna-docusate sodium **AND** polyethylene glycol **AND** bisacodyl **AND** bisacodyl    ipratropium-albuterol    nitroglycerin    ondansetron    Potassium Replacement - Follow Nurse / BPA Driven Protocol    sodium chloride    [COMPLETED] Insert Peripheral IV **AND** sodium chloride    sodium chloride    sodium chloride    Assessment & Plan   Assessment & Plan     Active Hospital Problems    Diagnosis  POA    **COPD with acute exacerbation [J44.1]  Yes    Rectal bleeding [K62.5]  Yes    Supplemental oxygen dependent [Z99.81]  Not Applicable    Chronic respiratory failure with hypoxia [J96.11]  Yes    Pulmonary nodule (7mm LLL incidental) [R91.1]  Yes    COPD (? Severity) [J44.9]  Yes    Severe malnutrition [E43]  Yes      Resolved Hospital Problems   No resolved problems to display.        Brief Hospital Course to date:  Elisabeth Burger is a 74 y.o. female with hx of COPD with chronic respiratory failure on 4L NC, known pulmonary nodule, former tobacco use disorder, HFpEF, who presented for evaluation of LE edema & shortness of breath. Patient had been out of medications for several weeks prior  to presentation. CT abdomen/pelvis showed anasarca, left inguinal hernia, diverticulosis, chronic L2 compression fracture.    Acute copd exacerbation   -Continue scheduled duonebs  -Continue Solumedrol/ICS/Symbicort/Spiriva  - Maintain sats between 88-92%  - continuous pulse ox  - CT chest from yesterday showed Bilateral lower lobe infectious/inflammatory process with endobronchial component, possibly related to aspiration/Advanced emphysematous changes.   - will obtain speech eval. Continue IV rocephin  - aspiration precautions  - Npo, start maintenance fluids in the interim.      Hyperkalemia, with early EKG changes  - Obtained stat EKG: T waves appear peaked and tall notably leads V2-V4 and narrow based concerning for early changes Irregularly irregular rhythm, indistinct p waves  - IV ca gluconate to stabilize cardiac membrane  - insulin and glucose pushed  - Repeat Potassium levels  - Monitor on telemetry  - Begin Lokelma 10 mg TID    Hx of HFpEF  - imaging w anasarca with pulmonary edema and small ascites  - TTE 6/2023 with EF 56 to 60%; right ventricular systolic pressure from tricuspid regurgitation is normal (<35 mmHg); Left ventricular diastolic function is consistent with (grade I) impaired relaxation.   - repeat echo 7/18: EF of 65% with normal diastolic function    Chronic Respiratory Failure  Advanced COPD  - Known Pulmonary Nodule; 7 x 6 x 5 mm diameter medial left lower lobe pulmonary nodule seen on CT.   - on 4L NC at baseline.  - continue Symbicort + Spiriva    Rectal bleeding, resolved      Tobacco use disorder  - nicotine patch     Severe Malnutrition BMI 14  Failure to thrive  - likely related to advanced copd  - consulted nutrition  - PT/OT rec home         Expected Discharge Location and Transportation: home w  and 24/7 care   Expected Discharge   Expected discharge date/ time has not been documented.     VTE Prophylaxis:  Pharmacologic & mechanical VTE prophylaxis orders are present.          AM-PAC 6 Clicks Score (PT): 20 (07/20/25 0829)    CODE STATUS:   Code Status and Medical Interventions: CPR (Attempt to Resuscitate); Full Support   Ordered at: 07/17/25 9625     Code Status (Patient has no pulse and is not breathing):    CPR (Attempt to Resuscitate)     Medical Interventions (Patient has pulse or is breathing):    Full Support     Level Of Support Discussed With:    Patient       Pat Edwards MD  07/20/25       Electronically signed by Pat Edwards MD at 07/20/25 1354       Consult Notes (last 72 hours)  Notes from 07/19/25 1123 through 07/22/25 1123   No notes of this type exist for this encounter.

## 2025-07-22 NOTE — PROGRESS NOTES
Continued Stay Note  [facility]     Patient Name: Elisabeth Burger  MRN: [id number]  Today's Date: 7/22/2025    Admit Date: 7/17/2025    Plan: Home with [facility]   Discharge Plan       Row Name 07/22/25 1400       Plan    Plan Home with [facility]    Patient/Family in Agreement with Plan yes    Final Discharge Disposition Code 50 - home with hospice    Final Note Patient discharging home with [facility] via private car; POA has EMS DNR. Patient has portable oxygen [facility] and [facility] to deliver DME/switchout portable oxygen and concentrator. FLOR Saldivar knows to call [facility] when patient arrives home to schedule admission visit to start hospice services. Family has received [id number]. Please call [phone] if can be of further assistance.      Row Name 07/22/25 1331       Plan    Final Discharge Disposition Code 50 - home with hospice    Final Note Update to previous final note: Patient and family have met with hospice services and have decided to discharge home with hospice. CM updated [facility]. No further needs identified.                   Discharge Codes    No documentation.                 Expected Discharge Date and Time       Expected Discharge Date Expected Discharge Time    Jul 22, 2025               Jennifer Mckeon RN

## 2025-07-22 NOTE — CASE MANAGEMENT/SOCIAL WORK
Case Management Discharge Note      Final Note: CM spoke with patient and family at bedside. Plans to discharge today. CM spoke with Navin at [facility], updated oxygen requirements, requested portable O2, and rolling walker ordered. CM updated patient and family . CM delivered IMM, documented. Family at bedside will transport when patient is ready for discharge. Jennifer with [facility] aware of patients discharge, [facility] will contact patient directly. Patient and family deny any further discharge planning needs. No further needs identified.         Selected Continued Care - Admitted Since 7/17/2025       Destination    No services have been selected for the patient.                Durable Medical Equipment Coordination complete.      Service Provider Services Address Phone Fax Patient Preferred    [facility] [place] [facility], Oxygen Equipment and Accessories [address], [place] [id number] [phone] --              Dialysis/Infusion    No services have been selected for the patient.                Home Medical Care Coordination complete.      Service Provider Services Address Phone Fax Patient Preferred    [facility] [place] [facility] [address], [place] [id number] [phone] --              Therapy    No services have been selected for the patient.                Community Resources    No services have been selected for the patient.                Community & DME    No services have been selected for the patient.                    Transportation Services  Transportation: Private Transportation  Private: Car    Final Discharge Disposition Code: 06 - home with home health care

## 2025-07-22 NOTE — PAYOR COMM NOTE
"Ref# [id number]   Still pending  Clinical Update  Discharge Summary    HERBERTH Burrell, RN  Utilization Review  Phone [phone]  Fax [phone]    [address] [address]           Elisabeth Burger (74 y.o. Female)       Date of Birth   1950    Social Security Number       Address   [address] [place] [address] [place] [id number]    Home Phone   [phone]    MRN   [id number]       Anabaptist   None    Marital Status                               Admission Date   7/17/2025    Admission Type   Emergency    Admitting Provider   Pat Edwards MD    Attending Provider       Department, Room/Bed   [facility] 5G, S557/1       Discharge Date   7/22/2025    Discharge Disposition   Home or Self Care    Discharge Destination                                 Attending Provider: (none)   Allergies: No Known Allergies    Isolation: None   Infection: None   Code Status: CPR    Ht: 170.2 cm (67.01\")   Wt: 48.1 kg (106 lb 1.6 oz)    Admission Cmt: None   Principal Problem: COPD with acute exacerbation [J44.1]                   Active Insurance as of 7/17/2025       Primary Coverage       Payor Plan Insurance Group Employer/Plan Group    ANTHEM MEDICARE REPLACEMENT ANTHEM MEDICARE ADVANTAGE HMO [id number]       Payor Plan Address Payor Plan Phone Number Payor Plan Fax Number Effective Dates    [address] [phone]  1/1/2025 - None Entered    [facility] [id number]         Subscriber Name Subscriber Birth Date Member ID       ELISABETH BURGER FAY 1950 [id number]               Secondary Coverage       Payor Plan Insurance Group Employer/Plan Group    [place]        Payor Plan Address Payor Plan Phone Number Payor Plan Fax Number Effective Dates    [address] [phone]  6/19/2023 - None Entered    [facility] [id number]         Subscriber Name Subscriber Birth Date Member ID       ELISABETH BURGER FAY 1950 [id number]  "                    Emergency Contacts        (Rel.) Home Phone Work Phone Mobile Phone    CYNDI BURGER (Son) [phone] -- [phone]    EUNICE SY (Daughter) [phone] -- [phone]    (unknown),Leigh (niece) (Relative) [phone] -- --                 Physician Progress Notes (last 4 days)        Pat Edwards MD at 25 0713              [facility] Medicine Services  PROGRESS NOTE    Patient Name: Elisabeth Burger  : 1950  MRN: [id number]    Date of Admission: 2025  Primary Care Physician: Deonna Pagan PA    Subjective   Subjective     CC:  Lower extremity edema     HPI:  On baseline oxygen of 4 liters: daughters at bedside with all of their questions about stage of copd and medication reconciliation answered.    Objective   Objective     Vital Signs:   Temp:  [97.7 °F (36.5 °C)-98.8 °F (37.1 °C)] 97.7 °F (36.5 °C)  Heart Rate:  [] 90  Resp:  [18-36] 20  BP: ()/(55-74) 107/60  Flow (L/min) (Oxygen Therapy):  [4] 4     Physical Exam:  Constitutional: No acute distress, awake, alert frail, cachectic, chronically ill-appearing  HENT: NCAT, mucous membranes moist, edentulous  Respiratory: Diminished breath sounds globally.  Cardiovascular: RRR  Gastrointestinal: Positive bowel sounds, soft, nontender, nondistended  Musculoskeletal: No bilateral ankle edema  Psychiatric: Appropriate affect, cooperative  Neurologic: Alert, oriented, symmetric facies, moves all extremities, speech clear  Skin: No rashes    Results Reviewed:  LAB RESULTS:      Lab 25  0401 25  0340 25  2143 25   WBC 13.58* 8.30  --  9.97   HEMOGLOBIN 12.5 12.5  --  13.1   HEMATOCRIT 42.0 41.3  --  43.5   PLATELETS 241 221  --  232   NEUTROS ABS 10.83* 5.46  --  6.53   IMMATURE GRANS (ABS) 0.05 0.02  --  0.03   LYMPHS ABS 1.72 1.61  --  2.39   MONOS ABS 0.92* 0.70  --  0.64   EOS ABS 0.02 0.43*  --  0.31   MCV 97.0 94.5  --  94.0   HSTROP T   --   --  26* 27*         Lab 07/21/25  0913 07/20/25 2009 07/20/25  1116 07/20/25 0401 07/19/25 0841 07/18/25 2147 07/18/25 0340 07/17/25 2016   SODIUM 131*  --  134* 135* 136  --  144 140   POTASSIUM 4.4 5.6* 5.7* 6.1* 4.8   < > 3.1* 2.8*   CHLORIDE 94*  --  97* 97* 90*  --  96* 97*   CO2 28.0  --  29.8* 31.6* 35.8*  --  38.8* 35.3*   ANION GAP 9.0  --  7.2 6.4 10.2  --  9.2 7.7   BUN 10.7  --  11.0 10.7 8.6  --  5.8* 6.2*   CREATININE 0.59  --  0.57 0.56* 0.62  --  0.56* 0.66   EGFR 93.5  --  94.3 94.7 92.4  --  94.7 91.0   GLUCOSE 75  --  63* 94 118*  --  153* 147*   CALCIUM 9.1  --  9.9 8.6 8.7  --  8.0* 8.6   MAGNESIUM  --   --   --   --  2.3  --   --  1.9   PHOSPHORUS  --   --   --   --  2.0*  --   --   --    HEMOGLOBIN A1C  --   --   --   --   --   --   --  5.50   TSH  --   --   --   --   --   --  1.150  --     < > = values in this interval not displayed.         Lab 07/20/25 0401 07/19/25 0841 07/18/25 0340 07/17/25 2016   TOTAL PROTEIN 6.5 6.5 5.8* 5.9*   ALBUMIN 3.4* 3.5 3.2* 3.5   GLOBULIN 3.1 3.0 2.6 2.4   ALT (SGPT) 34* 24 26 33   AST (SGOT) 39* 45* 30 40*   BILIRUBIN 0.2 0.2 0.2 0.2   ALK PHOS 91 91 89 96         Lab 07/17/25 2143 07/17/25 2016   PROBNP  --  2,586.0*   HSTROP T 26* 27*                 Lab 07/19/25 2018   PH, ARTERIAL 7.389   PCO2, ARTERIAL 70.5*   PO2 ART 65.3*   FIO2 36   HCO3 ART 42.5*   BASE EXCESS ART 14.3*   CARBOXYHEMOGLOBIN 1.6     Brief Urine Lab Results  (Last result in the past 365 days)        Color   Clarity   Blood   Leuk Est   Nitrite   Protein   CREAT   Urine HCG        07/17/25 2220 Yellow   Clear   Trace   Small (1+)   Negative   Negative                   Microbiology Results Abnormal       None            SLP FEES - Fiberoptic Endo Eval Swallow  Result Date: 7/20/2025  This procedure was auto-finalized with no dictation required.    XR Chest 1 View  Result Date: 7/20/2025  XR CHEST 1 VW Date of Exam: 7/20/2025 11:33 AM EDT Indication: hypoxia  Comparison: 7/19/2025 CT Findings: Unchanged cardiomediastinal silhouette. Persistent bibasilar airspace opacities. Emphysema. No new focal airspace consolidation. No pleural effusion or pneumothorax. No acute bone abnormality.     Impression: Impression: Emphysema with persistent bibasilar airspace opacities. No new focal consolidation. Electronically Signed: Karthikeyan Mckeon MD  7/20/2025 12:15 PM EDT  Workstation ID: [id number]      Results for orders placed during the hospital encounter of 07/17/25    Adult Transthoracic Echo Complete With Contrast if Necessary Per Protocol 07/18/2025 11:02 AM    Interpretation Summary    Left ventricular systolic function is normal. Calculated left ventricular EF = 65%    Left ventricular diastolic function was normal.    The right ventricular cavity is moderately dilated.    The right atrial cavity is mildly dilated.      Current medications:  Scheduled Meds:apixaban, 5 mg, Oral, Q12H  budesonide-formoterol, 2 puff, Inhalation, BID - RT  cefTRIAXone, 1,000 mg, Intravenous, Q24H  ipratropium-albuterol, 3 mL, Nebulization, 4x Daily - RT  lactobacillus acidophilus, 1 capsule, Oral, Daily  melatonin, 5 mg, Oral, Nightly  methylPREDNISolone sodium succinate, 40 mg, Intravenous, Daily  nicotine, 1 patch, Transdermal, Q24H  pharmacy consult - MTM, , Not Applicable, Daily  senna-docusate sodium, 2 tablet, Oral, BID   And  polyethylene glycol, 17 g, Oral, Daily  sodium chloride, 10 mL, Intravenous, Q12H  thiamine, 100 mg, Oral, Daily      Continuous Infusions:       PRN Meds:.  acetaminophen **OR** acetaminophen **OR** acetaminophen    senna-docusate sodium **AND** polyethylene glycol **AND** bisacodyl **AND** bisacodyl    ipratropium-albuterol    nitroglycerin    ondansetron    Potassium Replacement - Follow Nurse / BPA Driven Protocol    sodium chloride    [COMPLETED] Insert Peripheral IV **AND** sodium chloride    sodium chloride    sodium chloride    Assessment & Plan   Assessment & Plan      Active Hospital Problems    Diagnosis  POA    **COPD with acute exacerbation [J44.1]  Yes    Rectal bleeding [K62.5]  Yes    Supplemental oxygen dependent [Z99.81]  Not Applicable    Chronic respiratory failure with hypoxia [J96.11]  Yes    Pulmonary nodule (7mm LLL incidental) [R91.1]  Yes    COPD (? Severity) [J44.9]  Yes    Severe malnutrition [E43]  Yes      Resolved Hospital Problems   No resolved problems to display.        Brief Hospital Course to date:  Elisabeth uBrger is a 74 y.o. female with hx of COPD with chronic respiratory failure on 4L NC, known pulmonary nodule, former tobacco use disorder, HFpEF, who presented for evaluation of LE edema & shortness of breath. Patient had been out of medications for several weeks prior to presentation. CT abdomen/pelvis showed anasarca, left inguinal hernia, diverticulosis, chronic L2 compression fracture.    Acute copd exacerbation   -Continue scheduled duonebs  -Continue Solumedrol/ICS/Symbicort/Spiriva  - Maintain sats between 88-92%  - Continuous pulse ox  - CT chest from yesterday showed Bilateral lower lobe infectious/inflammatory process with endobronchial component, possibly related to aspiration/Advanced emphysematous changes.   - will obtain speech eval. mild, oral dysphagia, pharyngeal dysphagia : appreciate dietary recommendations.  - respiratory culture: unremarkable  - Continue IV rocephin  - aspiration precautions        Hyperkalemia, with early EKG changes  - resolved  - discontinue lokelma    Hx of HFpEF  - imaging w anasarca with pulmonary edema and small ascites  - TTE 6/2023 with EF 56 to 60%; right ventricular systolic pressure from tricuspid regurgitation is normal (<35 mmHg); Left ventricular diastolic function is consistent with (grade I) impaired relaxation.   - repeat echo 7/18: EF of 65% with normal diastolic function    Chronic Respiratory Failure  Advanced COPD  - Known Pulmonary Nodule; 7 x 6 x 5 mm diameter medial left lower lobe  pulmonary nodule seen on CT.   - on 4L NC at baseline.  - continue Symbicort + Spiriva    Rectal bleeding, resolved      Tobacco use disorder  - nicotine patch     Severe Malnutrition BMI 14  Failure to thrive  - likely related to advanced copd  - consulted nutrition  - PT/OT rec home         Expected Discharge Location and Transportation: home w  and  care   Expected Discharge   Expected discharge date/ time has not been documented.     VTE Prophylaxis:  Pharmacologic & mechanical VTE prophylaxis orders are present.         AM-PAC 6 Clicks Score (PT): 19 (25 5373)    CODE STATUS:   Code Status and Medical Interventions: CPR (Attempt to Resuscitate); Full Support   Ordered at: 25 3374     Code Status (Patient has no pulse and is not breathing):    CPR (Attempt to Resuscitate)     Medical Interventions (Patient has pulse or is breathing):    Full Support     Level Of Support Discussed With:    Patient       Pat Edwards MD  25       Electronically signed by Pat Edwards MD at 25 7165       Pat Edwards MD at 25 0880              [facility] Medicine Services  PROGRESS NOTE    Patient Name: Elisabeth Burger  : 1950  MRN: [id number]    Date of Admission: 2025  Primary Care Physician: Provider, No Known    Subjective   Subjective     CC:  Lower extremity edema     HPI:  Still dyspneic with increasing oxygen requirement, obtained speech consult due to concern for dysphagia    Objective   Objective     Vital Signs:   Temp:  [98 °F (36.7 °C)-99.2 °F (37.3 °C)] 98 °F (36.7 °C)  Heart Rate:  [] 96  Resp:  [17-40] 20  BP: ()/(52-70) 98/52  Flow (L/min) (Oxygen Therapy):  [4-5.5] 4     Physical Exam:  Constitutional: No acute distress, awake, alert frail, cachectic, chronically ill-appearing  HENT: NCAT, mucous membranes moist, edentulous  Respiratory: Diminished breath sounds globally.  Cardiovascular: RRR  Gastrointestinal: Positive  bowel sounds, soft, nontender, nondistended  Musculoskeletal: No bilateral ankle edema  Psychiatric: Appropriate affect, cooperative  Neurologic: Alert, oriented, symmetric facies, moves all extremities, speech clear  Skin: No rashes    Results Reviewed:  LAB RESULTS:      Lab 07/20/25 0401 07/18/25 0340 07/17/25 2143 07/17/25 2016   WBC 13.58* 8.30  --  9.97   HEMOGLOBIN 12.5 12.5  --  13.1   HEMATOCRIT 42.0 41.3  --  43.5   PLATELETS 241 221  --  232   NEUTROS ABS 10.83* 5.46  --  6.53   IMMATURE GRANS (ABS) 0.05 0.02  --  0.03   LYMPHS ABS 1.72 1.61  --  2.39   MONOS ABS 0.92* 0.70  --  0.64   EOS ABS 0.02 0.43*  --  0.31   MCV 97.0 94.5  --  94.0   HSTROP T  --   --  26* 27*         Lab 07/20/25 0401 07/19/25 0841 07/18/25 2147 07/18/25 0340 07/17/25 2016   SODIUM 135* 136  --  144 140   POTASSIUM 6.1* 4.8 4.2 3.1* 2.8*   CHLORIDE 97* 90*  --  96* 97*   CO2 31.6* 35.8*  --  38.8* 35.3*   ANION GAP 6.4 10.2  --  9.2 7.7   BUN 10.7 8.6  --  5.8* 6.2*   CREATININE 0.56* 0.62  --  0.56* 0.66   EGFR 94.7 92.4  --  94.7 91.0   GLUCOSE 94 118*  --  153* 147*   CALCIUM 8.6 8.7  --  8.0* 8.6   MAGNESIUM  --  2.3  --   --  1.9   PHOSPHORUS  --  2.0*  --   --   --    HEMOGLOBIN A1C  --   --   --   --  5.50   TSH  --   --   --  1.150  --          Lab 07/20/25 0401 07/19/25 0841 07/18/25 0340 07/17/25 2016   TOTAL PROTEIN 6.5 6.5 5.8* 5.9*   ALBUMIN 3.4* 3.5 3.2* 3.5   GLOBULIN 3.1 3.0 2.6 2.4   ALT (SGPT) 34* 24 26 33   AST (SGOT) 39* 45* 30 40*   BILIRUBIN 0.2 0.2 0.2 0.2   ALK PHOS 91 91 89 96         Lab 07/17/25  2143 07/17/25 2016   PROBNP  --  2,586.0*   HSTROP T 26* 27*                 Lab 07/19/25 2018   PH, ARTERIAL 7.389   PCO2, ARTERIAL 70.5*   PO2 ART 65.3*   FIO2 36   HCO3 ART 42.5*   BASE EXCESS ART 14.3*   CARBOXYHEMOGLOBIN 1.6     Brief Urine Lab Results  (Last result in the past 365 days)        Color   Clarity   Blood   Leuk Est   Nitrite   Protein   CREAT   Urine HCG        07/17/25 2227  Yellow   Clear   Trace   Small (1+)   Negative   Negative                   Microbiology Results Abnormal       None            CT Chest Without Contrast Diagnostic  Result Date: 7/19/2025  CT CHEST WO CONTRAST DIAGNOSTIC Date of Exam: 7/19/2025 4:01 PM EDT Indication: shortness of breath. Comparison: Chest CT 3/15/2024. Technique: Axial CT images were obtained of the chest without contrast administration.  Reconstructed coronal and sagittal images were also obtained. Automated exposure control and iterative construction methods were used. Findings: Bilateral lower lobe bronchial wall thickening with mucoid impaction and downstream nodular consolidations, compatible with an infectious/inflammatory process. Advanced emphysematous changes. No suspicious pulmonary nodule. Trace right pleural effusion. No pneumothorax. Upper limits of normal caliber atherosclerotic thoracic aorta, unchanged. Coronary artery calcifications. No pericardial effusion. No pathologically enlarged lymph nodes. No acute chest wall abnormality. Visualized portions of the upper abdomen demonstrate no acute findings. Multilevel thoracic spondylosis. No acute or suspicious osseous lesion.     Impression: Impression: Bilateral lower lobe infectious/inflammatory process with endobronchial component, possibly related to aspiration. Trace right pleural effusion. Advanced emphysematous changes. Electronically Signed: Ayo Catherine MD  7/19/2025 5:51 PM EDT  Workstation ID: [id number]      Results for orders placed during the hospital encounter of 07/17/25    Adult Transthoracic Echo Complete With Contrast if Necessary Per Protocol 07/18/2025 11:02 AM    Interpretation Summary    Left ventricular systolic function is normal. Calculated left ventricular EF = 65%    Left ventricular diastolic function was normal.    The right ventricular cavity is moderately dilated.    The right atrial cavity is mildly dilated.      Current medications:  Scheduled  Meds:apixaban, 5 mg, Oral, Q12H  budesonide-formoterol, 2 puff, Inhalation, BID - RT  cefTRIAXone, 1,000 mg, Intravenous, Q24H  ipratropium-albuterol, 3 mL, Nebulization, 4x Daily - RT  lactobacillus acidophilus, 1 capsule, Oral, Daily  melatonin, 5 mg, Oral, Nightly  methylPREDNISolone sodium succinate, 40 mg, Intravenous, Daily  nicotine, 1 patch, Transdermal, Q24H  pharmacy consult - MTM, , Not Applicable, Daily  senna-docusate sodium, 2 tablet, Oral, BID   And  polyethylene glycol, 17 g, Oral, Daily  sodium chloride, 10 mL, Intravenous, Q12H  sodium zirconium cyclosilicate, 10 g, Oral, TID  thiamine, 100 mg, Oral, Daily      Continuous Infusions:     PRN Meds:.  acetaminophen **OR** acetaminophen **OR** acetaminophen    senna-docusate sodium **AND** polyethylene glycol **AND** bisacodyl **AND** bisacodyl    ipratropium-albuterol    nitroglycerin    ondansetron    Potassium Replacement - Follow Nurse / BPA Driven Protocol    sodium chloride    [COMPLETED] Insert Peripheral IV **AND** sodium chloride    sodium chloride    sodium chloride    Assessment & Plan   Assessment & Plan     Active Hospital Problems    Diagnosis  POA    **COPD with acute exacerbation [J44.1]  Yes    Rectal bleeding [K62.5]  Yes    Supplemental oxygen dependent [Z99.81]  Not Applicable    Chronic respiratory failure with hypoxia [J96.11]  Yes    Pulmonary nodule (7mm LLL incidental) [R91.1]  Yes    COPD (? Severity) [J44.9]  Yes    Severe malnutrition [E43]  Yes      Resolved Hospital Problems   No resolved problems to display.        Brief Hospital Course to date:  Elisabeth Burger is a 74 y.o. female with hx of COPD with chronic respiratory failure on 4L NC, known pulmonary nodule, former tobacco use disorder, HFpEF, who presented for evaluation of LE edema & shortness of breath. Patient had been out of medications for several weeks prior to presentation. CT abdomen/pelvis showed anasarca, left inguinal hernia, diverticulosis, chronic L2  compression fracture.    Acute copd exacerbation   -Continue scheduled duonebs  -Continue Solumedrol/ICS/Symbicort/Spiriva  - Maintain sats between 88-92%  - continuous pulse ox  - CT chest from yesterday showed Bilateral lower lobe infectious/inflammatory process with endobronchial component, possibly related to aspiration/Advanced emphysematous changes.   - will obtain speech eval. Continue IV rocephin  - aspiration precautions  - Npo, start maintenance fluids in the interim.      Hyperkalemia, with early EKG changes  - Obtained stat EKG: T waves appear peaked and tall notably leads V2-V4 and narrow based concerning for early changes Irregularly irregular rhythm, indistinct p waves  - IV ca gluconate to stabilize cardiac membrane  - insulin and glucose pushed  - Repeat Potassium levels  - Monitor on telemetry  - Begin Lokelma 10 mg TID    Hx of HFpEF  - imaging w anasarca with pulmonary edema and small ascites  - TTE 6/2023 with EF 56 to 60%; right ventricular systolic pressure from tricuspid regurgitation is normal (<35 mmHg); Left ventricular diastolic function is consistent with (grade I) impaired relaxation.   - repeat echo 7/18: EF of 65% with normal diastolic function    Chronic Respiratory Failure  Advanced COPD  - Known Pulmonary Nodule; 7 x 6 x 5 mm diameter medial left lower lobe pulmonary nodule seen on CT.   - on 4L NC at baseline.  - continue Symbicort + Spiriva    Rectal bleeding, resolved      Tobacco use disorder  - nicotine patch     Severe Malnutrition BMI 14  Failure to thrive  - likely related to advanced copd  - consulted nutrition  - PT/OT rec home         Expected Discharge Location and Transportation: home w  and 24/7 care   Expected Discharge   Expected discharge date/ time has not been documented.     VTE Prophylaxis:  Pharmacologic & mechanical VTE prophylaxis orders are present.         AM-PAC 6 Clicks Score (PT): 20 (07/20/25 8625)    CODE STATUS:   Code Status and Medical  Interventions: CPR (Attempt to Resuscitate); Full Support   Ordered at: 25 2247     Code Status (Patient has no pulse and is not breathing):    CPR (Attempt to Resuscitate)     Medical Interventions (Patient has pulse or is breathing):    Full Support     Level Of Support Discussed With:    Patient       Pat Edwards MD  25       Electronically signed by Pat Edwards MD at 25 1354       Janna Rausch MD at 25 0858              [facility] Medicine Services  PROGRESS NOTE    Patient Name: Elisabeth Burger  : 1950  MRN: [id number]    Date of Admission: 2025  Primary Care Physician: Provider, No Known    Subjective   Subjective     CC:  Lower extremity edema     HPI:  Had 2 BM yesterday  Net negative 2.8L, but had 5 unmeasured voids  On 6L  Has frequent coughing, increased sputum production  No chest pain.   Shortness of breath improving after breathing treatment.  Her family reports severe tachypnea earlier this morning that improved with breathing treatment.  Patient's daughter and niece bedside and patient okay w them being updated    Objective   Objective     Vital Signs:   Temp:  [97.4 °F (36.3 °C)-99.7 °F (37.6 °C)] 97.4 °F (36.3 °C)  Heart Rate:  [65-87] 87  Resp:  [16-20] 20  BP: (105-115)/(52-63) 105/52  Flow (L/min) (Oxygen Therapy):  [5-6] 5     Physical Exam:  Constitutional: No acute distress, awake, alert frail, cachectic, chronically ill-appearing  HENT: NCAT, mucous membranes moist, edentulous  Respiratory: Diminished throughout with scattered expiratory wheezing, prolonged expiratory phase, respiratory effort normal   Cardiovascular: RRR  Gastrointestinal: Positive bowel sounds, soft, nontender, nondistended  Musculoskeletal: No bilateral ankle edema  Psychiatric: Appropriate affect, cooperative  Neurologic: Alert, oriented, symmetric facies, moves all extremities, speech clear  Skin: No rashes    Results Reviewed:  LAB RESULTS:      Lab  07/18/25 0340 07/17/25 2143 07/17/25 2016   WBC 8.30  --  9.97   HEMOGLOBIN 12.5  --  13.1   HEMATOCRIT 41.3  --  43.5   PLATELETS 221  --  232   NEUTROS ABS 5.46  --  6.53   IMMATURE GRANS (ABS) 0.02  --  0.03   LYMPHS ABS 1.61  --  2.39   MONOS ABS 0.70  --  0.64   EOS ABS 0.43*  --  0.31   MCV 94.5  --  94.0   HSTROP T  --  26* 27*         Lab 07/19/25  0841 07/18/25 2147 07/18/25 0340 07/17/25 2016   SODIUM 136  --  144 140   POTASSIUM 4.8 4.2 3.1* 2.8*   CHLORIDE 90*  --  96* 97*   CO2 35.8*  --  38.8* 35.3*   ANION GAP 10.2  --  9.2 7.7   BUN 8.6  --  5.8* 6.2*   CREATININE 0.62  --  0.56* 0.66   EGFR 92.4  --  94.7 91.0   GLUCOSE 118*  --  153* 147*   CALCIUM 8.7  --  8.0* 8.6   MAGNESIUM 2.3  --   --  1.9   PHOSPHORUS 2.0*  --   --   --    HEMOGLOBIN A1C  --   --   --  5.50   TSH  --   --  1.150  --          Lab 07/19/25  0841 07/18/25 0340 07/17/25 2016   TOTAL PROTEIN 6.5 5.8* 5.9*   ALBUMIN 3.5 3.2* 3.5   GLOBULIN 3.0 2.6 2.4   ALT (SGPT) 24 26 33   AST (SGOT) 45* 30 40*   BILIRUBIN 0.2 0.2 0.2   ALK PHOS 91 89 96         Lab 07/17/25 2143 07/17/25 2016   PROBNP  --  2,586.0*   HSTROP T 26* 27*                 Brief Urine Lab Results  (Last result in the past 365 days)        Color   Clarity   Blood   Leuk Est   Nitrite   Protein   CREAT   Urine HCG        07/17/25 2220 Yellow   Clear   Trace   Small (1+)   Negative   Negative                   Microbiology Results Abnormal       None            CT Abdomen Pelvis Without Contrast  Result Date: 7/17/2025  CT ABDOMEN PELVIS WO CONTRAST Date of Exam: 7/17/2025 8:53 PM EDT Indication: Acute abdominal pain. Comparison: None available Technique: Axial CT images were obtained of the abdomen and pelvis without the administration of contrast. Reconstructed coronal and sagittal images were also obtained. Automated exposure control and iterative construction methods were used. Findings: The heart size is normal. There is no pericardial effusion. There is a  trace right-sided pleural effusion. There is peripheral bronchiectasis and bronchial wall thickening within the left lower lobe and right middle lobe. The liver is normal in size. There is no focal liver lesion by noncontrast technique. The gallbladder is surgically absent. There is no intrahepatic or extrahepatic biliary ductal dilatation. The spleen is normal in size. The adrenal glands and pancreas appear within normal limits for noncontrast technique. The kidneys are symmetric in size. There is no hydronephrosis or urolithiasis. The urinary bladder is fluid-filled without wall thickening. The stomach and duodenum are normal in caliber and configuration. There are no abnormally dilated loops of small bowel to suggest small bowel obstruction. There is a small bowel containing left inguinal hernia. There is pancolonic diverticulosis without clear diverticulitis. There is diffuse edema and small volume ascites throughout the abdomen and pelvis. There is diffuse body wall edema and anasarca. The aorta is normal in caliber without evidence of aneurysm formation. There is aorto biiliac atherosclerotic calcification. There is degenerative disc disease at L4-L5 and L5-S1. There is inferior endplate compression deformity of the L2 vertebral body.     Impression: Impression: 1.Diffuse body wall edema and anasarca with small volume ascites and trace right-sided pleural effusion. 2.Small bowel containing left inguinal hernia without evidence of obstruction. 3.Pancolonic diverticulosis without diverticulitis. 4.Chronic appearing inferior endplate compression deformity of the L2 vertebral body. Electronically Signed: Juwan Marion MD  7/17/2025 9:05 PM EDT  Workstation ID: [id number]    XR Chest 1 View  Result Date: 7/17/2025  XR CHEST 1 VW Date of Exam: 7/17/2025 7:47 PM EDT Indication: Shortness of breath Comparison: 6/20/2023. Findings: The heart size is normal. There is diffuse interstitial prominence which probably is  due to pulmonary interstitial edema although this can be seen with an atypical infection. There is no pleural effusion or pneumothorax. There are chronic age-related changes involving the bony thorax and thoracic aorta.     Impression: Impression: Diffuse interstitial prominence which probably is due to pulmonary interstitial edema although this can be seen with an atypical infection. Electronically Signed: Joe Rice MD  7/17/2025 8:24 PM EDT  Workstation ID: [id number]      Results for orders placed during the hospital encounter of 07/17/25    Adult Transthoracic Echo Complete With Contrast if Necessary Per Protocol 07/18/2025 11:02 AM    Interpretation Summary    Left ventricular systolic function is normal. Calculated left ventricular EF = 65%    Left ventricular diastolic function was normal.    The right ventricular cavity is moderately dilated.    The right atrial cavity is mildly dilated.      Current medications:  Scheduled Meds:apixaban, 5 mg, Oral, Q12H  azithromycin, 500 mg, Oral, Q24H  budesonide-formoterol, 2 puff, Inhalation, BID - RT  cefTRIAXone, 1,000 mg, Intravenous, Q24H  ipratropium-albuterol, 3 mL, Nebulization, 4x Daily - RT  lactobacillus acidophilus, 1 capsule, Oral, Daily  magnesium oxide, 400 mg, Oral, BID  melatonin, 5 mg, Oral, Nightly  methylPREDNISolone sodium succinate, 40 mg, Intravenous, Daily  nicotine, 1 patch, Transdermal, Q24H  pharmacy consult - MTM, , Not Applicable, Daily  sodium chloride, 10 mL, Intravenous, Q12H  thiamine, 100 mg, Oral, Daily      Continuous Infusions:   PRN Meds:.  acetaminophen **OR** acetaminophen **OR** acetaminophen    senna-docusate sodium **AND** polyethylene glycol **AND** bisacodyl **AND** bisacodyl    ipratropium-albuterol    nitroglycerin    ondansetron    Potassium Replacement - Follow Nurse / BPA Driven Protocol    sodium chloride    [COMPLETED] Insert Peripheral IV **AND** sodium chloride    sodium chloride    sodium chloride    Assessment &  Plan   Assessment & Plan     Active Hospital Problems    Diagnosis  POA    **COPD with acute exacerbation [J44.1]  Yes    Rectal bleeding [K62.5]  Yes    Supplemental oxygen dependent [Z99.81]  Not Applicable    Chronic respiratory failure with hypoxia [J96.11]  Yes    Pulmonary nodule (7mm LLL incidental) [R91.1]  Yes    COPD (? Severity) [J44.9]  Yes    Severe malnutrition [E43]  Yes      Resolved Hospital Problems   No resolved problems to display.        Brief Hospital Course to date:  Elisabeth Burger is a 74 y.o. female with hx of COPD with chronic respiratory failure on 4L NC, known pulmonary nodule, former tobacco use disorder, HFpEF, who presented for evaluation of LE edema & shortness of breath. Patient had been out of medications for several weeks prior to presentation. CT abdomen/pelvis showed anasarca, left inguinal hernia, diverticulosis, chronic L2 compression fracture.    This patient's problems and plans were partially entered by my partner and updated as appropriate by me 07/19/25.    COPD exacerbation  Chronic respiratory failure requiring 4L at all times  Acute on chronic hypoxia in setting of above  Known pulmonary nodule, 7 x 6 x 5 mm diameter medial left lower lobe   -Continue scheduled duonebs; added on PRN duonebs  -Continue Symbicort  -Added on solumedrol  -Finish Azith; added on Ceftriaxone  -Sputum culture pending  -Changed goal sats to 88-92% given COPD  -Airway clearance    Hx of HFpEF  - imaging w anasarca with pulmonary edema and small ascites  - TTE 6/2023 with EF 56 to 60%; right ventricular systolic pressure from tricuspid regurgitation is normal (<35 mmHg); Left ventricular diastolic function is consistent with (grade I) impaired relaxation.   - daily weights  - repeat echo: EF of 65% with normal diastolic function  - stop diuretics today    Rectal bleeding, resolved  -Occurred prior to admission, has not recurred since admission    Hypokalemia  -replace per protocol    Tobacco  use disorder  - nicotine patch     Severe Malnutrition BMI 14  Failure to thrive  - likely related to advanced copd; has chronic illness with severe muscle wasting and subcutaneous fat loss  - consulted nutrition  - PT/OT rec home     On eliquis outpatient; resumed     Family updated bedside today      Expected Discharge Location and Transportation: home w  and 24/7 care   Expected Discharge   Expected discharge date/ time has not been documented.     VTE Prophylaxis:  Pharmacologic & mechanical VTE prophylaxis orders are present.         AM-PAC 6 Clicks Score (PT): 20 (07/19/25 1200)    CODE STATUS:   Code Status and Medical Interventions: CPR (Attempt to Resuscitate); Full Support   Ordered at: 07/17/25 4178     Code Status (Patient has no pulse and is not breathing):    CPR (Attempt to Resuscitate)     Medical Interventions (Patient has pulse or is breathing):    Full Support     Level Of Support Discussed With:    Patient       Janna Rausch MD  07/19/25       Electronically signed by Janna Rausch MD at 07/19/25 1455          Consult Notes (last 4 days)        Jarrod Flor MD at 07/18/25 1814          Cardiology Consult / H&P  Date: 07/17/2025     Reason for Consultation: Evaluate for possible congestive heart failure    Chief complaint transient Asman unresponsiveness and blood per rectum.    History of present illness: This is a 74-year-old female who was brought in by her family from [place] directly to the emergency department.  She lives with her daughter who takes care of her on a daily basis.  The patient suffers from end-stage COPD on home O2.  She has 100-pack-year history of smoking.  On day of admission while sitting on the couch she had become transiently unresponsive for several minutes and then came back to herself.  In addition the daughter had noted blood per rectum earlier.  The patient did not complain of chest pain chest discomfort palpitations or worsening of her baseline shortness of  breath.  Reportedly on admission exam revealed rectal prolapse with no overt bleeding.  No further neurologic events were noted.  She has no orthopnea or PND.  EKG on admission revealed normal sinus rhythm with pulmonary disease pattern but no acute ST-T wave abnormalities.  proBNP was elevated compared to her baseline was over 2500.  Chest x-ray declined at this close normal-sized heart and possible pulmonary venous congestion but chronic interstitial changes could not be excluded.  I had reviewed both today's chest x-rays as well as chest x-rays from the last several years and noted minimal change.  In addition she had a CT scan of her chest last year attesting to severe COPD.  2D echogram was performed and read by [facility].  I reviewed the echo findings online.  This shows normal normal LV systolic function normal LV diastolic function and normal left atrial pressures which basically exclude diastolic heart failure.  Labs were reviewed as well.  Minimally elevated and flat curve troponin is noted.    Family history-negative for premature coronary artery disease       Smoking history:  current every day smoker       Review of systems   Review of Systems   Constitutional:  Positive for fatigue.   HENT: Negative.     Eyes: Negative.    Respiratory:  Positive for shortness of breath.    Gastrointestinal:  Negative for abdominal distention and abdominal pain.   Endocrine: Negative for cold intolerance and heat intolerance.   Skin: Negative.    Neurological:  Positive for weakness.   Psychiatric/Behavioral:  Negative for agitation, behavioral problems and confusion.    All other systems reviewed and are negative.       Past Medical History  Past Medical History:   Diagnosis Date    COPD (chronic obstructive pulmonary disease)    , History reviewed. No pertinent surgical history.,   Family History   Problem Relation Age of Onset    Stroke Mother     Stroke Father    , and Allergies:  Patient has no known  "allergies.  Advanced dementia.    Physical Exam  Vitals and nursing note reviewed. Exam conducted with a chaperone present.   Constitutional:       Comments: cachectic   HENT:      Mouth/Throat:      Mouth: Mucous membranes are moist.      Pharynx: Oropharynx is clear.   Neck:      Vascular: No carotid bruit.   Cardiovascular:      Rate and Rhythm: Normal rate and regular rhythm.      Pulses: Normal pulses.      Heart sounds: Normal heart sounds. No murmur heard.     No friction rub. No gallop.      Comments: No JVD  Pulmonary:      Effort: Pulmonary effort is normal.      Breath sounds: Normal breath sounds.      Comments: Markedly decreased breath sounds bilaterally with rhonchi.  Skin:     General: Skin is warm and dry.      Capillary Refill: Capillary refill takes more than 3 seconds.   Neurological:      General: No focal deficit present.      Mental Status: She is alert.   Psychiatric:         Mood and Affect: Mood normal.          Labs:  Results from last 7 days   Lab Units 07/17/25 2143 07/17/25 2016   PROBNP pg/mL  --  2,586.0*   HSTROP T ng/L 26* 27*       Sodium Sodium   Date Value Ref Range Status   07/18/2025 144 136 - 145 mmol/L Final   07/17/2025 140 136 - 145 mmol/L Final      Potassium Potassium   Date Value Ref Range Status   07/18/2025 3.1 (L) 3.5 - 5.2 mmol/L Final   07/17/2025 2.8 (L) 3.5 - 5.2 mmol/L Final     Comment:     Specimen hemolyzed.  Result may be falsely elevated.      Chloride Chloride   Date Value Ref Range Status   07/18/2025 96 (L) 98 - 107 mmol/L Final   07/17/2025 97 (L) 98 - 107 mmol/L Final      Bicarbonate No results found for: \"PLASMABICARB\"   BUN BUN   Date Value Ref Range Status   07/18/2025 5.8 (L) 8.0 - 23.0 mg/dL Final   07/17/2025 6.2 (L) 8.0 - 23.0 mg/dL Final      Creatinine Creatinine   Date Value Ref Range Status   07/18/2025 0.56 (L) 0.57 - 1.00 mg/dL Final   07/17/2025 0.66 0.57 - 1.00 mg/dL Final      Calcium Calcium   Date Value Ref Range Status   07/18/2025 " "8.0 (L) 8.6 - 10.5 mg/dL Final   07/17/2025 8.6 8.6 - 10.5 mg/dL Final      Glucose      No components found for: \"GLUCOSE.*\"   eGFR           Estimated Glomerular Filtration Rate: 94.7 mL/min/1.73m2 (A) (by CKD-EPI based on SCr of 0.56 mg/dL (L)).   Urinalysis       Brief Urine Lab Results  (Last result in the past 365 days)        Color   Clarity   Blood   Leuk Est   Nitrite   Protein   CREAT   Urine HCG        07/17/25 2220 Yellow   Clear   Trace   Small (1+)   Negative   Negative                      Assessment & Plan     1-Non cardiogenic pulmonary venous congestion-mild and stable.  - No evidence for cardiac decompensation with essentially normal LV systolic and diastolic function ruling out heart failure.    2-End-stage O2 dependent COPD  -Per primary service.    Transient neurologic deficit which could have been related to poor oxygenation    Rectal polyp prolapse SP rectal bleeding.  Stable.    Per primary service    Current smoker with 100-pack-year history of smoking  -Smoking cessation protocol.  I will sign off call if needed.      Thank you.      Jarrod Flor MD  07/18/25  18:14 EDT            Electronically signed by Jarrod Flor MD at 07/18/25 1830       Discharge Summary    No notes of this type exist for this encounter.       Discharge Order (From admission, onward)       Start     Ordered    07/22/25 1018  Discharge patient  Once        Expected Discharge Date: 07/22/25   Expected Discharge Time: Afternoon   Discharge Disposition: Home or Self Care   Physician of Record for Attribution - Please select from Treatment Team: CHELSIE BILLINGS [[id number]]   Review needed by CMO to determine Physician of Record: No      Question Answer Comment   Physician of Record for Attribution - Please select from Treatment Team CHELSIE BILLINGS    Review needed by CMO to determine Physician of Record No        07/22/25 1029                  "

## 2025-07-22 NOTE — CASE MANAGEMENT/SOCIAL WORK
Case Management Discharge Note      Final Note: Update to previous final note: Patient and family have met with hospice services and have decided to discharge home with hospice. CM updated [facility]. No further needs identified.         Selected Continued Care - Admitted Since 7/17/2025       Destination    No services have been selected for the patient.                Durable Medical Equipment Coordination complete.      Service Provider Services Address Phone Fax Patient Preferred    [facility] Access[facility] [address], [facility] [address] [phone] --              Dialysis/Infusion    No services have been selected for the patient.                Home Medical Care    No services have been selected for the patient.                Therapy    No services have been selected for the patient.                Community Resources    No services have been selected for the patient.                Community & DME    No services have been selected for the patient.                    Transportation Services  Transportation: Private Transportation  Private: Car    Final Discharge Disposition Code: 50 - home with hospice

## 2025-07-22 NOTE — DISCHARGE SUMMARY
"    [facility] Medicine Services  DISCHARGE SUMMARY    Patient Name: Elisabeth Burger  : 1950  MRN: [id number]    Date of Admission: 2025  7:56 PM  Date of Discharge:  2025  Primary Care Physician: Deonna Pagan PA    Consults       Date and Time Order Name Status Description    2025 11:12 AM Inpatient Palliative Care MD Consult      2025 12:28 AM Inpatient Cardiology Consult Completed           Hospital Course     Active Hospital Problems    Diagnosis  POA    **COPD with acute exacerbation [J44.1]  Yes    Rectal bleeding [K62.5]  Yes    Supplemental oxygen dependent [Z99.81]  Not Applicable    Chronic respiratory failure with hypoxia [J96.11]  Yes    Pulmonary nodule (7mm LLL incidental) [R91.1]  Yes    COPD (? Severity) [J44.9]  Yes    Severe malnutrition [E43]  Yes      Resolved Hospital Problems   No resolved problems to display.      Hospital Course:  Elisabeth Burger is a 74 y.o. female with PMH significant for chronic respiratory failure secondary to COPD (baseline 4L NC), tobacco abuse, known LLL pulmonary nodule and malnutrition. She resides at home with her daughter. Niece had highly involved in patient's care until a recent family conflict. Overall, patient and daughter are poor historians. Ms. Burger was brought to [facility] ED on 2025 for evaluation of progressive dyspnea and lower extremity swelling. Daughter reported that patient had been out of meds for \"a few weeks\" and also noted rectal bleeding with bowel movements.  She was admitted to the hospital medicine service.  Cardiology evaluated    Acute exacerbation of COPD  Chronic respiratory failure with hypoxia / hypercapnia, baseline 4L NC  Ongoing tobacco abuse  - O2 sat 72% on arrival - required up to 6L NC. Unclear why not placed on BiPAP  - ABG on arrival with pH 7.389, pO2 65.3, pCO2 70  - CT chest showed bilateral lobe infectious vs inflammatory process with " endobronchial component possibly related to aspiration vs advanced emphysematous changes. Has known L pulmonary nodule 7x6x5 mm  - s/p IV Solumedrol - DC on PO Prednisone taper  - Sent new Rx for Trelegy inhaler - last filled Rx 11/24 - per daughter, is difficult for patient to use so was only using gray/red inhaler (which I presume was Albuterol). Counseled on importance of daily Trelegy use and that patient may need assistance  - Transition IV Ceftriaxone to oral Ceftin to complete total 7 days.  Add oral doxycycline for total 5 days  - SLP followed.  Mild oropharyngeal dysphagia.  Recommended soft solids, chopped meat and thin liquids  - Sent Rx for nicotine patch however family not optimistic she will stop smoking   - Palliative/hospice care followed.  Family ultimately opted for home hospice services at discharge    Hyperkalemia, with early EKG changes  - s/p Lokelma with resolution. No culprit meds.      Anasarca / pulmonary edema   - CT AP and CT chest with anasarca, small ascites and pulmonary edema  - ECHO with EF 65%, normal diastolic dysfunction   - Cardiology evaluated. Did not feel consistent with diastolic CHF. Felt more likely non-cardiogenic pulmonary venous congestion   - s/p IV diuresis  - May be related to malnutrition / hypoalbuminemia     Severe Malnutrition   Failure to thrive  - Likely related to advanced COPD and poor oral intake  - RD followed    Day of Discharge     HPI:   In bed. Breathing improved. Wants to go home. Daughter at bedside poor historian   Patient needs new PCP    Review of Systems  Gen- No fevers, chills  CV- No chest pain, palpitations  Resp- No cough, dyspnea  GI- No N/V/D, abd pain    Vital Signs:   Temp:  [97.6 °F (36.4 °C)-98.4 °F (36.9 °C)] 97.6 °F (36.4 °C)  Heart Rate:  [] 89  Resp:  [18-24] 18  BP: ()/(50-71) 105/65  Flow (L/min) (Oxygen Therapy):  [4] 4    Physical Exam:  Constitutional: No acute distress, awake, alert and conversant. Cachectic and  chronically ill appearing   HENT: NCAT, mucous membranes moist  Respiratory: Scattered expiratory wheezes, normal respiratory effort on 4L NC  Cardiovascular: RRR  Gastrointestinal: Positive bowel sounds, soft, nontender, nondistended  Musculoskeletal: No bilateral ankle edema  Psychiatric: Appropriate affect, cooperative with exam  Neurologic: Oriented x 3, moves all extremities spontaneously without focal deficits, speech clear    Pertinent  and/or Most Recent Results     LAB RESULTS:      Lab 07/20/25  0401 07/18/25  0340 07/17/25 2016   WBC 13.58* 8.30 9.97   HEMOGLOBIN 12.5 12.5 13.1   HEMATOCRIT 42.0 41.3 43.5   PLATELETS 241 221 232   NEUTROS ABS 10.83* 5.46 6.53   IMMATURE GRANS (ABS) 0.05 0.02 0.03   LYMPHS ABS 1.72 1.61 2.39   MONOS ABS 0.92* 0.70 0.64   EOS ABS 0.02 0.43* 0.31   MCV 97.0 94.5 94.0         Lab 07/22/25  0923 07/21/25  0913 07/20/25 2009 07/20/25  1116 07/20/25  0401 07/19/25  0841 07/18/25  2147 07/18/25 0340 07/17/25 2016   SODIUM  --  131*  --  134* 135* 136  --  144 140   POTASSIUM 4.6 4.4 5.6* 5.7* 6.1* 4.8   < > 3.1* 2.8*   CHLORIDE  --  94*  --  97* 97* 90*  --  96* 97*   CO2  --  28.0  --  29.8* 31.6* 35.8*  --  38.8* 35.3*   ANION GAP  --  9.0  --  7.2 6.4 10.2  --  9.2 7.7   BUN  --  10.7  --  11.0 10.7 8.6  --  5.8* 6.2*   CREATININE  --  0.59  --  0.57 0.56* 0.62  --  0.56* 0.66   EGFR  --  93.5  --  94.3 94.7 92.4  --  94.7 91.0   GLUCOSE  --  75  --  63* 94 118*  --  153* 147*   CALCIUM  --  9.1  --  9.9 8.6 8.7  --  8.0* 8.6   MAGNESIUM  --   --   --   --   --  2.3  --   --  1.9   PHOSPHORUS  --   --   --   --   --  2.0*  --   --   --    HEMOGLOBIN A1C  --   --   --   --   --   --   --   --  5.50   TSH  --   --   --   --   --   --   --  1.150  --     < > = values in this interval not displayed.         Lab 07/20/25  0401 07/19/25  0841 07/18/25  0340 07/17/25 2016   TOTAL PROTEIN 6.5 6.5 5.8* 5.9*   ALBUMIN 3.4* 3.5 3.2* 3.5   GLOBULIN 3.1 3.0 2.6 2.4   ALT (SGPT) 34* 24  26 33   AST (SGOT) 39* 45* 30 40*   BILIRUBIN 0.2 0.2 0.2 0.2   ALK PHOS 91 91 89 96         Lab 07/17/25  2143 07/17/25 2016   PROBNP  --  2,586.0*   HSTROP T 26* 27*         Lab 07/19/25 2018   PH, ARTERIAL 7.389   PCO2, ARTERIAL 70.5*   PO2 ART 65.3*   FIO2 36   HCO3 ART 42.5*   BASE EXCESS ART 14.3*   CARBOXYHEMOGLOBIN 1.6     Brief Urine Lab Results  (Last result in the past 365 days)        Color   Clarity   Blood   Leuk Est   Nitrite   Protein   CREAT   Urine HCG        07/17/25 2220 Yellow   Clear   Trace   Small (1+)   Negative   Negative                 Microbiology Results (last 10 days)       Procedure Component Value - Date/Time    Respiratory Culture - Sputum, Cough [[id number]] Collected: 07/19/25 1359    Lab Status: Final result Specimen: Sputum from Cough Updated: 07/21/25 1038     Respiratory Culture Scant growth (1+) Normal respiratory monique. No S. aureus or Pseudomonas aeruginosa detected. Final report.     Gram Stain Moderate (3+) WBCs seen      Few (2+) Epithelial cells seen      Moderate (3+) Mixed bacterial morphotypes seen on Gram Stain    Respiratory Panel PCR w/COVID-19(SARS-CoV-2) AUGUSTIN/RADHA/BEBE/PAD/COR/CAMERON In-House, NP Swab in UTM/VTM, 2 HR TAT - Swab, Nasopharynx [[id number]]  (Normal) Collected: 07/18/25 1452    Lab Status: Final result Specimen: Swab from Nasopharynx Updated: 07/18/25 1555     ADENOVIRUS, PCR Not Detected     Coronavirus 229E Not Detected     Coronavirus HKU1 Not Detected     Coronavirus NL63 Not Detected     Coronavirus OC43 Not Detected     COVID19 Not Detected     Human Metapneumovirus Not Detected     Human Rhinovirus/Enterovirus Not Detected     Influenza A PCR Not Detected     Influenza B PCR Not Detected     Parainfluenza Virus 1 Not Detected     Parainfluenza Virus 2 Not Detected     Parainfluenza Virus 3 Not Detected     Parainfluenza Virus 4 Not Detected     RSV, PCR Not Detected     Bordetella pertussis pcr Not Detected     Bordetella parapertussis PCR Not  Detected     Chlamydophila pneumoniae PCR Not Detected     Mycoplasma pneumo by PCR Not Detected    Narrative:      In the setting of a positive respiratory panel with a viral infection PLUS a negative procalcitonin without other underlying concern for bacterial infection, consider observing off antibiotics or discontinuation of antibiotics and continue supportive care. If the respiratory panel is positive for atypical bacterial infection (Bordetella pertussis, Chlamydophila pneumoniae, or Mycoplasma pneumoniae), consider antibiotic de-escalation to target atypical bacterial infection.    COVID PRE-OP / PRE-PROCEDURE SCREENING ORDER (NO ISOLATION) - Swab, Nasopharynx [[id number]]  (Normal) Collected: 07/17/25 2135    Lab Status: Final result Specimen: Swab from Nasopharynx Updated: 07/17/25 2212    Narrative:      The following orders were created for panel order COVID PRE-OP / PRE-PROCEDURE SCREENING ORDER (NO ISOLATION) - Swab, Nasopharynx.  Procedure                               Abnormality         Status                     ---------                               -----------         ------                     COVID-19 and FLU A/B PCR...[[id number]]  Normal              Final result                 Please view results for these tests on the individual orders.    COVID-19 and FLU A/B PCR, 1 HR TAT - Swab, Nasopharynx [[id number]]  (Normal) Collected: 07/17/25 2135    Lab Status: Final result Specimen: Swab from Nasopharynx Updated: 07/17/25 2212     COVID19 Not Detected     Influenza A PCR Not Detected     Influenza B PCR Not Detected          SLP FEES - Fiberoptic Endo Eval Swallow  Result Date: 7/20/2025  This procedure was auto-finalized with no dictation required.    XR Chest 1 View  Result Date: 7/20/2025  XR CHEST 1 VW Date of Exam: 7/20/2025 11:33 AM EDT Indication: hypoxia Comparison: 7/19/2025 CT Findings: Unchanged cardiomediastinal silhouette. Persistent bibasilar airspace opacities. Emphysema. No new  focal airspace consolidation. No pleural effusion or pneumothorax. No acute bone abnormality.     Impression: Emphysema with persistent bibasilar airspace opacities. No new focal consolidation. Electronically Signed: Karthikeyan Mckeon MD  7/20/2025 12:15 PM EDT  Workstation ID: [id number]    CT Chest Without Contrast Diagnostic  Result Date: 7/19/2025  CT CHEST WO CONTRAST DIAGNOSTIC Date of Exam: 7/19/2025 4:01 PM EDT Indication: shortness of breath. Comparison: Chest CT 3/15/2024. Technique: Axial CT images were obtained of the chest without contrast administration.  Reconstructed coronal and sagittal images were also obtained. Automated exposure control and iterative construction methods were used. Findings: Bilateral lower lobe bronchial wall thickening with mucoid impaction and downstream nodular consolidations, compatible with an infectious/inflammatory process. Advanced emphysematous changes. No suspicious pulmonary nodule. Trace right pleural effusion. No pneumothorax. Upper limits of normal caliber atherosclerotic thoracic aorta, unchanged. Coronary artery calcifications. No pericardial effusion. No pathologically enlarged lymph nodes. No acute chest wall abnormality. Visualized portions of the upper abdomen demonstrate no acute findings. Multilevel thoracic spondylosis. No acute or suspicious osseous lesion.     Impression: Bilateral lower lobe infectious/inflammatory process with endobronchial component, possibly related to aspiration. Trace right pleural effusion. Advanced emphysematous changes. Electronically Signed: Ayo Catherine MD  7/19/2025 5:51 PM EDT  Workstation ID: [id number]    CT Abdomen Pelvis Without Contrast  Result Date: 7/17/2025  CT ABDOMEN PELVIS WO CONTRAST Date of Exam: 7/17/2025 8:53 PM EDT Indication: Acute abdominal pain. Comparison: None available Technique: Axial CT images were obtained of the abdomen and pelvis without the administration of contrast. Reconstructed coronal and sagittal  images were also obtained. Automated exposure control and iterative construction methods were used. Findings: The heart size is normal. There is no pericardial effusion. There is a trace right-sided pleural effusion. There is peripheral bronchiectasis and bronchial wall thickening within the left lower lobe and right middle lobe. The liver is normal in size. There is no focal liver lesion by noncontrast technique. The gallbladder is surgically absent. There is no intrahepatic or extrahepatic biliary ductal dilatation. The spleen is normal in size. The adrenal glands and pancreas appear within normal limits for noncontrast technique. The kidneys are symmetric in size. There is no hydronephrosis or urolithiasis. The urinary bladder is fluid-filled without wall thickening. The stomach and duodenum are normal in caliber and configuration. There are no abnormally dilated loops of small bowel to suggest small bowel obstruction. There is a small bowel containing left inguinal hernia. There is pancolonic diverticulosis without clear diverticulitis. There is diffuse edema and small volume ascites throughout the abdomen and pelvis. There is diffuse body wall edema and anasarca. The aorta is normal in caliber without evidence of aneurysm formation. There is aorto biiliac atherosclerotic calcification. There is degenerative disc disease at L4-L5 and L5-S1. There is inferior endplate compression deformity of the L2 vertebral body.     Impression: 1.Diffuse body wall edema and anasarca with small volume ascites and trace right-sided pleural effusion. 2.Small bowel containing left inguinal hernia without evidence of obstruction. 3.Pancolonic diverticulosis without diverticulitis. 4.Chronic appearing inferior endplate compression deformity of the L2 vertebral body. Electronically Signed: Juwan Marion MD  7/17/2025 9:05 PM EDT  Workstation ID: [id number]    XR Chest 1 View  Result Date: 7/17/2025  XR CHEST 1 VW Date of Exam:  7/17/2025 7:47 PM EDT Indication: Shortness of breath Comparison: 6/20/2023. Findings: The heart size is normal. There is diffuse interstitial prominence which probably is due to pulmonary interstitial edema although this can be seen with an atypical infection. There is no pleural effusion or pneumothorax. There are chronic age-related changes involving the bony thorax and thoracic aorta.     Impression: Diffuse interstitial prominence which probably is due to pulmonary interstitial edema although this can be seen with an atypical infection. Electronically Signed: Joe Rice MD  7/17/2025 8:24 PM EDT  Workstation ID: [id number]    Results for orders placed during the hospital encounter of 07/17/25    Adult Transthoracic Echo Complete With Contrast if Necessary Per Protocol 07/18/2025 11:02 AM    Interpretation Summary    Left ventricular systolic function is normal. Calculated left ventricular EF = 65%    Left ventricular diastolic function was normal.    The right ventricular cavity is moderately dilated.    The right atrial cavity is mildly dilated.    Discharge Details        Discharge Medications        New Medications        Instructions Start Date   cefuroxime 500 MG tablet  Commonly known as: CEFTIN   500 mg, Oral, 2 Times Daily      doxycycline 100 MG capsule  Commonly known as: VIBRAMYCIN   100 mg, Oral, 2 Times Daily      Eliquis 5 MG tablet tablet  Generic drug: apixaban   5 mg, Oral, Every 12 Hours Scheduled      ipratropium-albuterol 0.5-2.5 mg/3 ml nebulizer  Commonly known as: DUO-NEB   3 mL, Nebulization, Every 4 Hours PRN      melatonin 5 MG tablet tablet   5 mg, Oral, Nightly PRN      nicotine 21 MG/24HR patch  Commonly known as: NICODERM CQ   1 patch, Transdermal, Every 24 Hours Scheduled   Start Date: July 23, 2025     polyethylene glycol 17 GM/SCOOP powder  Commonly known as: MIRALAX   Mix and drink 17 g by mouth Daily.      predniSONE 10 MG tablet  Commonly known as: DELTASONE   Take 4 tablets  by mouth Daily for 3 days, THEN 3 tablets Daily for 3 days, THEN 2 tablets Daily for 3 days, THEN 1 tablet Daily for 3 days.   Start Date: July 22, 2025     sennosides-docusate 8.6-50 MG per tablet  Commonly known as: PERICOLACE   2 tablets, Oral, Daily, May take an additional 2 tablets daily as needed for constipation.      thiamine 100 MG tablet  Commonly known as: VITAMIN B1   100 mg, Oral, Daily   Start Date: July 23, 2025     Trelegy Ellipta 100-62.5-25 MCG/ACT inhaler  Generic drug: Fluticasone-Umeclidin-Vilant   1 puff, Inhalation, Daily - RT             No Known Allergies    Discharge Disposition:Home or Self Care    Diet:  Diet Instructions       Diet: Regular/House Diet, Cardiac Diets, High Protein Diet; Healthy Heart (2-3 Na+); Soft to Chew (NDD 3); Chopped Meat; Thin (IDDSI 0)      Discharge Diet:  Regular/House Diet  Cardiac Diets  High Protein Diet       Cardiac Diet: Healthy Heart (2-3 Na+)    Texture: Soft to Chew (NDD 3)    Soft to Chew: Chopped Meat    Fluid Consistency: Thin (IDDSI 0)           Activity:  Activity Instructions       Activity as Tolerated            CODE STATUS:    Code Status and Medical Interventions: CPR (Attempt to Resuscitate); Full Support   Ordered at: 07/17/25 5846     Code Status (Patient has no pulse and is not breathing):    CPR (Attempt to Resuscitate)     Medical Interventions (Patient has pulse or is breathing):    Full Support     Level Of Support Discussed With:    Patient     No future appointments.    Additional Instructions for the Follow-ups that You Need to Schedule       Ambulatory Referral to Home Health   As directed      Face to Face Visit Date: 7/21/2025   Follow-up provider for Plan of Care?: I treated the patient in an acute care facility and will not continue treatment after discharge.   Follow-up provider: MELANI DUGGAN [[id number]]   Reason/Clinical Findings: status post hospital stay   Describe mobility limitations that make leaving home difficult:  impaired physical mobility, balance, gait and endurance   Nursing/Therapeutic Services Requested: Skilled Nursing Physical Therapy Occupational Therapy   Skilled nursing orders: COPD management O2 instruction   PT orders: Strengthening Home safety assessment   Occupational orders: Strengthening Home safety assessment   Frequency: 1 Week 1              Monica Bobby PA-C  07/22/25    Time Spent on Discharge:  I spent 40 minutes on this discharge activity which included: face-to-face encounter with the patient, reviewing the data in the system, coordination of the care with the nursing staff as well as consultants, documentation, and entering orders.

## 2025-07-23 NOTE — PAYOR COMM NOTE
"Ref# [id number]   Still Pending  Discharge Summary    HERBERTH Burrell, RN  Utilization Review  Phone [phone]  Fax [phone]    [place] [id number]           Elisabeth Burger (74 y.o. Female)       Date of Birth   1950    Social Security Number       Address   [address] [place] [id number]    Home Phone   [phone]    MRN   [id number]       [place]   None    Marital Status                               Admission Date   7/17/2025    Admission Type   Emergency    Admitting Provider   Pat Edwards MD    Attending Provider       Department, Room/Bed   [facility] 5G, S557/1       Discharge Date   7/22/2025    Discharge Disposition   Home or Self Care    Discharge Destination                                 Attending Provider: (none)   Allergies: No Known Allergies    Isolation: None   Infection: None   Code Status: Prior    Ht: 170.2 cm (67.01\")   Wt: 48.1 kg (106 lb 1.6 oz)    Admission Cmt: None   Principal Problem: COPD with acute exacerbation [J44.1]                   Active Insurance as of 7/17/2025       Primary Coverage       Payor Plan Insurance Group Employer/Plan Group    ANTHEM MEDICARE REPLACEMENT ANTHEM MEDICARE ADVANTAGE HMO [id number]       Payor Plan Address Payor Plan Phone Number Payor Plan Fax Number Effective Dates    [address] [phone]  1/1/2025 - None Entered    [facility] [id number]         Subscriber Name Subscriber Birth Date Member ID       ELISABETH BURGER FAY 1950 [id number]               Secondary Coverage       Payor Plan Insurance Group Employer/Plan Group    [place]        Payor Plan Address Payor Plan Phone Number Payor Plan Fax Number Effective Dates    [address] [phone]  6/19/2023 - None Entered    [facility] [id number]         Subscriber Name Subscriber Birth Date Member ID       ELISABETH BURGER FAY 1950 [id number]               "       Emergency Contacts        (Rel.) Home Phone Work Phone Mobile Phone    CYNDI BURGER (Son) [phone] -- [phone]    EUNICE SY (Daughter) [phone] -- [phone]    (unknown),Leigh (niece) (Relative) [phone] -- --                 Discharge Summary        Monica Bobby PA-C at 25 1030       Attestation signed by Pat Edwards MD at 25 0701      I have reviewed this documentation and agree.  Electronically signed by Pat Edwards MD, 25, 7:01 AM EDT.                         [facility]  DISCHARGE SUMMARY    Patient Name: Elisabeth Burger  : 1950  MRN: [id number]    Date of Admission: 2025  7:56 PM  Date of Discharge:  2025  Primary Care Physician: Deonna Pagan PA    Consults       Date and Time Order Name Status Description    2025 11:12 AM Inpatient Palliative Care MD Consult      2025 12:28 AM Inpatient Cardiology Consult Completed           Hospital Course     Active Hospital Problems    Diagnosis  POA    **COPD with acute exacerbation [J44.1]  Yes    Rectal bleeding [K62.5]  Yes    Supplemental oxygen dependent [Z99.81]  Not Applicable    Chronic respiratory failure with hypoxia [J96.11]  Yes    Pulmonary nodule (7mm LLL incidental) [R91.1]  Yes    COPD (? Severity) [J44.9]  Yes    Severe malnutrition [E43]  Yes      Resolved Hospital Problems   No resolved problems to display.      Hospital Course:  Elisabeth Burger is a 74 y.o. female with PMH significant for chronic respiratory failure secondary to COPD (baseline 4L NC), tobacco abuse, known LLL pulmonary nodule and malnutrition. She resides at home with her daughter. Niece had highly involved in patient's care until a recent family conflict. Overall, patient and daughter are poor historians. Ms. Burger was brought to [facility] ED on 2025 for evaluation of progressive dyspnea and lower extremity  "swelling. Daughter reported that patient had been out of meds for \"a few weeks\" and also noted rectal bleeding with bowel movements.  She was admitted to the hospital medicine service.  Cardiology evaluated    Acute exacerbation of COPD  Chronic respiratory failure with hypoxia / hypercapnia, baseline 4L NC  Ongoing tobacco abuse  - O2 sat 72% on arrival - required up to 6L NC. Unclear why not placed on BiPAP  - ABG on arrival with pH 7.389, pO2 65.3, pCO2 70  - CT chest showed bilateral lobe infectious vs inflammatory process with endobronchial component possibly related to aspiration vs advanced emphysematous changes. Has known L pulmonary nodule 7x6x5 mm  - s/p IV Solumedrol - DC on PO Prednisone taper  - Sent new Rx for Trelegy inhaler - last filled Rx 11/24 - per daughter, is difficult for patient to use so was only using gray/red inhaler (which I presume was Albuterol). Counseled on importance of daily Trelegy use and that patient may need assistance  - Transition IV Ceftriaxone to oral Ceftin to complete total 7 days.  Add oral doxycycline for total 5 days  - SLP followed.  Mild oropharyngeal dysphagia.  Recommended soft solids, chopped meat and thin liquids  - Sent Rx for nicotine patch however family not optimistic she will stop smoking   - Palliative/hospice care followed.  Family ultimately opted for home hospice services at discharge    Hyperkalemia, with early EKG changes  - s/p Lokelma with resolution. No culprit meds.      Anasarca / pulmonary edema   - CT AP and CT chest with anasarca, small ascites and pulmonary edema  - ECHO with EF 65%, normal diastolic dysfunction   - Cardiology evaluated. Did not feel consistent with diastolic CHF. Felt more likely non-cardiogenic pulmonary venous congestion   - s/p IV diuresis  - May be related to malnutrition / hypoalbuminemia     Severe Malnutrition   Failure to thrive  - Likely related to advanced COPD and poor oral intake  - RD followed    Day of Discharge "     HPI:   In bed. Breathing improved. Wants to go home. Daughter at bedside poor historian   Patient needs new PCP    Review of Systems  Gen- No fevers, chills  CV- No chest pain, palpitations  Resp- No cough, dyspnea  GI- No N/V/D, abd pain    Vital Signs:   Temp:  [97.6 °F (36.4 °C)-98.4 °F (36.9 °C)] 97.6 °F (36.4 °C)  Heart Rate:  [] 89  Resp:  [18-24] 18  BP: ()/(50-71) 105/65  Flow (L/min) (Oxygen Therapy):  [4] 4    Physical Exam:  Constitutional: No acute distress, awake, alert and conversant. Cachectic and chronically ill appearing   HENT: NCAT, mucous membranes moist  Respiratory: Scattered expiratory wheezes, normal respiratory effort on 4L NC  Cardiovascular: RRR  Gastrointestinal: Positive bowel sounds, soft, nontender, nondistended  Musculoskeletal: No bilateral ankle edema  Psychiatric: Appropriate affect, cooperative with exam  Neurologic: Oriented x 3, moves all extremities spontaneously without focal deficits, speech clear    Pertinent  and/or Most Recent Results     LAB RESULTS:      Lab 07/20/25  0401 07/18/25  0340 07/17/25 2016   WBC 13.58* 8.30 9.97   HEMOGLOBIN 12.5 12.5 13.1   HEMATOCRIT 42.0 41.3 43.5   PLATELETS 241 221 232   NEUTROS ABS 10.83* 5.46 6.53   IMMATURE GRANS (ABS) 0.05 0.02 0.03   LYMPHS ABS 1.72 1.61 2.39   MONOS ABS 0.92* 0.70 0.64   EOS ABS 0.02 0.43* 0.31   MCV 97.0 94.5 94.0         Lab 07/22/25  0923 07/21/25  0913 07/20/25 2009 07/20/25  1116 07/20/25  0401 07/19/25  0841 07/18/25  2147 07/18/25 0340 07/17/25 2016   SODIUM  --  131*  --  134* 135* 136  --  144 140   POTASSIUM 4.6 4.4 5.6* 5.7* 6.1* 4.8   < > 3.1* 2.8*   CHLORIDE  --  94*  --  97* 97* 90*  --  96* 97*   CO2  --  28.0  --  29.8* 31.6* 35.8*  --  38.8* 35.3*   ANION GAP  --  9.0  --  7.2 6.4 10.2  --  9.2 7.7   BUN  --  10.7  --  11.0 10.7 8.6  --  5.8* 6.2*   CREATININE  --  0.59  --  0.57 0.56* 0.62  --  0.56* 0.66   EGFR  --  93.5  --  94.3 94.7 92.4  --  94.7 91.0   GLUCOSE  --  75  --   63* 94 118*  --  153* 147*   CALCIUM  --  9.1  --  9.9 8.6 8.7  --  8.0* 8.6   MAGNESIUM  --   --   --   --   --  2.3  --   --  1.9   PHOSPHORUS  --   --   --   --   --  2.0*  --   --   --    HEMOGLOBIN A1C  --   --   --   --   --   --   --   --  5.50   TSH  --   --   --   --   --   --   --  1.150  --     < > = values in this interval not displayed.         Lab 07/20/25  0401 07/19/25  0841 07/18/25  0340 07/17/25 2016   TOTAL PROTEIN 6.5 6.5 5.8* 5.9*   ALBUMIN 3.4* 3.5 3.2* 3.5   GLOBULIN 3.1 3.0 2.6 2.4   ALT (SGPT) 34* 24 26 33   AST (SGOT) 39* 45* 30 40*   BILIRUBIN 0.2 0.2 0.2 0.2   ALK PHOS 91 91 89 96         Lab 07/17/25  2143 07/17/25  2016   PROBNP  --  2,586.0*   HSTROP T 26* 27*         Lab 07/19/25  2018   PH, ARTERIAL 7.389   PCO2, ARTERIAL 70.5*   PO2 ART 65.3*   FIO2 36   HCO3 ART 42.5*   BASE EXCESS ART 14.3*   CARBOXYHEMOGLOBIN 1.6     Brief Urine Lab Results  (Last result in the past 365 days)        Color   Clarity   Blood   Leuk Est   Nitrite   Protein   CREAT   Urine HCG        07/17/25 2220 Yellow   Clear   Trace   Small (1+)   Negative   Negative                 Microbiology Results (last 10 days)       Procedure Component Value - Date/Time    Respiratory Culture - Sputum, Cough [[id number]] Collected: 07/19/25 1359    Lab Status: Final result Specimen: Sputum from Cough Updated: 07/21/25 1038     Respiratory Culture Scant growth (1+) Normal respiratory monique. No S. aureus or Pseudomonas aeruginosa detected. Final report.     Gram Stain Moderate (3+) WBCs seen      Few (2+) Epithelial cells seen      Moderate (3+) Mixed bacterial morphotypes seen on Gram Stain    Respiratory Panel PCR w/COVID-19(SARS-CoV-2) AUGUSTIN/RADHA/BEBE/PAD/COR/CAMERON In-House, NP Swab in [facility]/VTM, 2 HR TAT - Swab, Nasopharynx [[id number]]  (Normal) Collected: 07/18/25 1452    Lab Status: Final result Specimen: Swab from Nasopharynx Updated: 07/18/25 5663     ADENOVIRUS, PCR Not Detected     Coronavirus 229E Not Detected      Coronavirus HKU1 Not Detected     Coronavirus NL63 Not Detected     Coronavirus OC43 Not Detected     COVID19 Not Detected     Human Metapneumovirus Not Detected     Human Rhinovirus/Enterovirus Not Detected     Influenza A PCR Not Detected     Influenza B PCR Not Detected     Parainfluenza Virus 1 Not Detected     Parainfluenza Virus 2 Not Detected     Parainfluenza Virus 3 Not Detected     Parainfluenza Virus 4 Not Detected     RSV, PCR Not Detected     Bordetella pertussis pcr Not Detected     Bordetella parapertussis PCR Not Detected     Chlamydophila pneumoniae PCR Not Detected     Mycoplasma pneumo by PCR Not Detected    Narrative:      In the setting of a positive respiratory panel with a viral infection PLUS a negative procalcitonin without other underlying concern for bacterial infection, consider observing off antibiotics or discontinuation of antibiotics and continue supportive care. If the respiratory panel is positive for atypical bacterial infection (Bordetella pertussis, Chlamydophila pneumoniae, or Mycoplasma pneumoniae), consider antibiotic de-escalation to target atypical bacterial infection.    COVID PRE-OP / PRE-PROCEDURE SCREENING ORDER (NO ISOLATION) - Swab, Nasopharynx [[id number]]  (Normal) Collected: 07/17/25 2135    Lab Status: Final result Specimen: Swab from Nasopharynx Updated: 07/17/25 2212    Narrative:      The following orders were created for panel order COVID PRE-OP / PRE-PROCEDURE SCREENING ORDER (NO ISOLATION) - Swab, Nasopharynx.  Procedure                               Abnormality         Status                     ---------                               -----------         ------                     COVID-19 and FLU A/B PCR...[[id number]]  Normal              Final result                 Please view results for these tests on the individual orders.    COVID-19 and FLU A/B PCR, 1 HR TAT - Swab, Nasopharynx [[id number]]  (Normal) Collected: 07/17/25 2135    Lab Status: Final result  Specimen: Swab from Nasopharynx Updated: 07/17/25 2212     COVID19 Not Detected     Influenza A PCR Not Detected     Influenza B PCR Not Detected          SLP FEES - Fiberoptic Endo Eval Swallow  Result Date: 7/20/2025  This procedure was auto-finalized with no dictation required.    XR Chest 1 View  Result Date: 7/20/2025  XR CHEST 1 VW Date of Exam: 7/20/2025 11:33 AM EDT Indication: hypoxia Comparison: 7/19/2025 CT Findings: Unchanged cardiomediastinal silhouette. Persistent bibasilar airspace opacities. Emphysema. No new focal airspace consolidation. No pleural effusion or pneumothorax. No acute bone abnormality.     Impression: Emphysema with persistent bibasilar airspace opacities. No new focal consolidation. Electronically Signed: Karthikeyan Mckeon MD  7/20/2025 12:15 PM EDT  Workstation ID: [id number]    CT Chest Without Contrast Diagnostic  Result Date: 7/19/2025  CT CHEST WO CONTRAST DIAGNOSTIC Date of Exam: 7/19/2025 4:01 PM EDT Indication: shortness of breath. Comparison: Chest CT 3/15/2024. Technique: Axial CT images were obtained of the chest without contrast administration.  Reconstructed coronal and sagittal images were also obtained. Automated exposure control and iterative construction methods were used. Findings: Bilateral lower lobe bronchial wall thickening with mucoid impaction and downstream nodular consolidations, compatible with an infectious/inflammatory process. Advanced emphysematous changes. No suspicious pulmonary nodule. Trace right pleural effusion. No pneumothorax. Upper limits of normal caliber atherosclerotic thoracic aorta, unchanged. Coronary artery calcifications. No pericardial effusion. No pathologically enlarged lymph nodes. No acute chest wall abnormality. Visualized portions of the upper abdomen demonstrate no acute findings. Multilevel thoracic spondylosis. No acute or suspicious osseous lesion.     Impression: Bilateral lower lobe infectious/inflammatory process with  endobronchial component, possibly related to aspiration. Trace right pleural effusion. Advanced emphysematous changes. Electronically Signed: Ayo Catherine MD  7/19/2025 5:51 PM EDT  Workstation ID: [id number]    CT Abdomen Pelvis Without Contrast  Result Date: 7/17/2025  CT ABDOMEN PELVIS WO CONTRAST Date of Exam: 7/17/2025 8:53 PM EDT Indication: Acute abdominal pain. Comparison: None available Technique: Axial CT images were obtained of the abdomen and pelvis without the administration of contrast. Reconstructed coronal and sagittal images were also obtained. Automated exposure control and iterative construction methods were used. Findings: The heart size is normal. There is no pericardial effusion. There is a trace right-sided pleural effusion. There is peripheral bronchiectasis and bronchial wall thickening within the left lower lobe and right middle lobe. The liver is normal in size. There is no focal liver lesion by noncontrast technique. The gallbladder is surgically absent. There is no intrahepatic or extrahepatic biliary ductal dilatation. The spleen is normal in size. The adrenal glands and pancreas appear within normal limits for noncontrast technique. The kidneys are symmetric in size. There is no hydronephrosis or urolithiasis. The urinary bladder is fluid-filled without wall thickening. The stomach and duodenum are normal in caliber and configuration. There are no abnormally dilated loops of small bowel to suggest small bowel obstruction. There is a small bowel containing left inguinal hernia. There is pancolonic diverticulosis without clear diverticulitis. There is diffuse edema and small volume ascites throughout the abdomen and pelvis. There is diffuse body wall edema and anasarca. The aorta is normal in caliber without evidence of aneurysm formation. There is aorto biiliac atherosclerotic calcification. There is degenerative disc disease at L4-L5 and L5-S1. There is inferior endplate compression  deformity of the L2 vertebral body.     Impression: 1.Diffuse body wall edema and anasarca with small volume ascites and trace right-sided pleural effusion. 2.Small bowel containing left inguinal hernia without evidence of obstruction. 3.Pancolonic diverticulosis without diverticulitis. 4.Chronic appearing inferior endplate compression deformity of the L2 vertebral body. Electronically Signed: Juwan Marion MD  7/17/2025 9:05 PM EDT  Workstation ID: [id number]    XR Chest 1 View  Result Date: 7/17/2025  XR CHEST 1 VW Date of Exam: 7/17/2025 7:47 PM EDT Indication: Shortness of breath Comparison: 6/20/2023. Findings: The heart size is normal. There is diffuse interstitial prominence which probably is due to pulmonary interstitial edema although this can be seen with an atypical infection. There is no pleural effusion or pneumothorax. There are chronic age-related changes involving the bony thorax and thoracic aorta.     Impression: Diffuse interstitial prominence which probably is due to pulmonary interstitial edema although this can be seen with an atypical infection. Electronically Signed: Joe Rice MD  7/17/2025 8:24 PM EDT  Workstation ID: [id number]    Results for orders placed during the hospital encounter of 07/17/25    Adult Transthoracic Echo Complete With Contrast if Necessary Per Protocol 07/18/2025 11:02 AM    Interpretation Summary    Left ventricular systolic function is normal. Calculated left ventricular EF = 65%    Left ventricular diastolic function was normal.    The right ventricular cavity is moderately dilated.    The right atrial cavity is mildly dilated.    Discharge Details        Discharge Medications        New Medications        Instructions Start Date   cefuroxime 500 MG tablet  Commonly known as: CEFTIN   500 mg, Oral, 2 Times Daily      doxycycline 100 MG capsule  Commonly known as: VIBRAMYCIN   100 mg, Oral, 2 Times Daily      Eliquis 5 MG tablet tablet  Generic drug: apixaban   5 mg,  Oral, Every 12 Hours Scheduled      ipratropium-albuterol 0.5-2.5 mg/3 ml nebulizer  Commonly known as: DUO-NEB   3 mL, Nebulization, Every 4 Hours PRN      melatonin 5 MG tablet tablet   5 mg, Oral, Nightly PRN      nicotine 21 MG/24HR patch  Commonly known as: NICODERM CQ   1 patch, Transdermal, Every 24 Hours Scheduled   Start Date: July 23, 2025     polyethylene glycol 17 GM/SCOOP powder  Commonly known as: MIRALAX   Mix and drink 17 g by mouth Daily.      predniSONE 10 MG tablet  Commonly known as: DELTASONE   Take 4 tablets by mouth Daily for 3 days, THEN 3 tablets Daily for 3 days, THEN 2 tablets Daily for 3 days, THEN 1 tablet Daily for 3 days.   Start Date: July 22, 2025     sennosides-docusate 8.6-50 MG per tablet  Commonly known as: PERICOLACE   2 tablets, Oral, Daily, May take an additional 2 tablets daily as needed for constipation.      thiamine 100 MG tablet  Commonly known as: VITAMIN B1   100 mg, Oral, Daily   Start Date: July 23, 2025     Trelegy Ellipta 100-62.5-25 MCG/ACT inhaler  Generic drug: Fluticasone-Umeclidin-Vilant   1 puff, Inhalation, Daily - RT             No Known Allergies    Discharge Disposition:Home or Self Care    Diet:  Diet Instructions       Diet: Regular/House Diet, Cardiac Diets, High Protein Diet; Healthy Heart (2-3 Na+); Soft to Chew (NDD 3); Chopped Meat; Thin (IDDSI 0)      Discharge Diet:  Regular/House Diet  Cardiac Diets  High Protein Diet       Cardiac Diet: Healthy Heart (2-3 Na+)    Texture: Soft to Chew (NDD 3)    Soft to Chew: Chopped Meat    Fluid Consistency: Thin (IDDSI 0)           Activity:  Activity Instructions       Activity as Tolerated            CODE STATUS:    Code Status and Medical Interventions: CPR (Attempt to Resuscitate); Full Support   Ordered at: 07/17/25 9658     Code Status (Patient has no pulse and is not breathing):    CPR (Attempt to Resuscitate)     Medical Interventions (Patient has pulse or is breathing):    Full Support     Level Of  Support Discussed With:    Patient     No future appointments.    Additional Instructions for the Follow-ups that You Need to Schedule       Ambulatory Referral to Home Health   As directed      Face to Face Visit Date: 7/21/2025   Follow-up provider for Plan of Care?: I treated the patient in an acute care facility and will not continue treatment after discharge.   Follow-up provider: MELANI DUGGAN [[id number]]   Reason/Clinical Findings: status post hospital stay   Describe mobility limitations that make leaving home difficult: impaired physical mobility, balance, gait and endurance   Nursing/Therapeutic Services Requested: Skilled Nursing Physical Therapy Occupational Therapy   Skilled nursing orders: COPD management O2 instruction   PT orders: Strengthening Home safety assessment   Occupational orders: Strengthening Home safety assessment   Frequency: 1 Week 1              Monica Bobby PA-C  07/22/25    Time Spent on Discharge:  I spent 40 minutes on this discharge activity which included: face-to-face encounter with the patient, reviewing the data in the system, coordination of the care with the nursing staff as well as consultants, documentation, and entering orders.            Electronically signed by Pat Billings MD at 07/23/25 0701       Discharge Order (From admission, onward)       Start     Ordered    07/22/25 1018  Discharge patient  Once        Expected Discharge Date: 07/22/25   Expected Discharge Time: Afternoon   Discharge Disposition: Home or Self Care   Physician of Record for Attribution - Please select from Treatment Team: PAT BILLINGS [[id number]]   Review needed by CMO to determine Physician of Record: No      Question Answer Comment   Physician of Record for Attribution - Please select from Treatment Team PAT BILLINGS    Review needed by CMO to determine Physician of Record No        07/22/25 1029

## 2025-07-28 ENCOUNTER — OFFICE VISIT (OUTPATIENT)
Dept: FAMILY MEDICINE CLINIC | Facility: CLINIC | Age: 75
End: 2025-07-28
Payer: MEDICARE

## 2025-07-28 VITALS
HEIGHT: 67 IN | SYSTOLIC BLOOD PRESSURE: 102 MMHG | TEMPERATURE: 97.3 F | BODY MASS INDEX: 15.7 KG/M2 | DIASTOLIC BLOOD PRESSURE: 68 MMHG | WEIGHT: 100 LBS | HEART RATE: 81 BPM | OXYGEN SATURATION: 86 %

## 2025-07-28 DIAGNOSIS — K40.90 UNILATERAL INGUINAL HERNIA WITHOUT OBSTRUCTION OR GANGRENE, RECURRENCE NOT SPECIFIED: ICD-10-CM

## 2025-07-28 DIAGNOSIS — Z11.59 NEED FOR HEPATITIS C SCREENING TEST: ICD-10-CM

## 2025-07-28 DIAGNOSIS — E87.1 HYPONATREMIA: ICD-10-CM

## 2025-07-28 DIAGNOSIS — Z13.6 ENCOUNTER FOR LIPID SCREENING FOR CARDIOVASCULAR DISEASE: ICD-10-CM

## 2025-07-28 DIAGNOSIS — Z09 HOSPITAL DISCHARGE FOLLOW-UP: Primary | ICD-10-CM

## 2025-07-28 DIAGNOSIS — J96.11 CHRONIC RESPIRATORY FAILURE WITH HYPOXIA: ICD-10-CM

## 2025-07-28 DIAGNOSIS — K64.4 EXTERNAL HEMORRHOID: ICD-10-CM

## 2025-07-28 DIAGNOSIS — J44.1 COPD WITH EXACERBATION: ICD-10-CM

## 2025-07-28 DIAGNOSIS — Z13.220 ENCOUNTER FOR LIPID SCREENING FOR CARDIOVASCULAR DISEASE: ICD-10-CM

## 2025-07-28 DIAGNOSIS — E43 SEVERE MALNUTRITION: ICD-10-CM

## 2025-07-28 DIAGNOSIS — E87.5 HYPERKALEMIA: ICD-10-CM

## 2025-07-28 PROCEDURE — 1111F DSCHRG MED/CURRENT MED MERGE: CPT | Performed by: PHYSICIAN ASSISTANT

## 2025-07-28 PROCEDURE — 99495 TRANSJ CARE MGMT MOD F2F 14D: CPT | Performed by: PHYSICIAN ASSISTANT

## 2025-07-28 RX ORDER — CEFUROXIME AXETIL 500 MG/1
1000 TABLET ORAL 3 TIMES DAILY
COMMUNITY

## 2025-07-28 RX ORDER — HYDROCORTISONE 25 MG/G
CREAM TOPICAL 2 TIMES DAILY PRN
Qty: 28 G | Refills: 0 | Status: SHIPPED | OUTPATIENT
Start: 2025-07-28

## 2025-07-28 NOTE — PROGRESS NOTES
Subjective   Elisabeth Burger is a 74 y.o. female.     History of Present Illness   History of Present Illness  The patient presents for a hospital follow-up after being admitted to [facility] from 07/17/2025 to 07/22/2025. She was diagnosed with COPD with acute exacerbation, chronic respiratory failure with hypoxia, and a known pulmonary nodule that had been monitored through the pulmonology clinic. Her last visit to the pulmonology clinic was over a year ago. She also has severe malnutrition. There were initial concerns about congestive heart failure, but symptoms were believed to be secondary to pulmonary congestion. She is oxygen dependent and is new to this provider, previously seen by IVETH Rojas. Her last visit to our clinic was in 11/2023. During her hospital stay, labs showed variations in potassium levels, which stabilized and returned to normal by discharge. A metabolic panel revealed hyponatremia, which further depleted to 131 by discharge. Blood sugar levels varied significantly, being elevated at times and hypoglycemic at others. Hemoglobin A1c was within the normal range at 5.5. She has no history of diabetes mellitus. She was prescribed a prednisone taper from the hospital. She also has a diagnosis of atrial fibrillation and was prescribed Eliquis.    She has been under the care of Dr. Zahira Werner, who prescribed her medications including potassium chloride, Jardiance, furosemide, Eliquis, lisinopril, Trelegy, and albuterol. She also received melatonin, magnesium, and Rocephin. Her potassium levels were overshot, leading to the administration of solutions to bring them back down. She experienced an episode of vertigo and had fluid in her lungs. Since her discharge, she has been eating well and sleeping until noon. She is currently on 4 liters of oxygen and living with her niece. She has a follow-up appointment with pulmonology on 08/04/2025.    She has a pre-existing diagnosis of atrial  fibrillation but not mentioned in recent hospital report.  cardiology, confirmed that her heart is fine, but there was fluid on the left side of her heart connected to her lungs. The decision to continue Eliquis was left to her discretion.    She has been experiencing skin rashes and itching, which she attributes to a patch applied above her buttocks to prevent bedsores. She has been using lotion for relief.    She has a bulge in left inguinal area, which has been present for some time (per patient who is a poor historian due to memory issues). She had a pessary inserted in the past, which was later removed.    She has been using a stool softener as needed and reports improvement in her bowel movements.    She has been diagnosed with hemorrhoids, which have been causing discomfort. She has been using Preparation H for relief.    Diet: She has been eating well since her discharge, including balanced meals and cookouts with family.  Tobacco: She smokes about half a pack a day and takes off her oxygen while doing so.  Sleep: She has been sleeping until noon.  Living Condition: She is currently living with her niece.    PAST SURGICAL HISTORY: She had a pessary inserted in the past, which was later removed.     She is currently admitted to hospice. Per niece who is caring for patient they have only given her a few months prognosis.   Prior to living with niece, she was living with her daughter and she was not eating well- consuming bologna sandwiches or plain bread with hot sauce, not going to doctor's appointments and likely not adhering to medications. [facility] was called by  who have been working with patient and niece     The following portions of the patient's history were reviewed and updated as appropriate: allergies, current medications, past family history, past medical history, past social history, past surgical history, and problem list.    Review of Systems  As noted per HPI     Objective   Blood  "pressure 102/68, pulse 81, temperature 97.3 °F (36.3 °C), height 170.2 cm (67\"), weight 45.4 kg (100 lb), SpO2 (!) 86%. Body mass index is 15.66 kg/m².     Physical Exam  Vitals reviewed.   Constitutional:       Comments: Very thin/frail   HENT:      Head: Normocephalic.   Cardiovascular:      Rate and Rhythm: Normal rate and regular rhythm.   Pulmonary:      Effort: Pulmonary effort is normal.      Comments: On 4 L oxygen via nasal cannula   Abdominal:      Hernia: A hernia is present. Hernia is present in the left inguinal area.   Skin:     General: Skin is warm and dry.   Neurological:      Mental Status: She is alert.   Psychiatric:         Mood and Affect: Mood normal.         Behavior: Behavior normal.         Cognition and Memory: Memory is impaired.         Results  Labs   - Potassium levels: Stabilized and returned to normal at time of discharge   - Metabolic panel: Hyponatremia that had further depleted at time of discharge to 131   - Hemoglobin A1c: Normal range at 5.5    Assessment & Plan   Assessment & Plan  1. Chronic Obstructive Pulmonary Disease (COPD) with acute exacerbation.  - Recently hospitalized for acute exacerbation of COPD; currently on 4 L of oxygen.  - Physical exam findings include oxygen dependency and use of Trelegy and albuterol.  - Follow-up appointment with pulmonology scheduled for 08/04/2025.  - Continue current medications including Trelegy and albuterol.    2. Chronic respiratory failure with hypoxia.  - Oxygen-dependent; requires continuous monitoring of oxygen levels.  - Oxygen supply provided by Hospice.  - Discussed the importance of monitoring oxygen levels and maintaining adequate oxygen supply.  - Continue using oxygen supply provided by Hospice.    3. Atrial Fibrillation.  - Currently on Eliquis for atrial fibrillation.  - Risks and benefits of continuing Eliquis discussed; potential for increased bleeding risk noted.  - Decided to hold Eliquis for now due to lack of " recent cardiac events.  - Monitor for any signs of clotting risk; consider baby aspirin as an alternative.    4. Severe malnutrition.  - Prescribed thiamine and other supplements to address malnutrition.  - Dietary intake has improved since hospitalization.  - Blood work ordered today to check sodium, potassium, iron, and B12 levels.  - Continue current dietary improvements and supplements; adjust based on blood work results.    5. Hyponatremia.  - Sodium levels were low during hospitalization; improving.  - Monitoring of sodium intake and levels necessary.  - Blood work ordered today to recheck sodium levels.  - Continue monitoring and adjust dietary intake as needed.    6. Hypokalemia.  - Experienced low potassium levels during hospitalization.  - Taking potassium supplements as prescribed.  - Blood work ordered today to recheck potassium levels.  - Continue potassium supplements and monitor levels.    7. Hemorrhoids.  - Using over-the-counter Preparation H for hemorrhoids.  - Prescription for Anusol rectal steroid provided; use up to two times a day as needed.  - Monitor for any changes in symptoms or increased discomfort.  - Continue using Preparation H and Anusol as needed.    8. Skin rash.  - Rash likely due to irritation from a patch.  - Advised to use Cetaphil or Eucerin lotion for hydration; avoid scented products.  - Monitor for any changes in rash or skin condition.  - Continue using recommended lotions for hydration.    9. Hernia.  - Hernia noted during examination; not currently causing significant issues.  - Monitor for any changes such as increased pain or discoloration.  - Immediate medical attention required if strangulated or causes bowel obstruction.  - Monitor hernia and seek medical attention if symptoms worsen.    Follow-up: 08/04/2025 for pulmonology appointment.     Diagnoses and all orders for this visit:    1. Hospital discharge follow-up (Primary)  -     CBC w AUTO Differential  -      Comprehensive metabolic panel    2. Chronic respiratory failure with hypoxia  -     CBC w AUTO Differential  -     Comprehensive metabolic panel    3. COPD with exacerbation  -     CBC w AUTO Differential  -     Comprehensive metabolic panel    4. Severe malnutrition  -     CBC w AUTO Differential  -     Comprehensive metabolic panel  -     Iron Profile w/o Ferritin  -     Vitamin B12  -     Folate  -     Magnesium  -     Vitamin D 25 hydroxy  -     Vitamin B1, Whole Blood  -     Vitamin C  -     Vitamin A & E    5. Hyperkalemia  -     Comprehensive metabolic panel    6. Hyponatremia  -     Comprehensive metabolic panel    7. Need for hepatitis C screening test  -     Hepatitis C antibody    8. Encounter for lipid screening for cardiovascular disease  -     Lipid Panel    9. External hemorrhoid  -     Hydrocortisone, Perianal, (ANUSOL-HC) 2.5 % rectal cream; Insert  into the rectum 2 (Two) Times a Day As Needed for Hemorrhoids.  Dispense: 28 g; Refill: 0    10. Unilateral inguinal hernia without obstruction or gangrene, recurrence not specified               Patient or patient representative verbalized consent for the use of Ambient Listening during the visit with  RALPH Ladd for chart documentation. 7/28/2025  12:48 EDT

## 2025-08-01 LAB
25(OH)D3+25(OH)D2 SERPL-MCNC: 24.2 NG/ML (ref 30–100)
A-TOCOPHEROL VIT E SERPL-MCNC: 16 MG/L (ref 9–29)
ALBUMIN SERPL-MCNC: 3.9 G/DL (ref 3.8–4.8)
ALP SERPL-CCNC: 89 IU/L (ref 44–121)
ALT SERPL-CCNC: 30 IU/L (ref 0–32)
AST SERPL-CCNC: 23 IU/L (ref 0–40)
BASOPHILS # BLD AUTO: 0 X10E3/UL (ref 0–0.2)
BASOPHILS NFR BLD AUTO: 0 %
BILIRUB SERPL-MCNC: 0.2 MG/DL (ref 0–1.2)
BUN SERPL-MCNC: 11 MG/DL (ref 8–27)
BUN/CREAT SERPL: 24 (ref 12–28)
CALCIUM SERPL-MCNC: 9.5 MG/DL (ref 8.7–10.3)
CHLORIDE SERPL-SCNC: 92 MMOL/L (ref 96–106)
CHOLEST SERPL-MCNC: 209 MG/DL (ref 100–199)
CO2 SERPL-SCNC: 32 MMOL/L (ref 20–29)
CREAT SERPL-MCNC: 0.46 MG/DL (ref 0.57–1)
EGFRCR SERPLBLD CKD-EPI 2021: 99 ML/MIN/1.73
EOSINOPHIL # BLD AUTO: 0.1 X10E3/UL (ref 0–0.4)
EOSINOPHIL NFR BLD AUTO: 1 %
ERYTHROCYTE [DISTWIDTH] IN BLOOD BY AUTOMATED COUNT: 13.5 % (ref 11.7–15.4)
FOLATE SERPL-MCNC: 15.6 NG/ML
GAMMA TOCOPHEROL SERPL-MCNC: 1.4 MG/L (ref 0.5–4.9)
GLOBULIN SER CALC-MCNC: 2.8 G/DL (ref 1.5–4.5)
GLUCOSE SERPL-MCNC: 85 MG/DL (ref 70–99)
HCT VFR BLD AUTO: 44.7 % (ref 34–46.6)
HCV IGG SERPL QL IA: NON REACTIVE
HDLC SERPL-MCNC: 99 MG/DL
HGB BLD-MCNC: 13.3 G/DL (ref 11.1–15.9)
IMM GRANULOCYTES # BLD AUTO: 0 X10E3/UL (ref 0–0.1)
IMM GRANULOCYTES NFR BLD AUTO: 0 %
IRON SATN MFR SERPL: 19 % (ref 15–55)
IRON SERPL-MCNC: 62 UG/DL (ref 27–139)
LDLC SERPL CALC-MCNC: 87 MG/DL (ref 0–99)
LYMPHOCYTES # BLD AUTO: 0.9 X10E3/UL (ref 0.7–3.1)
LYMPHOCYTES NFR BLD AUTO: 9 %
MAGNESIUM SERPL-MCNC: 2.2 MG/DL (ref 1.6–2.3)
MCH RBC QN AUTO: 28.1 PG (ref 26.6–33)
MCHC RBC AUTO-ENTMCNC: 29.8 G/DL (ref 31.5–35.7)
MCV RBC AUTO: 95 FL (ref 79–97)
MONOCYTES # BLD AUTO: 0.2 X10E3/UL (ref 0.1–0.9)
MONOCYTES NFR BLD AUTO: 2 %
NEUTROPHILS # BLD AUTO: 9.6 X10E3/UL (ref 1.4–7)
NEUTROPHILS NFR BLD AUTO: 88 %
PLATELET # BLD AUTO: 422 X10E3/UL (ref 150–450)
POTASSIUM SERPL-SCNC: 4.3 MMOL/L (ref 3.5–5.2)
PROT SERPL-MCNC: 6.7 G/DL (ref 6–8.5)
RBC # BLD AUTO: 4.73 X10E6/UL (ref 3.77–5.28)
SODIUM SERPL-SCNC: 139 MMOL/L (ref 134–144)
TIBC SERPL-MCNC: 326 UG/DL (ref 250–450)
TRIGL SERPL-MCNC: 141 MG/DL (ref 0–149)
UIBC SERPL-MCNC: 264 UG/DL (ref 118–369)
VIT A SERPL-MCNC: 44.8 UG/DL (ref 22–69.5)
VIT B1 BLD-SCNC: 270 NMOL/L (ref 66.5–200)
VIT B12 SERPL-MCNC: 460 PG/ML (ref 232–1245)
VIT C SERPL-MCNC: 0.4 MG/DL (ref 0.4–2)
VLDLC SERPL CALC-MCNC: 23 MG/DL (ref 5–40)
WBC # BLD AUTO: 10.8 X10E3/UL (ref 3.4–10.8)

## 2025-08-04 ENCOUNTER — OFFICE VISIT (OUTPATIENT)
Dept: PULMONOLOGY | Facility: CLINIC | Age: 75
End: 2025-08-04
Payer: MEDICARE

## 2025-08-04 VITALS
DIASTOLIC BLOOD PRESSURE: 62 MMHG | HEART RATE: 92 BPM | WEIGHT: 97 LBS | RESPIRATION RATE: 18 BRPM | SYSTOLIC BLOOD PRESSURE: 104 MMHG | BODY MASS INDEX: 15.22 KG/M2 | HEIGHT: 67 IN | OXYGEN SATURATION: 91 %

## 2025-08-04 DIAGNOSIS — J96.11 CHRONIC RESPIRATORY FAILURE WITH HYPOXIA: ICD-10-CM

## 2025-08-04 DIAGNOSIS — R91.1 PULMONARY NODULE: ICD-10-CM

## 2025-08-04 DIAGNOSIS — Z72.0 TOBACCO USE: Primary | ICD-10-CM

## 2025-08-04 DIAGNOSIS — J44.1 COPD WITH ACUTE EXACERBATION: ICD-10-CM

## 2025-08-04 PROCEDURE — 99214 OFFICE O/P EST MOD 30 MIN: CPT | Performed by: NURSE PRACTITIONER
